# Patient Record
Sex: FEMALE | Race: OTHER | HISPANIC OR LATINO | Employment: FULL TIME | URBAN - METROPOLITAN AREA
[De-identification: names, ages, dates, MRNs, and addresses within clinical notes are randomized per-mention and may not be internally consistent; named-entity substitution may affect disease eponyms.]

---

## 2018-04-20 ENCOUNTER — CONVERSION ENCOUNTER (OUTPATIENT)
Dept: MAMMOGRAPHY | Facility: CLINIC | Age: 48
End: 2018-04-20

## 2019-01-25 ENCOUNTER — OFFICE VISIT (OUTPATIENT)
Dept: FAMILY MEDICINE CLINIC | Facility: CLINIC | Age: 49
End: 2019-01-25
Payer: COMMERCIAL

## 2019-01-25 VITALS
DIASTOLIC BLOOD PRESSURE: 82 MMHG | HEIGHT: 66 IN | BODY MASS INDEX: 32.47 KG/M2 | OXYGEN SATURATION: 97 % | TEMPERATURE: 98 F | SYSTOLIC BLOOD PRESSURE: 128 MMHG | WEIGHT: 202 LBS | HEART RATE: 76 BPM | RESPIRATION RATE: 16 BRPM

## 2019-01-25 DIAGNOSIS — E87.5 HYPERKALEMIA: ICD-10-CM

## 2019-01-25 DIAGNOSIS — R76.8 ANA POSITIVE: ICD-10-CM

## 2019-01-25 DIAGNOSIS — E83.39 ALKALINE PHOSPHATASE DEFICIENCY: Primary | ICD-10-CM

## 2019-01-25 DIAGNOSIS — E55.9 VITAMIN D DEFICIENCY: ICD-10-CM

## 2019-01-25 DIAGNOSIS — E78.2 MIXED HYPERLIPIDEMIA: ICD-10-CM

## 2019-01-25 PROCEDURE — 99214 OFFICE O/P EST MOD 30 MIN: CPT | Performed by: FAMILY MEDICINE

## 2019-01-25 RX ORDER — IBUPROFEN 200 MG
400 TABLET ORAL EVERY 6 HOURS PRN
COMMUNITY

## 2019-01-25 RX ORDER — FLUTICASONE PROPIONATE 50 MCG
1 SPRAY, SUSPENSION (ML) NASAL AS NEEDED
COMMUNITY
End: 2021-05-12 | Stop reason: ALTCHOICE

## 2019-01-25 RX ORDER — SIMETHICONE 20 MG/.3ML
40 EMULSION ORAL 4 TIMES DAILY PRN
COMMUNITY

## 2019-01-25 RX ORDER — LORATADINE 10 MG/1
10 TABLET ORAL DAILY
COMMUNITY

## 2019-01-25 RX ORDER — CHOLECALCIFEROL (VITAMIN D3) 125 MCG
2000 CAPSULE ORAL DAILY
COMMUNITY
End: 2021-05-12 | Stop reason: ALTCHOICE

## 2019-01-25 RX ORDER — TETRAHYDROZOLINE HCL 0.05 %
1 DROPS OPHTHALMIC (EYE) 4 TIMES DAILY PRN
COMMUNITY

## 2019-01-25 RX ORDER — LANOLIN ALCOHOL/MO/W.PET/CERES
3 CREAM (GRAM) TOPICAL
COMMUNITY
End: 2021-05-12 | Stop reason: ALTCHOICE

## 2019-01-25 RX ORDER — FOLIC ACID/MULTIVIT,IRON,MINER 0.4MG-18MG
1250 TABLET ORAL DAILY
COMMUNITY
End: 2021-07-09

## 2019-01-27 NOTE — ASSESSMENT & PLAN NOTE
Chronic ,fair control on current with diet    Advised to maintain a low-fat low-cholesterol diet consult regarding potential comorbidity including cardiovascular disease consult regarding important weight loss

## 2019-01-27 NOTE — PROGRESS NOTES
Subjective:   Chief Complaint   Patient presents with    Follow-up     chronic conditions        Patient ID: Taisha Alonzo is a 50 y o  female  Patient recently she had blood work order by other PCP ,patient brought copy had abnormal Alkaline phosphatase ,abnormal potassium,positive TANGELA   Patient denies any abdomen pain,no nausea no vomiting no diarrhea ,no weight change ,no jaundice ,no muscle pain ,no joint pain or swelling  Patient with history of hyperlipidemia ,controlled by diet ,denies any chest pain no short of breath no palpitation ,no headache        The following portions of the patient's history were reviewed and updated as appropriate: allergies, current medications, past family history, past medical history, past social history, past surgical history and problem list     Review of Systems   Constitutional: Negative for fatigue and fever  HENT: Negative for ear pain, sinus pain, sinus pressure and sore throat  Eyes: Negative for pain and redness  Respiratory: Negative for cough, chest tightness and shortness of breath  Cardiovascular: Negative for chest pain, palpitations and leg swelling  Gastrointestinal: Negative for abdominal pain, blood in stool, constipation, diarrhea and nausea  Genitourinary: Negative for flank pain, frequency and hematuria  Musculoskeletal: Negative for back pain and joint swelling  Skin: Negative for rash  Neurological: Negative for dizziness, numbness and headaches  Hematological: Does not bruise/bleed easily  Objective:  Vitals:    01/25/19 1539   BP: 128/82   BP Location: Left arm   Patient Position: Sitting   Cuff Size: Large   Pulse: 76   Resp: 16   Temp: 98 °F (36 7 °C)   TempSrc: Oral   SpO2: 97%   Weight: 91 6 kg (202 lb)   Height: 5' 6" (1 676 m)      Physical Exam   Constitutional: She is oriented to person, place, and time  She appears well-developed and well-nourished  HENT:   Head: Normocephalic     Right Ear: External ear normal    Left Ear: External ear normal    Eyes: Conjunctivae and EOM are normal  Right eye exhibits no discharge  Left eye exhibits no discharge  Neck: No JVD present  Cardiovascular: Normal rate, regular rhythm and normal heart sounds  Exam reveals no gallop  No murmur heard  Pulmonary/Chest: Effort normal  No respiratory distress  She has no wheezes  She has no rales  She exhibits no tenderness  Abdominal: She exhibits no mass  There is no tenderness  There is no rebound  Musculoskeletal: She exhibits no edema or tenderness  Neurological: She is alert and oriented to person, place, and time  Skin: No rash noted  No erythema  Assessment/Plan:    Vitamin D deficiency  Chronic Asymptomatic ,continue Vit D 2000 iu/day   Vit D rich diet discuss with patient ,recomened increase exercise    Mixed hyperlipidemia  Chronic ,fair control on current with diet  Advised to maintain a low-fat low-cholesterol diet consult regarding potential comorbidity including cardiovascular disease consult regarding important weight loss      Hyperkalemia  Incident finding on recent blood work     TANGELA positive  Incident finding on recent blood work Asymptomatic ,will recheck it if persistent to be abnormal will refer to Rheumatology    Alkaline phosphatase deficiency  Abnormal finding on blood work Asymptomatic   Will order US of liver        Diagnoses and all orders for this visit:    Alkaline phosphatase deficiency  -     Comprehensive metabolic panel; Future  -     US liver; Future    Hyperkalemia  -     Comprehensive metabolic panel; Future    Mixed hyperlipidemia    TANGELA positive  -     TANGELA Screen w/ Reflex to Titer/Pattern; Future    Vitamin D deficiency  -     Vitamin D 25 hydroxy; Future    Other orders  -     Multiple Vitamins-Calcium (ONE-A-DAY WOMENS PO);  Take 1 tablet by mouth daily  -     Cholecalciferol (VITAMIN D3) 2000 units TABS; Take 2,000 Units by mouth daily  -     Probiotic Product (PROBIOTIC PO); Take 1 capsule by mouth daily  -     Coenzyme Q10 (COQ-10) 200 MG CAPS; Take 1 capsule by mouth daily  -     Glucosamine-Chondroit-Vit C-Mn (GLUCOSAMINE 1500 COMPLEX PO); Take 1 capsule by mouth daily  -     loratadine (CLARITIN) 10 mg tablet; Take 10 mg by mouth daily  -     fluticasone (FLONASE) 50 mcg/act nasal spray; 1 spray into each nostril as needed for rhinitis  -     tetrahydrozoline (VISINE) 0 05 % ophthalmic solution; Administer 1 drop to both eyes 4 (four) times a day as needed for irritation  -     Propylene Glycol (SYSTANE BALANCE OP); Apply 1 drop to eye as needed  -     Phenylephrine-DM-GG-APAP (MUCINEX FAST-MAX) 10--650 MG PACK; Take 1 capsule by mouth as needed  -     simethicone (MYLICON) 40 SH/9 1 mL drops; Take 40 mg by mouth 4 (four) times a day as needed for flatulence  -     bismuth subsalicylate (PEPTO BISMOL) 524 mg/30 mL oral suspension; Take 15 mL by mouth every 6 (six) hours as needed for indigestion  -     melatonin 3 mg; Take 3 mg by mouth daily at bedtime  -     ibuprofen (MOTRIN) 200 mg tablet; Take 400 mg by mouth every 6 (six) hours as needed for mild pain  -     Omega-3 Fatty Acids (OMEGA-3 FISH OIL) 1200 MG CAPS;  Take 1,250 mg by mouth daily

## 2019-01-27 NOTE — ASSESSMENT & PLAN NOTE
Chronic Asymptomatic ,continue Vit D 2000 iu/day   Vit D rich diet discuss with patient ,recomened increase exercise

## 2019-01-27 NOTE — ASSESSMENT & PLAN NOTE
Incident finding on recent blood work Asymptomatic ,will recheck it if persistent to be abnormal will refer to Rheumatology

## 2019-02-08 ENCOUNTER — TELEPHONE (OUTPATIENT)
Dept: FAMILY MEDICINE CLINIC | Facility: CLINIC | Age: 49
End: 2019-02-08

## 2019-02-08 DIAGNOSIS — R74.8 ELEVATED ALKALINE PHOSPHATASE LEVEL: Primary | ICD-10-CM

## 2019-02-08 NOTE — TELEPHONE ENCOUNTER
----- Message from Sara Santos MD sent at 2/8/2019 10:39 AM EST -----  Note sent to [facility] ,to refer patient to GI for elevated alkaline phosphatase

## 2019-02-14 ENCOUNTER — ANNUAL EXAM (OUTPATIENT)
Dept: GYNECOLOGY | Facility: CLINIC | Age: 49
End: 2019-02-14
Payer: COMMERCIAL

## 2019-02-14 VITALS
WEIGHT: 204.2 LBS | SYSTOLIC BLOOD PRESSURE: 132 MMHG | RESPIRATION RATE: 16 BRPM | BODY MASS INDEX: 32.82 KG/M2 | HEIGHT: 66 IN | TEMPERATURE: 97.7 F | DIASTOLIC BLOOD PRESSURE: 74 MMHG

## 2019-02-14 DIAGNOSIS — Z12.31 ENCOUNTER FOR SCREENING MAMMOGRAM FOR MALIGNANT NEOPLASM OF BREAST: ICD-10-CM

## 2019-02-14 DIAGNOSIS — Z87.42 HISTORY OF ABNORMAL CERVICAL PAP SMEAR: ICD-10-CM

## 2019-02-14 DIAGNOSIS — Z01.419 WOMEN'S ANNUAL ROUTINE GYNECOLOGICAL EXAMINATION: Primary | ICD-10-CM

## 2019-02-14 DIAGNOSIS — Z11.51 SCREENING FOR HPV (HUMAN PAPILLOMAVIRUS): ICD-10-CM

## 2019-02-14 DIAGNOSIS — N93.9 ABNORMAL UTERINE BLEEDING: ICD-10-CM

## 2019-02-14 PROCEDURE — 99396 PREV VISIT EST AGE 40-64: CPT | Performed by: OBSTETRICS & GYNECOLOGY

## 2019-02-14 PROCEDURE — 87624 HPV HI-RISK TYP POOLED RSLT: CPT | Performed by: OBSTETRICS & GYNECOLOGY

## 2019-02-14 PROCEDURE — G0145 SCR C/V CYTO,THINLAYER,RESCR: HCPCS | Performed by: OBSTETRICS & GYNECOLOGY

## 2019-02-14 RX ORDER — METHYLPREDNISOLONE 4 MG
TABLET, DOSE PACK ORAL
COMMUNITY
Start: 2018-05-04 | End: 2021-05-12 | Stop reason: ALTCHOICE

## 2019-02-14 RX ORDER — LORATADINE 10 MG/1
1 TABLET ORAL EVERY 24 HOURS
COMMUNITY
Start: 2018-04-12 | End: 2021-05-12 | Stop reason: ALTCHOICE

## 2019-02-14 RX ORDER — FLUTICASONE PROPIONATE 50 MCG
SPRAY, SUSPENSION (ML) NASAL
COMMUNITY
Start: 2018-04-12 | End: 2021-05-12 | Stop reason: ALTCHOICE

## 2019-02-14 NOTE — PROGRESS NOTES
Assessment/Plan:       Diagnoses and all orders for this visit:    Women's annual routine gynecological examination  -     Liquid-based pap, screening    Abnormal uterine bleeding  -     US pelvis complete w transvaginal; Future    Encounter for screening mammogram for malignant neoplasm of breast  -     Mammo screening bilateral w 3d & cad; Future    Screening for HPV (human papillomavirus)  -     Liquid-based pap, screening    History of abnormal cervical Pap smear    Other orders  -     Ascorbic Acid Buffered (BUFFERED VITAMIN C) 1000 MG CAPS  -     Glucosamine Sulfate 500 MG TABS; 4 tablets daily- = 2000mg  -     loratadine (CLARITIN) 10 mg tablet; 1 tablet every 24 hours  -     fluticasone (FLONASE) 50 mcg/act nasal spray; inhale 2 spray by intranasal route  every day in each nostril          Subjective:      Patient ID: Taisha Alonzo is a 50 y o  female  The patient is a 54-year-old who presents for an annual gyn exam   She has a history of abnormal Pap smears and a positive HPV test in the past   She had her cervix frozen and had 1 Pap smear afterwards  She was told that it was "improved"  She has had no Pap smears in the last 7 years  The importance of regular Pap smears was stressed  The patient reports that 3 years ago her periods started to be irregular  She had them about every 3 months, then she had a single period the next year  After 11 months of amenorrhea, she had a 1 day period on 10/23/18  She also reports that she has had daily brown spotting for the last 6 months  BW ordered by her PCP showed menopausal levels of FSH and LH in September 2018  She has had infrequent hot flashes since September  She has 1 about once a week  We discussed that this daily spotting is not normal  The patient will need an ultrasound of the pelvis  If her endometrium is thickened, she will need an endometrial biopsy  We discussed that no amount of bleeding is normal after menopause    We also discussed the potential causes of this bleeding including benign causes and uterine cancer  The patient agrees to the ultrasound and endometrial biopsy if necessary  She will return to the office 1 week after her ultrasound  The patient reports that since the birth of her child 11 years ago, she has had a mild problem with stress urinary incontinence  It is not bad enough that she needs to wear a pad  The patient is not sexually active  The patient is under a great deal of stress, because 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  She gets very little sleep  The following portions of the patient's history were reviewed and updated as appropriate: allergies, current medications, past family history, past medical history, past social history, past surgical history and problem list     Review of Systems   Constitutional: Negative  Respiratory: Negative for shortness of breath  Cardiovascular: Negative for chest pain  Gastrointestinal: Negative  Genitourinary: Negative  Skin: Negative  Psychiatric/Behavioral: Negative for agitation, behavioral problems and confusion  Objective:      /74 (BP Location: Right arm, Patient Position: Sitting, Cuff Size: Standard)   Temp 97 7 °F (36 5 °C) (Tympanic)   Resp 16   Ht 5' 6" (1 676 m)   Wt 92 6 kg (204 lb 3 2 oz)   LMP 10/02/2017   BMI 32 96 kg/m²          Physical Exam   Constitutional: She appears well-developed and well-nourished  No distress  HENT:   Head: Normocephalic  Pulmonary/Chest: Effort normal  No respiratory distress  Right breast exhibits no inverted nipple, no mass, no nipple discharge, no skin change and no tenderness  Left breast exhibits no inverted nipple, no mass, no nipple discharge, no skin change and no tenderness  Breasts are symmetrical    Abdominal: She exhibits no distension and no mass  There is no tenderness  There is no rebound and no guarding     Genitourinary: Rectum normal and uterus normal  No labial fusion  There is no rash, tenderness, lesion or injury on the right labia  There is no rash, tenderness, lesion or injury on the left labia  Uterus is not tender  Cervix exhibits no motion tenderness, no discharge and no friability  Right adnexum displays no mass, no tenderness and no fullness  Left adnexum displays no mass, no tenderness and no fullness  No erythema, tenderness or bleeding in the vagina  No foreign body in the vagina  No signs of injury around the vagina  No vaginal discharge found  Lymphadenopathy:        Right axillary: No pectoral and no lateral adenopathy present  Left axillary: No pectoral and no lateral adenopathy present  Skin: Skin is warm, dry and intact  She is not diaphoretic  No cyanosis  Nails show no clubbing  Psychiatric: She has a normal mood and affect   Her speech is normal and behavior is normal

## 2019-02-15 LAB
HPV HR 12 DNA CVX QL NAA+PROBE: NEGATIVE
HPV16 DNA CVX QL NAA+PROBE: NEGATIVE
HPV18 DNA CVX QL NAA+PROBE: NEGATIVE

## 2019-02-18 LAB
LAB AP GYN PRIMARY INTERPRETATION: NORMAL
Lab: NORMAL

## 2019-02-26 ENCOUNTER — TELEPHONE (OUTPATIENT)
Dept: GYNECOLOGY | Facility: CLINIC | Age: 49
End: 2019-02-26

## 2019-02-26 NOTE — TELEPHONE ENCOUNTER
----- Message from Ventura Mcniar sent at 2/25/2019 11:59 AM EST -----      ----- Message -----  From: Zoya Acharya MD  Sent: 2/21/2019  10:20 AM  To: Ventura Mcnair    Notify pt that her pap and HPV are normal

## 2019-03-05 ENCOUNTER — CONSULT (OUTPATIENT)
Dept: GASTROENTEROLOGY | Facility: MEDICAL CENTER | Age: 49
End: 2019-03-05
Payer: COMMERCIAL

## 2019-03-05 VITALS
TEMPERATURE: 97.9 F | WEIGHT: 204 LBS | SYSTOLIC BLOOD PRESSURE: 112 MMHG | DIASTOLIC BLOOD PRESSURE: 74 MMHG | HEIGHT: 66 IN | HEART RATE: 76 BPM | BODY MASS INDEX: 32.78 KG/M2

## 2019-03-05 DIAGNOSIS — Z87.19 HISTORY OF FATTY INFILTRATION OF LIVER: ICD-10-CM

## 2019-03-05 DIAGNOSIS — R74.8 ELEVATED ALKALINE PHOSPHATASE LEVEL: Primary | ICD-10-CM

## 2019-03-05 PROCEDURE — 99244 OFF/OP CNSLTJ NEW/EST MOD 40: CPT | Performed by: INTERNAL MEDICINE

## 2019-03-05 NOTE — LETTER
March 5, 2019     Armando Wright MD  [address]  [address]    Patient: Martin Monte   YOB: 1970   Date of Visit: 3/5/2019       Dear Dr Maryann Coe: Thank you for referring Martin Monte to me for evaluation  Below are my notes for this consultation  If you have questions, please do not hesitate to call me  I look forward to following your patient along with you  Sincerely,      JIMMIE Flowers  [facility]  Mobile: [phone]  Available on [email or web address]  [email or web address]           CC: No Recipients  Jerri Mcgarry MD  3/5/2019  9:44 AM  Sign at close encounter  [facility] - Outpatient Consultation  Martin Monte 50 y o  female MRN: [id number]  Encounter: [id number]      PCP: Armando Wright MD  Referring: Armando Wright MD  [address]   [address]  [place] [address]      ASSESSMENT AND PLAN:      1  Elevated alkaline phosphatase level  2  History of fatty infiltration of liver  Incidentally discovered elevated alkaline phosphatase, with TANGELA positivity  Right upper quadrant ultrasound demonstrates mild fatty infiltration, which is nonspecific  She has no evidence of cirrhosis on labs, or imaging  Differential includes primary biliary cirrhosis versus autoimmune hepatitis, versus fatty liver disease vs other  Will obtain labs as below to evaluate for chronic liver disease, rule out differential as above  May require liver biopsy for definitive diagnosis  I did encourage weight loss as transient TANGELA positivity can be seen in fatty liver disease, as well  - Ambulatory referral to Gastroenterology  - Antimitochondrial antibody; Future  - Anti-smooth muscle antibody, IgG; Future  - IgG, IgA, IgM; Future  - Tissue transglutaminase, IgA; Future  - Chronic Hepatitis Panel; Future  - TANGELA Screen w/ Reflex to Titer/Pattern;  Future- TANGELA Screen w/ Reflex to Titer/Pattern; Future      ______________________________________________________________________    HPI:      Patient is a 51-year-old female referred to me for incidentally elevated alkaline phosphatase  She has a past medical history of hyperlipidemia, vitamin-D deficiency, BMI > 30  She relates incidentally discovered elevated alkaline phosphatase to 148 on routine labs  Labs have been elevated since April of 2018, and remain persistently elevated in the range of 130-148 () on repeat CMPs performed in September, January, February  Follow-up lab testing did also developed demonstrated elevated GGT at 86 (ULN 60)  The remainder of her liver enzymes remain within normal limits  Labs checked in May of 2018 also demonstrated a positive TANGELA, repeat TANGELA testing in January was negative  Her alpha-fetoprotein was 3 5, within normal limits  Her right upper quadrant ultrasound performed in February demonstrated mild fatty infiltration, without biliary ductal dilatation  She does relate a history of vague bloating/gas pains, periumbilical abdominal pain that occurred during four discrete episodes between December through January  Episodes were refractory to use of simethicone, but were relieved with lying down which resulted in increased reflux symptoms  This has spontaneously resolved, not reoccurred since January however  She has never had a colonoscopy previously  She denies family history of liver disease, does not drink alcohol or smoke  She has no family history of colon cancer  REVIEW OF SYSTEMS:    CONSTITUTIONAL: Denies any fever, chills, rigors, and weight loss  HEENT: No earache or tinnitus  Denies hearing loss or visual disturbances  CARDIOVASCULAR: No chest pain or palpitations  RESPIRATORY: Denies any cough, hemoptysis, shortness of breath or dyspnea on exertion  GASTROINTESTINAL: As noted in the History of Present Illness  GENITOURINARY: No problems with urination   Denies any hematuria or dysuria  NEUROLOGIC: No dizziness or vertigo, denies headaches  MUSCULOSKELETAL: Denies any muscle or joint pain  SKIN: Denies skin rashes or itching  ENDOCRINE: Denies excessive thirst  Denies intolerance to heat or cold  PSYCHOSOCIAL: Denies depression or anxiety  Denies any recent memory loss         Historical Information   Past Medical History:   Diagnosis Date    Allergic      Past Surgical History:   Procedure Laterality Date    APPENDECTOMY      CERVICAL POLYPECTOMY      WISDOM TOOTH EXTRACTION       Social History   Social History     Substance and Sexual Activity   Alcohol Use No     Social History     Substance and Sexual Activity   Drug Use No     Social History     Tobacco Use   Smoking Status Former Smoker    Types: Cigarettes   Smokeless Tobacco Never Used     Family History   Problem Relation Age of Onset    Hypertension Family     Heart disease Family     Diabetes type II Family     Hypertension Mother     Heart disease Mother     Heart attack Father     Kidney disease Sister     Hypertension Sister        Meds/Allergies       Current Outpatient Medications:     Ascorbic Acid Buffered (BUFFERED VITAMIN C) 1000 MG CAPS    bismuth subsalicylate (PEPTO BISMOL) 524 mg/30 mL oral suspension    Cholecalciferol (VITAMIN D3) 2000 units TABS    Coenzyme Q10 (COQ-10) 200 MG CAPS    fluticasone (FLONASE) 50 mcg/act nasal spray    fluticasone (FLONASE) 50 mcg/act nasal spray    Glucosamine Sulfate 500 MG TABS    Glucosamine-Chondroit-Vit C-Mn (GLUCOSAMINE 1500 COMPLEX PO)    ibuprofen (MOTRIN) 200 mg tablet    loratadine (CLARITIN) 10 mg tablet    loratadine (CLARITIN) 10 mg tablet    melatonin 3 mg    Multiple Vitamins-Calcium (ONE-A-DAY WOMENS PO)    Omega-3 Fatty Acids (OMEGA-3 FISH OIL) 1200 MG CAPS    Phenylephrine-DM-GG-APAP (MUCINEX FAST-MAX) 10--650 MG PACK    Probiotic Product (PROBIOTIC PO)    Propylene Glycol (SYSTANE BALANCE OP)    simethicone (MYLICON) 40 mg/0 6 mL drops    tetrahydrozoline (VISINE) 0 05 % ophthalmic solution    Allergies   Allergen Reactions    Nicotine Cough, Headache, Nasal Congestion and Sneezing    No Known Allergies            Objective     Blood pressure 112/74, pulse 76, temperature 97 9 °F (36 6 °C), height 5' 6" (1 676 m), weight 92 5 kg (204 lb), last menstrual period 10/02/2017  Body mass index is 32 93 kg/m²  PHYSICAL EXAM:      General Appearance:   Alert, cooperative, no distress   HEENT:   Normocephalic, atraumatic, anicteric      Neck:  Supple, symmetrical, trachea midline   Lungs:   Clear to auscultation bilaterally; no rales, rhonchi or wheezing; respirations unlabored    Heart[de-identified]   Regular rate and rhythm; no murmur, rub, or gallop  Abdomen:   Soft, non-tender, non-distended; normal bowel sounds; no masses, no organomegaly    Genitalia:   Deferred    Rectal:   Deferred    Extremities:  No cyanosis, clubbing or edema    Pulses:  2+ and symmetric    Skin:  No jaundice, rashes, or lesions    Lymph nodes:  No palpable cervical lymphadenopathy        Lab Results:     No results found for: WBC, HGB, HCT, MCV, PLT    No results found for: NA, K, CL, CO2, ANIONGAP, BUN, CREATININE, GLUCOSE, GLUF, CALCIUM, CORRECTEDCA, AST, ALT, ALKPHOS, PROT, BILITOT, EGFR    No results found for: INR, PROTIME      Radiology Results:   No results found

## 2019-03-05 NOTE — PROGRESS NOTES
[facility] - Outpatient Consultation  Emmanuel Johnson 50 y o  female MRN: [id number]  Encounter: [id number]      PCP: Dallin Weeks MD  Referring: Dallin Weeks MD  [address] [address]      ASSESSMENT AND PLAN:      1  Elevated alkaline phosphatase level  2  History of fatty infiltration of liver  Incidentally discovered elevated alkaline phosphatase, with TANGELA positivity  Right upper quadrant ultrasound demonstrates mild fatty infiltration, which is nonspecific  She has no evidence of cirrhosis on labs, or imaging  Differential includes primary biliary cirrhosis versus autoimmune hepatitis, versus fatty liver disease vs other  Will obtain labs as below to evaluate for chronic liver disease, rule out differential as above  May require liver biopsy for definitive diagnosis  I did encourage weight loss as transient TANGELA positivity can be seen in fatty liver disease, as well  - Ambulatory referral to Gastroenterology  - Antimitochondrial antibody; Future  - Anti-smooth muscle antibody, IgG; Future  - IgG, IgA, IgM; Future  - Tissue transglutaminase, IgA; Future  - Chronic Hepatitis Panel; Future  - TANGELA Screen w/ Reflex to Titer/Pattern; Future- TANGELA Screen w/ Reflex to Titer/Pattern; Future      ______________________________________________________________________    HPI:      Patient is a 55-year-old female referred to me for incidentally elevated alkaline phosphatase  She has a past medical history of hyperlipidemia, vitamin-D deficiency, BMI > 30  She relates incidentally discovered elevated alkaline phosphatase to 148 on routine labs  Labs have been elevated since April of 2018, and remain persistently elevated in the range of 130-148 () on repeat CMPs performed in September, January, February  Follow-up lab testing did also developed demonstrated elevated GGT at 86 (ULN 60)  The remainder of her liver enzymes remain within normal limits  Labs checked in May of 2018 also demonstrated a positive TANGELA, repeat TANGELA testing in January was negative  Her alpha-fetoprotein was 3 5, within normal limits  Her right upper quadrant ultrasound performed in February demonstrated mild fatty infiltration, without biliary ductal dilatation  She does relate a history of vague bloating/gas pains, periumbilical abdominal pain that occurred during four discrete episodes between December through January  Episodes were refractory to use of simethicone, but were relieved with lying down which resulted in increased reflux symptoms  This has spontaneously resolved, not reoccurred since January however  She has never had a colonoscopy previously  She denies family history of liver disease, does not drink alcohol or smoke  She denies use of new OTC (does take omega 3 OTC), does use as needed NSAIDs  She has no family history of colon cancer  REVIEW OF SYSTEMS:    CONSTITUTIONAL: Denies any fever, chills, rigors, and weight loss  HEENT: No earache or tinnitus  Denies hearing loss or visual disturbances  CARDIOVASCULAR: No chest pain or palpitations  RESPIRATORY: Denies any cough, hemoptysis, shortness of breath or dyspnea on exertion  GASTROINTESTINAL: As noted in the History of Present Illness  GENITOURINARY: No problems with urination  Denies any hematuria or dysuria  NEUROLOGIC: No dizziness or vertigo, denies headaches  MUSCULOSKELETAL: Denies any muscle or joint pain  SKIN: Denies skin rashes or itching  ENDOCRINE: Denies excessive thirst  Denies intolerance to heat or cold  PSYCHOSOCIAL: Denies depression or anxiety  Denies any recent memory loss         Historical Information   Past Medical History:   Diagnosis Date    Allergic      Past Surgical History:   Procedure Laterality Date    APPENDECTOMY      CERVICAL POLYPECTOMY      WISDOM TOOTH EXTRACTION       Social History   Social History     Substance and Sexual Activity   Alcohol Use No Social History     Substance and Sexual Activity   Drug Use No     Social History     Tobacco Use   Smoking Status Former Smoker    Types: Cigarettes   Smokeless Tobacco Never Used     Family History   Problem Relation Age of Onset    Hypertension Family     Heart disease Family     Diabetes type II Family     Hypertension Mother     Heart disease Mother     Heart attack Father     Kidney disease Sister     Hypertension Sister        Meds/Allergies       Current Outpatient Medications:     Ascorbic Acid Buffered (BUFFERED VITAMIN C) 1000 MG CAPS    bismuth subsalicylate (PEPTO BISMOL) 524 mg/30 mL oral suspension    Cholecalciferol (VITAMIN D3) 2000 units TABS    Coenzyme Q10 (COQ-10) 200 MG CAPS    fluticasone (FLONASE) 50 mcg/act nasal spray    fluticasone (FLONASE) 50 mcg/act nasal spray    Glucosamine Sulfate 500 MG TABS    Glucosamine-Chondroit-Vit C-Mn (GLUCOSAMINE 1500 COMPLEX PO)    ibuprofen (MOTRIN) 200 mg tablet    loratadine (CLARITIN) 10 mg tablet    loratadine (CLARITIN) 10 mg tablet    melatonin 3 mg    Multiple Vitamins-Calcium (ONE-A-DAY WOMENS PO)    Omega-3 Fatty Acids (OMEGA-3 FISH OIL) 1200 MG CAPS    Phenylephrine-DM-GG-APAP (MUCINEX FAST-MAX) 10--650 MG PACK    Probiotic Product (PROBIOTIC PO)    Propylene Glycol (SYSTANE BALANCE OP)    simethicone (MYLICON) 40 HU/4 5 mL drops    tetrahydrozoline (VISINE) 0 05 % ophthalmic solution    Allergies   Allergen Reactions    Nicotine Cough, Headache, Nasal Congestion and Sneezing    No Known Allergies            Objective     Blood pressure 112/74, pulse 76, temperature 97 9 °F (36 6 °C), height 5' 6" (1 676 m), weight 92 5 kg (204 lb), last menstrual period 10/02/2017  Body mass index is 32 93 kg/m²       PHYSICAL EXAM:      General Appearance:   Alert, cooperative, no distress   HEENT:   Normocephalic, atraumatic, anicteric      Neck:  Supple, symmetrical, trachea midline   Lungs:   Clear to auscultation bilaterally; no rales, rhonchi or wheezing; respirations unlabored    Heart[de-identified]   Regular rate and rhythm; no murmur, rub, or gallop  Abdomen:   Soft, non-tender, non-distended; normal bowel sounds; no masses, no organomegaly    Genitalia:   Deferred    Rectal:   Deferred    Extremities:  No cyanosis, clubbing or edema    Pulses:  2+ and symmetric    Skin:  No jaundice, rashes, or lesions    Lymph nodes:  No palpable cervical lymphadenopathy        Lab Results:     No results found for: WBC, HGB, HCT, MCV, PLT    No results found for: NA, K, CL, CO2, ANIONGAP, BUN, CREATININE, GLUCOSE, GLUF, CALCIUM, CORRECTEDCA, AST, ALT, ALKPHOS, PROT, BILITOT, EGFR    No results found for: INR, PROTIME      Radiology Results:   No results found

## 2019-03-12 DIAGNOSIS — N93.9 ABNORMAL UTERINE BLEEDING: ICD-10-CM

## 2019-03-13 ENCOUNTER — ANNUAL EXAM (OUTPATIENT)
Dept: GYNECOLOGY | Facility: CLINIC | Age: 49
End: 2019-03-13
Payer: COMMERCIAL

## 2019-03-13 VITALS
SYSTOLIC BLOOD PRESSURE: 120 MMHG | HEART RATE: 76 BPM | BODY MASS INDEX: 32.78 KG/M2 | DIASTOLIC BLOOD PRESSURE: 80 MMHG | HEIGHT: 66 IN | RESPIRATION RATE: 17 BRPM | WEIGHT: 204 LBS

## 2019-03-13 DIAGNOSIS — N93.9 ABNORMAL UTERINE BLEEDING: Primary | ICD-10-CM

## 2019-03-13 PROCEDURE — 88305 TISSUE EXAM BY PATHOLOGIST: CPT | Performed by: PATHOLOGY

## 2019-03-13 PROCEDURE — 58100 BIOPSY OF UTERUS LINING: CPT | Performed by: OBSTETRICS & GYNECOLOGY

## 2019-03-13 NOTE — PROGRESS NOTES
Assessment/Plan:       Diagnoses and all orders for this visit:    Abnormal uterine bleeding  Comments:  EMB results pending    Other orders  -     Endometrial biopsy          Subjective:      Patient ID: Masha Mckeon is a 50 y o  female  The patient is a 72-year-old who reports that she had been amenorrheic for about 11 months, then started having daily spotting for the past 5 months  She had an ultrasound of the pelvis, which showed a 7 mm endometrium  She is in the office today for a follow-up visit  The results were discussed with the patient  Because of the possibility of endometrial hyperplasia, she agrees to an endometrial biopsy today  See procedure note  The following portions of the patient's history were reviewed and updated as appropriate: allergies, current medications, past family history, past medical history, past social history, past surgical history and problem list     Review of Systems   Constitutional: Negative  Respiratory: Negative for shortness of breath  Cardiovascular: Negative for chest pain  Gastrointestinal: Negative  Genitourinary: Negative  Skin: Negative  Psychiatric/Behavioral: Negative for agitation, behavioral problems and confusion  Objective:      /80 (BP Location: Right arm, Patient Position: Sitting, Cuff Size: Standard)   Pulse 76   Resp 17   Ht 5' 6" (1 676 m)   Wt 92 5 kg (204 lb)   LMP 10/02/2017   BMI 32 93 kg/m²          Physical Exam   Constitutional: She appears well-developed and well-nourished  No distress  HENT:   Head: Normocephalic  Pulmonary/Chest: Effort normal  No respiratory distress  Genitourinary: No labial fusion  There is no rash, tenderness, lesion or injury on the right labia  There is no rash, tenderness, lesion or injury on the left labia  Uterus is not deviated, not enlarged, not fixed and not tender  Cervix exhibits no motion tenderness, no discharge and no friability   Right adnexum displays no mass, no tenderness and no fullness  Left adnexum displays no mass, no tenderness and no fullness  No erythema, tenderness or bleeding in the vagina  No foreign body in the vagina  No signs of injury around the vagina  No vaginal discharge found  Skin: Skin is warm, dry and intact  She is not diaphoretic  No cyanosis  Nails show no clubbing  Psychiatric: She has a normal mood and affect  Her speech is normal and behavior is normal      Endometrial biopsy  Date/Time: 3/13/2019 9:42 AM  Performed by: Diana Chopra MD  Authorized by: Diana Chopra MD     Consent:     Consent obtained:  Verbal    Consent given by:  Patient    Procedural risks discussed:  Bleeding    Patient questions answered: yes      Patient agrees, verbalizes understanding, and wants to proceed: yes      Educational handouts given: no      Instructions and paperwork completed: yes    Indication:     Indications:  Other disorder of menstruation and other abnormal bleeding from female genital tract    Pre-procedure:     Pre-procedure timeout performed: yes    Procedure:     Procedure: endometrial biopsy with Pipelle      A bivalve speculum was placed in the vagina: yes      Cervix cleaned and prepped: yes      A paracervical block was performed: no      An intracervical block was performed: no      The cervix was dilated: no      Uterus sounded: yes      Uterus sound depth (cm):  7    Specimen collected: specimen collected and sent to pathology      Patient tolerated procedure well with no complications: yes    Findings:     Uterus size:  Non-gravid    Cervix: normal      Adnexa: normal

## 2019-04-23 ENCOUNTER — HOSPITAL ENCOUNTER (OUTPATIENT)
Dept: MAMMOGRAPHY | Facility: MEDICAL CENTER | Age: 49
Discharge: HOME | End: 2019-04-23
Payer: COMMERCIAL

## 2019-04-23 VITALS — WEIGHT: 204 LBS | HEIGHT: 66 IN | BODY MASS INDEX: 32.78 KG/M2

## 2019-04-23 DIAGNOSIS — Z12.31 ENCOUNTER FOR SCREENING MAMMOGRAM FOR MALIGNANT NEOPLASM OF BREAST: ICD-10-CM

## 2019-04-23 PROCEDURE — 77063 BREAST TOMOSYNTHESIS BI: CPT

## 2019-04-23 PROCEDURE — 77067 SCR MAMMO BI INCL CAD: CPT

## 2020-01-30 ENCOUNTER — TELEPHONE (OUTPATIENT)
Dept: FAMILY MEDICINE CLINIC | Facility: CLINIC | Age: 50
End: 2020-01-30

## 2020-03-06 LAB
ACTIN IGG SERPL-ACNC: 4 UNITS (ref 0–19)
ANA HOMOGEN TITR SER: ABNORMAL {TITER}
ANA SPECKLED TITR SER: ABNORMAL {TITER}
ANA TITR SER IF: POSITIVE {TITER}
CENTROMERE B AB SER-ACNC: <0.2 AI (ref 0–0.9)
CHROMATIN AB SERPL-ACNC: <0.2 AI (ref 0–0.9)
DSDNA AB SER-ACNC: <1 IU/ML (ref 0–9)
ENA JO1 AB SER-ACNC: <0.2 AI (ref 0–0.9)
ENA RNP AB SER-ACNC: <0.2 AI (ref 0–0.9)
ENA SCL70 AB SER-ACNC: <0.2 AI (ref 0–0.9)
ENA SM AB SER-ACNC: <0.2 AI (ref 0–0.9)
ENA SS-A AB SER-ACNC: <0.2 AI (ref 0–0.9)
ENA SS-B AB SER-ACNC: <0.2 AI (ref 0–0.9)
HAV IGM SERPL QL IA: NEGATIVE
HBV CORE IGM SERPL QL IA: NEGATIVE
HBV SURFACE AG SERPL QL IA: NEGATIVE
HCV AB S/CO SERPL IA: <0.1 S/CO RATIO (ref 0–0.9)
IGA SERPL-MCNC: 361 MG/DL (ref 87–352)
IGG SERPL-MCNC: 1284 MG/DL (ref 700–1600)
IGM SERPL-MCNC: 55 MG/DL (ref 26–217)
LABORATORY COMMENT REPORT: ABNORMAL
MITOCHONDRIA M2 IGG SER-ACNC: <20 UNITS (ref 0–20)
SL AMB NOTE:: ABNORMAL
SL AMB SEE BELOW:: ABNORMAL
TTG IGA SER-ACNC: <2 U/ML (ref 0–3)

## 2020-07-17 ENCOUNTER — TELEPHONE (OUTPATIENT)
Dept: FAMILY MEDICINE CLINIC | Facility: CLINIC | Age: 50
End: 2020-07-17

## 2021-04-12 ENCOUNTER — APPOINTMENT (OUTPATIENT)
Dept: URGENT CARE | Facility: CLINIC | Age: 51
End: 2021-04-12

## 2021-04-12 ENCOUNTER — OCCMED (OUTPATIENT)
Dept: URGENT CARE | Facility: CLINIC | Age: 51
End: 2021-04-12

## 2021-04-12 DIAGNOSIS — Z02.1 ENCOUNTER FOR PRE-EMPLOYMENT HEALTH SCREENING EXAMINATION: Primary | ICD-10-CM

## 2021-04-12 DIAGNOSIS — Z02.1 ENCOUNTER FOR PRE-EMPLOYMENT HEALTH SCREENING EXAMINATION: ICD-10-CM

## 2021-04-12 PROCEDURE — 86787 VARICELLA-ZOSTER ANTIBODY: CPT

## 2021-04-12 PROCEDURE — 86762 RUBELLA ANTIBODY: CPT

## 2021-04-12 PROCEDURE — 86735 MUMPS ANTIBODY: CPT

## 2021-04-12 PROCEDURE — 86765 RUBEOLA ANTIBODY: CPT

## 2021-04-12 PROCEDURE — 86480 TB TEST CELL IMMUN MEASURE: CPT

## 2021-04-13 LAB
MEV IGG SER QL: NORMAL
MUV IGG SER QL: NORMAL
RUBV IGG SERPL IA-ACNC: 153.9 IU/ML
VZV IGG SER IA-ACNC: NORMAL

## 2021-04-15 LAB
GAMMA INTERFERON BACKGROUND BLD IA-ACNC: 0.04 IU/ML
M TB IFN-G BLD-IMP: NEGATIVE
M TB IFN-G CD4+ BCKGRND COR BLD-ACNC: -0.01 IU/ML
M TB IFN-G CD4+ BCKGRND COR BLD-ACNC: 0 IU/ML
MITOGEN IGNF BCKGRD COR BLD-ACNC: >10 IU/ML

## 2021-05-08 ENCOUNTER — TRANSCRIBE ORDERS (OUTPATIENT)
Dept: URGENT CARE | Age: 51
End: 2021-05-08

## 2021-05-08 ENCOUNTER — APPOINTMENT (OUTPATIENT)
Dept: LAB | Age: 51
End: 2021-05-08

## 2021-05-08 DIAGNOSIS — Z00.8 HEALTH EXAMINATION IN POPULATION SURVEY: ICD-10-CM

## 2021-05-08 DIAGNOSIS — Z00.8 HEALTH EXAMINATION IN POPULATION SURVEY: Primary | ICD-10-CM

## 2021-05-08 LAB
CHOLEST SERPL-MCNC: 217 MG/DL (ref 50–200)
EST. AVERAGE GLUCOSE BLD GHB EST-MCNC: 108 MG/DL
HBA1C MFR BLD: 5.4 %
HDLC SERPL-MCNC: 64 MG/DL
LDLC SERPL CALC-MCNC: 125 MG/DL (ref 0–100)
NONHDLC SERPL-MCNC: 153 MG/DL
TRIGL SERPL-MCNC: 138 MG/DL

## 2021-05-08 PROCEDURE — 80061 LIPID PANEL: CPT

## 2021-05-08 PROCEDURE — 83036 HEMOGLOBIN GLYCOSYLATED A1C: CPT

## 2021-05-08 PROCEDURE — 36415 COLL VENOUS BLD VENIPUNCTURE: CPT

## 2021-05-12 ENCOUNTER — OFFICE VISIT (OUTPATIENT)
Dept: FAMILY MEDICINE CLINIC | Facility: CLINIC | Age: 51
End: 2021-05-12
Payer: COMMERCIAL

## 2021-05-12 VITALS
HEIGHT: 66 IN | HEART RATE: 86 BPM | BODY MASS INDEX: 32.92 KG/M2 | OXYGEN SATURATION: 97 % | SYSTOLIC BLOOD PRESSURE: 130 MMHG | TEMPERATURE: 97.5 F | DIASTOLIC BLOOD PRESSURE: 78 MMHG | WEIGHT: 204.8 LBS

## 2021-05-12 DIAGNOSIS — Z12.31 SCREENING MAMMOGRAM, ENCOUNTER FOR: ICD-10-CM

## 2021-05-12 DIAGNOSIS — Z12.11 SCREENING FOR COLORECTAL CANCER: ICD-10-CM

## 2021-05-12 DIAGNOSIS — E55.9 VITAMIN D DEFICIENCY: ICD-10-CM

## 2021-05-12 DIAGNOSIS — E66.9 CLASS 1 OBESITY WITHOUT SERIOUS COMORBIDITY WITH BODY MASS INDEX (BMI) OF 30.0 TO 30.9 IN ADULT, UNSPECIFIED OBESITY TYPE: ICD-10-CM

## 2021-05-12 DIAGNOSIS — Z12.12 SCREENING FOR COLORECTAL CANCER: ICD-10-CM

## 2021-05-12 DIAGNOSIS — E78.00 PURE HYPERCHOLESTEROLEMIA: Primary | ICD-10-CM

## 2021-05-12 DIAGNOSIS — R79.89 LFTS ABNORMAL: ICD-10-CM

## 2021-05-12 DIAGNOSIS — Z11.4 SCREENING FOR HIV (HUMAN IMMUNODEFICIENCY VIRUS): ICD-10-CM

## 2021-05-12 DIAGNOSIS — E87.5 HYPERKALEMIA: ICD-10-CM

## 2021-05-12 PROCEDURE — 99214 OFFICE O/P EST MOD 30 MIN: CPT | Performed by: FAMILY MEDICINE

## 2021-05-12 RX ORDER — MELATONIN
2000 DAILY
COMMUNITY

## 2021-05-12 NOTE — PROGRESS NOTES
Assessment/Plan:       No problem-specific Assessment & Plan notes found for this encounter  Diagnoses and all orders for this visit:    Pure hypercholesterolemia  Comments: To follow with low-fat diet  Orders:  -     Comprehensive metabolic panel; Future  -     CBC and differential; Future  -     Vitamin D 25 hydroxy; Future  -     TSH, 3rd generation with Free T4 reflex; Future  -     UA w Reflex to Microscopic w Reflex to Culture  -     Urine Microscopic  -     Urine culture    Hyperkalemia  Comments:  corrected    Vitamin D deficiency  -     Vitamin D 25 hydroxy; Future    LFTs abnormal  Comments:  Patient was evaluated by GI  Orders:  -     Comprehensive metabolic panel; Future  -     CBC and differential; Future    Class 1 obesity without serious comorbidity with body mass index (BMI) of 30 0 to 30 9 in adult, unspecified obesity type  Comments:  Advised to lose weight    Screening for HIV (human immunodeficiency virus)  Comments:  Verbal consent obtained  Consel patient  Orders:  -     HIV 1/2 Antigen/Antibody (4th Generation) w Reflex [facility]; Future    Screening mammogram, encounter for  -     Mammo screening bilateral w 3d & cad; Future    Screening for colorectal cancer  -     Ambulatory referral to Gastroenterology; Future    Other orders  -     cholecalciferol (VITAMIN D3) 1,000 units tablet; Take 1,000 Units by mouth daily        Patient Instructions   To follow with test results       Orders Placed This Encounter   Procedures    Urine culture    Mammo screening bilateral w 3d & cad     Standing Status:   Future     Standing Expiration Date:   5/10/2025     Scheduling Instructions:      Do not wear any perfume, powder, lotion or deodorant on the breast or underarm area  If you have had any prior breast studies done at a different facility than [facility], please bring a copy of the study with you on the day of your test  Please allow at least 30 minutes for this appointment   Please bring your insurance cards, a form of photo ID and a list of your medications with you  To schedule this appointment, please contact Central Scheduling at [phone]  Order Specific Question:   Is the patient pregnant? Answer:   No     Order Specific Question:   Approval for Diagnostic Call Back/Ultrasound If Necessary     Answer:   Yes     Order Specific Question:   Approval for Coordination for Additional Testing     Answer: Yes    HIV 1/2 Antigen/Antibody (4th Generation) w Reflex [facility]     Standing Status:   Future     Number of Occurrences:   1     Standing Expiration Date:   5/10/2022     Order Specific Question:   Release to patient through [employer]     Answer:   Immediate    Comprehensive metabolic panel     This is a patient instruction: Patient fasting for 8 hours or longer recommended  Standing Status:   Future     Number of Occurrences:   1     Standing Expiration Date:   5/12/2022    CBC and differential     This is a patient instruction: This test is non-fasting  Please drink two glasses of water morning of bloodwork  Standing Status:   Future     Number of Occurrences:   1     Standing Expiration Date:   5/12/2022    Vitamin D 25 hydroxy     Standing Status:   Future     Number of Occurrences:   1     Standing Expiration Date:   5/12/2022    TSH, 3rd generation with Free T4 reflex     Standing Status:   Future     Number of Occurrences:   1     Standing Expiration Date:   5/12/2022    UA w Reflex to Microscopic w Reflex to Culture    Urine Microscopic    Ambulatory referral to Gastroenterology     Standing Status:   Future     Standing Expiration Date:   5/10/2022     Referral Priority:   Routine     Referral Type:   Consult - AMB     Referral Reason:   Specialty Services Required     Requested Specialty:   Gastroenterology     Number of Visits Requested:   1     Expiration Date:   5/10/2022         Subjective:     Patient ID: Victoriano Smith is a 48 y o  female      HPI  Patient is here to establish  She feels well  Hyperlipidemia  Admit to regular fat intake  Denied chest pain  Abnormal liver function test   Denied abdominal pain  Patient does not drink alcohol  She was evaluated by GI  Vitamin-D deficiency  Denied fatigue or myalgia  Obesity  Patient stated her weight had been stable for long time  Admit to regular diet    Test results   lab done on May 12, 2021  Reviewed results with patient    Review of Systems   Constitutional: Negative for activity change, appetite change, chills, fatigue, fever and unexpected weight change  HENT: Negative for congestion, ear discharge, ear pain, hearing loss, nosebleeds, rhinorrhea, sinus pressure, sore throat, tinnitus, trouble swallowing and voice change  Eyes: Negative for photophobia, pain and visual disturbance  Respiratory: Negative for cough, chest tightness, shortness of breath and wheezing  Cardiovascular: Negative for chest pain, palpitations and leg swelling  Gastrointestinal: Negative for abdominal pain, anal bleeding, blood in stool, constipation, diarrhea, nausea and vomiting  Endocrine: Negative for cold intolerance, heat intolerance, polydipsia and polyuria  Genitourinary: Negative for dysuria, frequency, hematuria and urgency  Musculoskeletal: Negative for arthralgias, back pain, gait problem, joint swelling, myalgias and neck pain  Skin: Negative for rash  Neurological: Negative for dizziness, tremors, seizures, syncope, weakness, light-headedness and headaches  Hematological: Negative for adenopathy  Does not bruise/bleed easily  Psychiatric/Behavioral: Negative for agitation, behavioral problems, confusion, dysphoric mood, hallucinations and sleep disturbance  The patient is not nervous/anxious  Objective:     Physical Exam  Constitutional:       General: She is not in acute distress  Appearance: Normal appearance  She is well-developed   She is not ill-appearing or diaphoretic  HENT:      Head: Normocephalic and atraumatic  Eyes:      General: No scleral icterus  Right eye: No discharge  Left eye: No discharge  Conjunctiva/sclera: Conjunctivae normal       Pupils: Pupils are equal, round, and reactive to light  Neck:      Thyroid: No thyromegaly  Vascular: No JVD  Cardiovascular:      Rate and Rhythm: Normal rate and regular rhythm  Pulses: Normal pulses  Heart sounds: Normal heart sounds  No murmur  Comments: Extremities  No edema  Pulmonary:      Effort: Pulmonary effort is normal       Breath sounds: Normal breath sounds  Abdominal:      Palpations: Abdomen is soft  There is no mass  Tenderness: There is no abdominal tenderness  There is no guarding or rebound  Hernia: No hernia is present  Musculoskeletal:      Right lower leg: No edema  Left lower leg: No edema  Lymphadenopathy:      Cervical: No cervical adenopathy  Skin:     Findings: No rash  Neurological:      Mental Status: She is alert and oriented to person, place, and time  Cranial Nerves: No cranial nerve deficit  Motor: No abnormal muscle tone  Psychiatric:         Mood and Affect: Mood normal          Behavior: Behavior normal          Thought Content: Thought content normal          Judgment: Judgment normal         BMI Counseling: Body mass index is 33 06 kg/m²  The BMI is above normal  Nutrition recommendations include decreasing portion sizes

## 2021-05-13 ENCOUNTER — APPOINTMENT (OUTPATIENT)
Dept: LAB | Age: 51
End: 2021-05-13
Payer: COMMERCIAL

## 2021-05-13 DIAGNOSIS — E55.9 VITAMIN D DEFICIENCY: ICD-10-CM

## 2021-05-13 DIAGNOSIS — Z11.4 SCREENING FOR HIV (HUMAN IMMUNODEFICIENCY VIRUS): ICD-10-CM

## 2021-05-13 DIAGNOSIS — R79.89 LFTS ABNORMAL: ICD-10-CM

## 2021-05-13 DIAGNOSIS — E78.00 PURE HYPERCHOLESTEROLEMIA: ICD-10-CM

## 2021-05-13 PROBLEM — E66.811 CLASS 1 OBESITY WITHOUT SERIOUS COMORBIDITY WITH BODY MASS INDEX (BMI) OF 30.0 TO 30.9 IN ADULT: Status: ACTIVE | Noted: 2021-05-13

## 2021-05-13 PROBLEM — E66.9 CLASS 1 OBESITY WITHOUT SERIOUS COMORBIDITY WITH BODY MASS INDEX (BMI) OF 30.0 TO 30.9 IN ADULT: Status: ACTIVE | Noted: 2021-05-13

## 2021-05-13 LAB
25(OH)D3 SERPL-MCNC: 24.8 NG/ML (ref 30–100)
ALBUMIN SERPL BCP-MCNC: 4 G/DL (ref 3.5–5)
ALP SERPL-CCNC: 137 U/L (ref 46–116)
ALT SERPL W P-5'-P-CCNC: 61 U/L (ref 12–78)
ANION GAP SERPL CALCULATED.3IONS-SCNC: 6 MMOL/L (ref 4–13)
AST SERPL W P-5'-P-CCNC: 40 U/L (ref 5–45)
BACTERIA UR QL AUTO: ABNORMAL /HPF
BASOPHILS # BLD AUTO: 0.04 THOUSANDS/ΜL (ref 0–0.1)
BASOPHILS NFR BLD AUTO: 1 % (ref 0–1)
BILIRUB SERPL-MCNC: 0.34 MG/DL (ref 0.2–1)
BILIRUB UR QL STRIP: NEGATIVE
BUN SERPL-MCNC: 14 MG/DL (ref 5–25)
CALCIUM SERPL-MCNC: 9.2 MG/DL (ref 8.3–10.1)
CHLORIDE SERPL-SCNC: 107 MMOL/L (ref 100–108)
CLARITY UR: CLEAR
CO2 SERPL-SCNC: 29 MMOL/L (ref 21–32)
COLOR UR: YELLOW
CREAT SERPL-MCNC: 0.88 MG/DL (ref 0.6–1.3)
EOSINOPHIL # BLD AUTO: 0.07 THOUSAND/ΜL (ref 0–0.61)
EOSINOPHIL NFR BLD AUTO: 1 % (ref 0–6)
ERYTHROCYTE [DISTWIDTH] IN BLOOD BY AUTOMATED COUNT: 12.5 % (ref 11.6–15.1)
GFR SERPL CREATININE-BSD FRML MDRD: 77 ML/MIN/1.73SQ M
GLUCOSE P FAST SERPL-MCNC: 91 MG/DL (ref 65–99)
GLUCOSE UR STRIP-MCNC: NEGATIVE MG/DL
HCT VFR BLD AUTO: 40.3 % (ref 34.8–46.1)
HGB BLD-MCNC: 12.6 G/DL (ref 11.5–15.4)
HGB UR QL STRIP.AUTO: NEGATIVE
HYALINE CASTS #/AREA URNS LPF: ABNORMAL /LPF
IMM GRANULOCYTES # BLD AUTO: 0.02 THOUSAND/UL (ref 0–0.2)
IMM GRANULOCYTES NFR BLD AUTO: 0 % (ref 0–2)
KETONES UR STRIP-MCNC: NEGATIVE MG/DL
LEUKOCYTE ESTERASE UR QL STRIP: ABNORMAL
LYMPHOCYTES # BLD AUTO: 0.96 THOUSANDS/ΜL (ref 0.6–4.47)
LYMPHOCYTES NFR BLD AUTO: 17 % (ref 14–44)
MCH RBC QN AUTO: 31 PG (ref 26.8–34.3)
MCHC RBC AUTO-ENTMCNC: 31.3 G/DL (ref 31.4–37.4)
MCV RBC AUTO: 99 FL (ref 82–98)
MONOCYTES # BLD AUTO: 0.37 THOUSAND/ΜL (ref 0.17–1.22)
MONOCYTES NFR BLD AUTO: 7 % (ref 4–12)
NEUTROPHILS # BLD AUTO: 4.1 THOUSANDS/ΜL (ref 1.85–7.62)
NEUTS SEG NFR BLD AUTO: 74 % (ref 43–75)
NITRITE UR QL STRIP: NEGATIVE
NON-SQ EPI CELLS URNS QL MICRO: ABNORMAL /HPF
NRBC BLD AUTO-RTO: 0 /100 WBCS
PH UR STRIP.AUTO: 5.5 [PH]
PLATELET # BLD AUTO: 235 THOUSANDS/UL (ref 149–390)
PMV BLD AUTO: 11.3 FL (ref 8.9–12.7)
POTASSIUM SERPL-SCNC: 4.2 MMOL/L (ref 3.5–5.3)
PROT SERPL-MCNC: 7.5 G/DL (ref 6.4–8.2)
PROT UR STRIP-MCNC: NEGATIVE MG/DL
RBC # BLD AUTO: 4.06 MILLION/UL (ref 3.81–5.12)
RBC #/AREA URNS AUTO: ABNORMAL /HPF
SODIUM SERPL-SCNC: 142 MMOL/L (ref 136–145)
SP GR UR STRIP.AUTO: 1.02 (ref 1–1.03)
TSH SERPL DL<=0.05 MIU/L-ACNC: 0.88 UIU/ML (ref 0.36–3.74)
UROBILINOGEN UR QL STRIP.AUTO: 0.2 E.U./DL
WBC # BLD AUTO: 5.56 THOUSAND/UL (ref 4.31–10.16)
WBC #/AREA URNS AUTO: ABNORMAL /HPF

## 2021-05-13 PROCEDURE — 85025 COMPLETE CBC W/AUTO DIFF WBC: CPT

## 2021-05-13 PROCEDURE — 87147 CULTURE TYPE IMMUNOLOGIC: CPT | Performed by: FAMILY MEDICINE

## 2021-05-13 PROCEDURE — 80053 COMPREHEN METABOLIC PANEL: CPT

## 2021-05-13 PROCEDURE — 82306 VITAMIN D 25 HYDROXY: CPT

## 2021-05-13 PROCEDURE — 87389 HIV-1 AG W/HIV-1&-2 AB AG IA: CPT

## 2021-05-13 PROCEDURE — 87086 URINE CULTURE/COLONY COUNT: CPT | Performed by: FAMILY MEDICINE

## 2021-05-13 PROCEDURE — 84443 ASSAY THYROID STIM HORMONE: CPT

## 2021-05-13 PROCEDURE — 81001 URINALYSIS AUTO W/SCOPE: CPT | Performed by: FAMILY MEDICINE

## 2021-05-13 PROCEDURE — 36415 COLL VENOUS BLD VENIPUNCTURE: CPT

## 2021-05-14 LAB
BACTERIA UR CULT: ABNORMAL
HIV 1+2 AB+HIV1 P24 AG SERPL QL IA: NORMAL

## 2021-05-17 ENCOUNTER — TELEPHONE (OUTPATIENT)
Dept: FAMILY MEDICINE CLINIC | Facility: CLINIC | Age: 51
End: 2021-05-17

## 2021-05-17 DIAGNOSIS — N39.0 URINARY TRACT INFECTION WITHOUT HEMATURIA, SITE UNSPECIFIED: Primary | ICD-10-CM

## 2021-05-17 RX ORDER — AMOXICILLIN 500 MG/1
500 CAPSULE ORAL EVERY 8 HOURS SCHEDULED
Qty: 21 CAPSULE | Refills: 0 | Status: SHIPPED | OUTPATIENT
Start: 2021-05-17 | End: 2021-05-24

## 2021-05-17 NOTE — TELEPHONE ENCOUNTER
Labs   TSH is normal  HIV is negative  CMP remarkable for elevated alkaline phosphatase  Check alkaline phosphatase isoenzyme  CBC remarkable for increased MCV  Check W15, folic acid and TSH  Low vitamin-D  Increase vitamin-D to 2000 unit daily, recheck vitamin-D level in 2 months  UA is positive for white blood cells  Urine culture is positive  Start amoxicillin 500 mg 3 times a day for 1 week    Recheck UA and urine culture in 2-3 weeks

## 2021-05-19 ENCOUNTER — TELEPHONE (OUTPATIENT)
Dept: FAMILY MEDICINE CLINIC | Facility: CLINIC | Age: 51
End: 2021-05-19

## 2021-05-19 DIAGNOSIS — N39.0 URINARY TRACT INFECTION WITHOUT HEMATURIA, SITE UNSPECIFIED: Primary | ICD-10-CM

## 2021-06-07 ENCOUNTER — APPOINTMENT (OUTPATIENT)
Dept: LAB | Age: 51
End: 2021-06-07
Payer: COMMERCIAL

## 2021-06-07 DIAGNOSIS — N39.0 URINARY TRACT INFECTION WITHOUT HEMATURIA, SITE UNSPECIFIED: ICD-10-CM

## 2021-06-07 LAB
BACTERIA UR QL AUTO: ABNORMAL /HPF
BILIRUB UR QL STRIP: NEGATIVE
CLARITY UR: CLEAR
COLOR UR: YELLOW
GLUCOSE UR STRIP-MCNC: NEGATIVE MG/DL
HGB UR QL STRIP.AUTO: NEGATIVE
HYALINE CASTS #/AREA URNS LPF: ABNORMAL /LPF
KETONES UR STRIP-MCNC: NEGATIVE MG/DL
LEUKOCYTE ESTERASE UR QL STRIP: ABNORMAL
NITRITE UR QL STRIP: NEGATIVE
NON-SQ EPI CELLS URNS QL MICRO: ABNORMAL /HPF
PH UR STRIP.AUTO: 6 [PH]
PROT UR STRIP-MCNC: NEGATIVE MG/DL
RBC #/AREA URNS AUTO: ABNORMAL /HPF
SP GR UR STRIP.AUTO: 1.02 (ref 1–1.03)
UROBILINOGEN UR QL STRIP.AUTO: 1 E.U./DL
WBC #/AREA URNS AUTO: ABNORMAL /HPF

## 2021-06-07 PROCEDURE — 87086 URINE CULTURE/COLONY COUNT: CPT

## 2021-06-07 PROCEDURE — 81001 URINALYSIS AUTO W/SCOPE: CPT | Performed by: FAMILY MEDICINE

## 2021-06-07 PROCEDURE — 87077 CULTURE AEROBIC IDENTIFY: CPT

## 2021-06-07 PROCEDURE — 87186 SC STD MICRODIL/AGAR DIL: CPT

## 2021-06-08 ENCOUNTER — TELEPHONE (OUTPATIENT)
Dept: FAMILY MEDICINE CLINIC | Facility: CLINIC | Age: 51
End: 2021-06-08

## 2021-06-08 NOTE — TELEPHONE ENCOUNTER
Urinalysis is positive for leukocyte no further white blood cell  Urine culture is positive but ask patient if she has any urinary symptoms    If no urinary symptoms will not treat  Advised to follow with gyn for gyn exam

## 2021-06-08 NOTE — TELEPHONE ENCOUNTER
Patient is aware of her results  She is having some symptoms and would like an antibiotic called in

## 2021-06-09 ENCOUNTER — TELEPHONE (OUTPATIENT)
Dept: FAMILY MEDICINE CLINIC | Facility: CLINIC | Age: 51
End: 2021-06-09

## 2021-06-09 DIAGNOSIS — N39.0 URINARY TRACT INFECTION WITHOUT HEMATURIA, SITE UNSPECIFIED: Primary | ICD-10-CM

## 2021-06-09 LAB — BACTERIA UR CULT: ABNORMAL

## 2021-06-09 RX ORDER — NITROFURANTOIN 25; 75 MG/1; MG/1
100 CAPSULE ORAL 2 TIMES DAILY
Qty: 10 CAPSULE | Refills: 0 | Status: SHIPPED | OUTPATIENT
Start: 2021-06-09 | End: 2021-06-14

## 2021-06-09 NOTE — TELEPHONE ENCOUNTER
Patient called states that she saw her urine culture and she is going to need antibiotic called in to the [place] pharmacy   Please review and advise

## 2021-06-09 NOTE — TELEPHONE ENCOUNTER
Called pt ,discuss with ,I placed prescription for UTI , macrobid  x 5 day , recheck ua and uc in 2 weeks

## 2021-06-30 ENCOUNTER — APPOINTMENT (OUTPATIENT)
Dept: LAB | Age: 51
End: 2021-06-30
Payer: COMMERCIAL

## 2021-06-30 DIAGNOSIS — N39.0 URINARY TRACT INFECTION WITHOUT HEMATURIA, SITE UNSPECIFIED: ICD-10-CM

## 2021-06-30 LAB
BILIRUB UR QL STRIP: NEGATIVE
CLARITY UR: CLEAR
COLOR UR: YELLOW
GLUCOSE UR STRIP-MCNC: NEGATIVE MG/DL
HGB UR QL STRIP.AUTO: NEGATIVE
KETONES UR STRIP-MCNC: NEGATIVE MG/DL
LEUKOCYTE ESTERASE UR QL STRIP: NEGATIVE
NITRITE UR QL STRIP: NEGATIVE
PH UR STRIP.AUTO: 6 [PH]
PROT UR STRIP-MCNC: NEGATIVE MG/DL
SP GR UR STRIP.AUTO: 1.01 (ref 1–1.03)
UROBILINOGEN UR QL STRIP.AUTO: 0.2 E.U./DL

## 2021-06-30 PROCEDURE — 87086 URINE CULTURE/COLONY COUNT: CPT

## 2021-06-30 PROCEDURE — 87147 CULTURE TYPE IMMUNOLOGIC: CPT

## 2021-06-30 PROCEDURE — 81003 URINALYSIS AUTO W/O SCOPE: CPT

## 2021-07-01 LAB — BACTERIA UR CULT: ABNORMAL

## 2021-07-06 ENCOUNTER — TELEPHONE (OUTPATIENT)
Dept: FAMILY MEDICINE CLINIC | Facility: CLINIC | Age: 51
End: 2021-07-06

## 2021-07-06 NOTE — TELEPHONE ENCOUNTER
----- Message from Sheila Bailey MD sent at 7/6/2021 12:49 PM EDT -----  UA is normal   Urine culture no significant growth

## 2021-07-08 NOTE — PROGRESS NOTES
Assessment        Diagnoses and all orders for this visit:    Encounter for gynecological examination (general) (routine) without abnormal findings    Cervical cancer screening  -     Liquid-based pap, screening    Encounter for screening mammogram for breast cancer    Menopause  -     US pelvis complete w transvaginal; Future    Vaginal discharge  -     US pelvis complete w transvaginal; Future                  Plan      All questions answered  Diagnosis explained in detail, including differential   Follow up in 1 year  Follow up as needed  Mammogram   Pap smear  Reviewed ASCCP recommendations for cervical cytology with patient  It is recommended to start screening at age of 24  Women aged 21-29 should be screened with cytology alone every 3 years  Women aged 33-67 should be screened with cytology with HPV co-testing every 5 years or cytology alone every 3 years  Women older than 72 do not need screening if they had adequate negative screening in the past (3 consecutive negative cytology or 2 negative co-tests) 10 years  Women who underwent total hysterectomy for benign indications and do no have a history of SHIRAZ 2 or higher do not need cervical cytology screening  Due h/o HPV, pt requests repeat PAP, if arpita, will follow- routine guidelines    Discussed behavioral modifications for hot flashes/night sweats  Discussed medical therapy with HRT or SSRI     Recommended Pelvic US due to possible postmenopausal bleeding as vaginal discharge not usually brown, if thickened endometrium, will need endometrial biopsy  If bleeding/abnormal discharge persist, should have endometrial biopsy regardless of lining  Patient agrees to EMB, return in 2 weeks for EMB  US ordered also to evaluate endometrium r/o polyp due to h o polyp    Subjective      Elio Munoz is a 46 y o  female who presents for annual exam       Chief Complaint   Patient presents with   [address] [facility] Patient Visit    Gynecologic Exam     Yearly   Last pap 19 D-, mammogram scheduled for 21, due for CRC sreening  Patient would like discuss post-menopausal symptoms - sweating     She reports whitish discharge that sometimes turn brown, she had US that showed EM thickness 7 mm  Endometrial biopsy    C/o sweating several months ago until now, mainly at night, she works at night    Postmenopausal Bleeding no  Vaginal Dryness no  Hot flashes sweating  Sexually active no  H/o D and C polypectomy  Previous GYN Dr Viola Lomas    TSH 21      Last Pap: 19 neg HPV  Last mammogram: 19, scheduled 21  Colorectal cancer screening: appt with GI 21      Current contraception: post menopausal status  History of abnormal Pap smear: yes - HPV  History of abnormal mammogram: no  Family history of uterine or ovarian cancer: no  Family history of breast cancer: no  Family history of colon cancer: no        Menstrual History:  OB History        3    Para   3    Term   3       0    AB        Living   3       SAB   0    TAB   0    Ectopic   0    Multiple   0    Live Births   3                Menarche age: 15  Patient's last menstrual period was 10/02/2017     Menopause: 2018          Past Medical History:   Diagnosis Date    Allergic      Past Surgical History:   Procedure Laterality Date    APPENDECTOMY      CERVICAL POLYPECTOMY      WISDOM TOOTH EXTRACTION       Family History   Problem Relation Age of Onset    Hypertension Family     Heart disease Family     Diabetes type II Family     Hypertension Mother     Heart disease Mother     Heart attack Father     Kidney disease Sister     Hypertension Sister        Social History     Tobacco Use    Smoking status: Former Smoker     Types: Cigarettes    Smokeless tobacco: Never Used   Vaping Use    Vaping Use: Never used   Substance Use Topics    Alcohol use: No    Drug use: No          Current Outpatient Medications:     cholecalciferol (VITAMIN D3) 1,000 units tablet, Take 1,000 Units by mouth daily, Disp: , Rfl:     Coenzyme Q10 (COQ-10) 200 MG CAPS, Take 1 capsule by mouth daily, Disp: , Rfl:     ibuprofen (MOTRIN) 200 mg tablet, Take 400 mg by mouth every 6 (six) hours as needed for mild pain, Disp: , Rfl:     loratadine (CLARITIN) 10 mg tablet, Take 10 mg by mouth daily, Disp: , Rfl:     Probiotic Product (PROBIOTIC PO), Take 1 capsule by mouth daily, Disp: , Rfl:     Propylene Glycol (SYSTANE BALANCE OP), Apply 1 drop to eye as needed, Disp: , Rfl:     simethicone (MYLICON) 40 ZK/4 4 mL drops, Take 40 mg by mouth 4 (four) times a day as needed for flatulence, Disp: , Rfl:     tetrahydrozoline (VISINE) 0 05 % ophthalmic solution, Administer 1 drop to both eyes 4 (four) times a day as needed for irritation, Disp: , Rfl:     Allergies   Allergen Reactions    Nicotine Cough, Headache, Sneezing and Nasal Congestion     smoke           Review of Systems   Constitutional: Negative  HENT: Negative  Eyes: Negative  Respiratory: Negative  Cardiovascular: Negative  Gastrointestinal: Negative  Endocrine: Negative  Genitourinary:        As noted in HPI   Musculoskeletal: Negative  Skin: Negative  Allergic/Immunologic: Negative  Neurological: Negative  Hematological: Negative  Psychiatric/Behavioral: Negative  /78 (BP Location: Right arm, Patient Position: Sitting, Cuff Size: Adult)   Pulse 100   Temp 97 7 °F (36 5 °C) (Temporal)   Ht 5' 6" (1 676 m)   Wt 95 3 kg (210 lb)   LMP 10/02/2017   BMI 33 89 kg/m²         Physical Exam  Constitutional:       Appearance: She is well-developed  Genitourinary:      Pelvic exam was performed with patient in the lithotomy position  Vulva, urethra, bladder, vagina, uterus and rectum normal       No vulval condylomata, lesion, tenderness, Bartholin's cyst or rash noted  No signs of labial injury  No posterior fourchette tenderness, injury, rash or lesion present        No inguinal adenopathy present in the right or left side  No lesions in the vagina  No vaginal discharge, tenderness, bleeding, atrophy or prolapse  No cervical motion tenderness, friability, lesion or polyp  Uterus is not enlarged or tender  No right or left adnexal mass present  Right adnexa not tender or full  Left adnexa not tender or full  Genitourinary Comments:      Rectum:      No external hemorrhoid  HENT:      Head: Normocephalic  Nose: Nose normal    Eyes:      Conjunctiva/sclera: Conjunctivae normal    Neck:      Thyroid: No thyromegaly  Cardiovascular:      Rate and Rhythm: Normal rate and regular rhythm  Heart sounds: Normal heart sounds  No murmur heard  Pulmonary:      Effort: Pulmonary effort is normal  No respiratory distress  Breath sounds: Normal breath sounds  No wheezing or rales  Chest:      Breasts:         Right: No mass, nipple discharge, skin change or tenderness  Left: No mass, nipple discharge, skin change or tenderness  Abdominal:      General: There is no distension  Palpations: Abdomen is soft  There is no mass  Tenderness: There is no abdominal tenderness  There is no guarding or rebound  Musculoskeletal:         General: No tenderness  Cervical back: Neck supple  No muscular tenderness  Lymphadenopathy:      Cervical: No cervical adenopathy  Lower Body: No right inguinal adenopathy  No left inguinal adenopathy  Neurological:      Mental Status: She is alert and oriented to person, place, and time  Skin:     General: Skin is warm and dry     Psychiatric:         Mood and Affect: Mood normal          Behavior: Behavior normal

## 2021-07-09 ENCOUNTER — OFFICE VISIT (OUTPATIENT)
Dept: OBGYN CLINIC | Facility: CLINIC | Age: 51
End: 2021-07-09
Payer: COMMERCIAL

## 2021-07-09 VITALS
SYSTOLIC BLOOD PRESSURE: 114 MMHG | HEIGHT: 66 IN | HEART RATE: 100 BPM | TEMPERATURE: 97.7 F | WEIGHT: 210 LBS | BODY MASS INDEX: 33.75 KG/M2 | DIASTOLIC BLOOD PRESSURE: 78 MMHG

## 2021-07-09 DIAGNOSIS — Z78.0 MENOPAUSE: ICD-10-CM

## 2021-07-09 DIAGNOSIS — Z01.419 ENCOUNTER FOR GYNECOLOGICAL EXAMINATION (GENERAL) (ROUTINE) WITHOUT ABNORMAL FINDINGS: Primary | ICD-10-CM

## 2021-07-09 DIAGNOSIS — Z12.31 ENCOUNTER FOR SCREENING MAMMOGRAM FOR BREAST CANCER: ICD-10-CM

## 2021-07-09 DIAGNOSIS — Z12.4 CERVICAL CANCER SCREENING: ICD-10-CM

## 2021-07-09 DIAGNOSIS — N89.8 VAGINAL DISCHARGE: ICD-10-CM

## 2021-07-09 PROCEDURE — 99396 PREV VISIT EST AGE 40-64: CPT | Performed by: OBSTETRICS & GYNECOLOGY

## 2021-07-09 PROCEDURE — G0145 SCR C/V CYTO,THINLAYER,RESCR: HCPCS | Performed by: OBSTETRICS & GYNECOLOGY

## 2021-07-09 PROCEDURE — G0476 HPV COMBO ASSAY CA SCREEN: HCPCS | Performed by: OBSTETRICS & GYNECOLOGY

## 2021-07-09 NOTE — PATIENT INSTRUCTIONS
Wellness Visit for Adults   AMBULATORY CARE:   A wellness visit  is when you see your healthcare provider to get screened for health problems  Your healthcare provider will also give you advice on how to stay healthy  Write down your questions so you remember to ask them  Ask your healthcare provider how often you should have a wellness visit  What happens at a wellness visit:  Your healthcare provider will ask about your health, and your family history of health problems  This includes high blood pressure, heart disease, and cancer  He or she will ask if you have symptoms that concern you, if you smoke, and about your mood  You may also be asked about your intake of medicines, supplements, food, and alcohol  Any of the following may be done:  · Your weight  will be checked  Your height may also be checked so your body mass index (BMI) can be calculated  Your BMI shows if you are at a healthy weight  · Your blood pressure  and heart rate will be checked  Your temperature may also be checked  · Blood and urine tests  may be done  Blood tests may be done to check your cholesterol levels  Abnormal cholesterol levels increase your risk for heart disease and stroke  You may also need a blood or urine test to check for diabetes if you are at increased risk  Urine tests may be done to look for signs of an infection or kidney disease  · A physical exam  includes checking your heartbeat and lungs with a stethoscope  Your healthcare provider may also check your skin to look for sun damage  · Screening tests  may be recommended  A screening test is done to check for diseases that may not cause symptoms  The screening tests you may need depend on your age, gender, family history, and lifestyle habits  For example, colorectal screening may be recommended if you are 48years old or older  Screening tests you need if you are a woman:   · A Pap smear  is used to screen for cervical cancer   Pap smears are usually done every 3 to 5 years depending on your age  You may need them more often if you have had abnormal Pap smear test results in the past  Ask your healthcare provider how often you should have a Pap smear  · A mammogram  is an x-ray of your breasts to screen for breast cancer  Experts recommend mammograms every 2 years starting at age 48 years  You may need a mammogram at age 52 years or younger if you have an increased risk for breast cancer  Talk to your healthcare provider about when you should start having mammograms and how often you need them  Vaccines you may need:   · Get an influenza vaccine  every year  The influenza vaccine protects you from the flu  Several types of viruses cause the flu  The viruses change over time, so new vaccines are made each year  · Get a tetanus-diphtheria (Td) booster vaccine  every 10 years  This vaccine protects you against tetanus and diphtheria  Tetanus is a severe infection that may cause painful muscle spasms and lockjaw  Diphtheria is a severe bacterial infection that causes a thick covering in the back of your mouth and throat  · Get a human papillomavirus (HPV) vaccine  if you are female and aged 23 to 32 or male 23 to 24 and never received it  This vaccine protects you from HPV infection  HPV is the most common infection spread by sexual contact  HPV may also cause vaginal, penile, and anal cancers  · Get a pneumococcal vaccine  if you are aged 72 years or older  The pneumococcal vaccine is an injection given to protect you from pneumococcal disease  Pneumococcal disease is an infection caused by pneumococcal bacteria  The infection may cause pneumonia, meningitis, or an ear infection  · Get a shingles vaccine  if you are 60 or older, even if you have had shingles before  The shingles vaccine is an injection to protect you from the varicella-zoster virus  This is the same virus that causes chickenpox   Shingles is a painful rash that develops in people who had chickenpox or have been exposed to the virus  How to eat healthy:  My Plate is a model for planning healthy meals  It shows the types and amounts of foods that should go on your plate  Fruits and vegetables make up about half of your plate, and grains and protein make up the other half  A serving of dairy is included on the side of your plate  The amount of calories and serving sizes you need depends on your age, gender, weight, and height  Examples of healthy foods are listed below:  · Eat a variety of vegetables  such as dark green, red, and orange vegetables  You can also include canned vegetables low in sodium (salt) and frozen vegetables without added butter or sauces  · Eat a variety of fresh fruits , canned fruit in 100% juice, frozen fruit, and dried fruit  · Include whole grains  At least half of the grains you eat should be whole grains  Examples include whole-wheat bread, wheat pasta, brown rice, and whole-grain cereals such as oatmeal     · Eat a variety of protein foods such as seafood (fish and shellfish), lean meat, and poultry without skin (turkey and chicken)  Examples of lean meats include pork leg, shoulder, or tenderloin, and beef round, sirloin, tenderloin, and extra lean ground beef  Other protein foods include eggs and egg substitutes, beans, peas, soy products, nuts, and seeds  · Choose low-fat dairy products such as skim or 1% milk or low-fat yogurt, cheese, and cottage cheese  · Limit unhealthy fats  such as butter, hard margarine, and shortening  Exercise:  Exercise at least 30 minutes per day on most days of the week  Some examples of exercise include walking, biking, dancing, and swimming  You can also fit in more physical activity by taking the stairs instead of the elevator or parking farther away from stores  Include muscle strengthening activities 2 days each week  Regular exercise provides many health benefits   It helps you manage your weight, and decreases your risk for type 2 diabetes, heart disease, stroke, and high blood pressure  Exercise can also help improve your mood  Ask your healthcare provider about the best exercise plan for you  General health and safety guidelines:   · Do not smoke  Nicotine and other chemicals in cigarettes and cigars can cause lung damage  Ask your healthcare provider for information if you currently smoke and need help to quit  E-cigarettes or smokeless tobacco still contain nicotine  Talk to your healthcare provider before you use these products  · Limit alcohol  A drink of alcohol is 12 ounces of beer, 5 ounces of wine, or 1½ ounces of liquor  · Lose weight, if needed  Being overweight increases your risk of certain health conditions  These include heart disease, high blood pressure, type 2 diabetes, and certain types of cancer  · Protect your skin  Do not sunbathe or use tanning beds  Use sunscreen with a SPF 15 or higher  Apply sunscreen at least 15 minutes before you go outside  Reapply sunscreen every 2 hours  Wear protective clothing, hats, and sunglasses when you are outside  · Drive safely  Always wear your seatbelt  Make sure everyone in your car wears a seatbelt  A seatbelt can save your life if you are in an accident  Do not use your cell phone when you are driving  This could distract you and cause an accident  Pull over if you need to make a call or send a text message  · Practice safe sex  Use latex condoms if are sexually active and have more than one partner  Your healthcare provider may recommend screening tests for sexually transmitted infections (STIs)  · Wear helmets, lifejackets, and protective gear  Always wear a helmet when you ride a bike or motorcycle, go skiing, or play sports that could cause a head injury  Wear protective equipment when you play sports  Wear a lifejacket when you are on a boat or doing water sports      © Copyright [employer] 2020 Information is for End User's use only and may not be sold, redistributed or otherwise used for commercial purposes  All illustrations and images included in CareNotes® are the copyrighted property of [employer]  or [employer]  The above information is an  only  It is not intended as medical advice for individual conditions or treatments  Talk to your doctor, nurse or pharmacist before following any medical regimen to see if it is safe and effective for you  Menopause   WHAT YOU NEED TO KNOW:   Menopause is a normal stage in a woman's life when her monthly periods stop  Menopause starts when the ovaries slowly stop making the female hormones estrogen and progesterone  A woman who has not had a period for a full year after the age of 39 is considered to be in menopause  Perimenopause is a stage before menopause that may cause signs and symptoms similar to menopause  Perimenopause can last an average of 4 to 5 years  DISCHARGE INSTRUCTIONS:   Follow up with your healthcare provider as directed:  Write down your questions so you remember to ask them during your visits  Self-care:   · Manage hot flashes  Hot flashes are brief periods of feeling very warm, flushed, and sweaty  Hot flashes can last from a few seconds to several minutes  They may happen many times during the day, and are common at night  Layer your clothing so that you can easily remove some clothing and cool yourself during a hot flash  Cold drinks may also be helpful  · Reduce vaginal dryness  by using over-the-counter vaginal creams  Vaginal dryness may cause you to have pain or discomfort during sex  Only use creams that are made for vaginal use  Do  not  use petroleum jelly  You may need an estrogen cream to put in and around your vagina  Estrogen cream may help decrease vaginal dryness and lower your risk of vaginal infections  · Continue to use birth control  during perimenopause if you do not want to get pregnant   You may need to use birth control until it has been 1 year since your periods stopped  Ask your healthcare provider when you can stop using birth control to prevent pregnancy  Lead a healthy lifestyle:  After menopause, your risk for heart disease and bone loss increases  Ask about these and other ways to stay healthy:  · Exercise regularly  Exercise helps you maintain a healthy weight  Exercise can also help to control your blood pressure and cholesterol levels  Include weight-bearing exercise for strong bones  Ask your healthcare provider about the best exercise plan for you  · Eat a variety of healthy foods  Include fruits, vegetables, whole grains (whole-wheat bread, pasta, and cereals), low-fat dairy, and lean protein foods (beans, poultry, and fish)  Limit foods high in sodium (salt)  Ask your healthcare provider for more information about a meal plan that is right for you  · Maintain a healthy weight  Check with your healthcare provider before you start any weight loss program      · Take supplements as directed  You may need extra calcium and vitamin D to help prevent osteoporosis  · Limit alcohol and caffeine  Alcohol and caffeine may worsen your symptoms  · Do not smoke  If you smoke, it is never too late to quit  You are more likely to have a heart attack, lung disease, blood clots, and cancer if you smoke  Ask your healthcare provider for information if you need help quitting  Contact your healthcare provider if:   · You have vaginal bleeding after menopause  · You have questions or concerns about your condition or care  © 2017 [employer] Information is for End User's use only and may not be sold, redistributed or otherwise used for commercial purposes  All illustrations and images included in CareNotes® are the copyrighted property of [employer]  or Vital[employer]  The above information is an  only   It is not intended as medical advice for individual conditions or treatments  Talk to your doctor, nurse or pharmacist before following any medical regimen to see if it is safe and effective for you

## 2021-07-12 ENCOUNTER — HOSPITAL ENCOUNTER (OUTPATIENT)
Dept: ULTRASOUND IMAGING | Facility: HOSPITAL | Age: 51
Discharge: HOME | End: 2021-07-12
Attending: OBSTETRICS & GYNECOLOGY
Payer: COMMERCIAL

## 2021-07-12 DIAGNOSIS — Z78.0 MENOPAUSE: ICD-10-CM

## 2021-07-12 DIAGNOSIS — N89.8 VAGINAL DISCHARGE: ICD-10-CM

## 2021-07-12 PROCEDURE — 76856 US EXAM PELVIC COMPLETE: CPT

## 2021-07-12 PROCEDURE — 76830 TRANSVAGINAL US NON-OB: CPT

## 2021-07-16 LAB
LAB AP GYN PRIMARY INTERPRETATION: NORMAL
Lab: NORMAL

## 2021-07-21 ENCOUNTER — PROCEDURE VISIT (OUTPATIENT)
Dept: OBGYN CLINIC | Facility: CLINIC | Age: 51
End: 2021-07-21
Payer: COMMERCIAL

## 2021-07-21 VITALS
BODY MASS INDEX: 34.23 KG/M2 | HEART RATE: 102 BPM | DIASTOLIC BLOOD PRESSURE: 84 MMHG | WEIGHT: 213 LBS | TEMPERATURE: 97.3 F | HEIGHT: 66 IN | SYSTOLIC BLOOD PRESSURE: 134 MMHG

## 2021-07-21 DIAGNOSIS — Z78.0 MENOPAUSE: ICD-10-CM

## 2021-07-21 DIAGNOSIS — Z01.812 PRE-PROCEDURE LAB EXAM: ICD-10-CM

## 2021-07-21 DIAGNOSIS — N39.3 STRESS INCONTINENCE: ICD-10-CM

## 2021-07-21 DIAGNOSIS — N95.0 PMB (POSTMENOPAUSAL BLEEDING): Primary | ICD-10-CM

## 2021-07-21 PROCEDURE — 58100 BIOPSY OF UTERUS LINING: CPT | Performed by: OBSTETRICS & GYNECOLOGY

## 2021-07-21 PROCEDURE — 88305 TISSUE EXAM BY PATHOLOGIST: CPT | Performed by: PATHOLOGY

## 2021-07-21 PROCEDURE — 99214 OFFICE O/P EST MOD 30 MIN: CPT | Performed by: OBSTETRICS & GYNECOLOGY

## 2021-07-21 NOTE — PROGRESS NOTES
Endometrial biopsy    Date/Time: 7/21/2021 7:53 AM  Performed by: Adeola Akien MD  Authorized by: Adeola Aiken MD   Universal Protocol:  Procedure performed by:  Consent: Verbal consent obtained  Written consent obtained    Risks and benefits: risks, benefits and alternatives were discussed  Patient understanding: patient states understanding of the procedure being performed  Patient consent: the patient's understanding of the procedure matches consent given  Procedure consent: procedure consent matches procedure scheduled  Relevant documents: relevant documents present and verified  Test results: test results available and properly labeled  Patient identity confirmed: verbally with patient      Indication:     Indications: Post-menopausal bleeding      Chronicity of post-menopausal bleeding:  Recurrent    Progression of post-menopausal bleeding:  Waxing and waning  Procedure:     Procedure: endometrial biopsy with Pipelle      A bivalve speculum was placed in the vagina: yes      Cervix cleaned and prepped: yes      The cervix was dilated: no      Uterus sounded: yes      Uterus sound depth (cm):  7    Curettes used:  1    Specimen collected: specimen collected and sent to pathology      Patient tolerated procedure well with no complications: yes

## 2021-07-21 NOTE — PATIENT INSTRUCTIONS
Endometrial Biopsy   WHAT YOU NEED TO KNOW:   Endometrial biopsy is a procedure to remove a tissue sample from the lining of your uterus  This procedure is done through your vagina  DISCHARGE INSTRUCTIONS:   Call your doctor or surgeon if:   · You have severe pain that does not go away after you take pain medicine  · You have a fever  · You have pain or cramping that lasts longer than a few days  · You have white or yellow vaginal discharge  · You have more vaginal bleeding than you were told to expect  · You have questions or concerns about your condition or care  Medicines:   · NSAIDs , such as ibuprofen, help decrease swelling, pain, and fever  NSAIDs can cause stomach bleeding or kidney problems in certain people  If you take blood thinner medicine, always ask your healthcare provider if NSAIDs are safe for you  Always read the medicine label and follow directions  · Take your medicine as directed  Contact your healthcare provider if you think your medicine is not helping or if you have side effects  Tell him or her if you are allergic to any medicine  Keep a list of the medicines, vitamins, and herbs you take  Include the amounts, and when and why you take them  Bring the list or the pill bottles to follow-up visits  Carry your medicine list with you in case of an emergency  Do not have sex, douche, or use a tampon  for at least 10 to 14 days  Follow up with your doctor or surgeon as directed:  Write down your questions so you remember to ask them during your visits  © Copyright [employer] 2021 Information is for End User's use only and may not be sold, redistributed or otherwise used for commercial purposes  All illustrations and images included in CareNotes® are the copyrighted property of A D A M , Inc  or Randa Ma  The above information is an  only  It is not intended as medical advice for individual conditions or treatments   Talk to your doctor, nurse or pharmacist before following any medical regimen to see if it is safe and effective for you  Kegel Exercises for Women   AMBULATORY CARE:   Kegel exercises  help strengthen your pelvic muscles  Pelvic muscles hold your pelvic organs, such as your bladder and uterus, in place  Kegel exercises help prevent or control problems with urine incontinence (leakage)  Incontinence may be caused by pregnancy, childbirth, or menopause  Contact your healthcare provider if:   · You cannot feel your muscles tighten or relax  · You continue to leak urine  · You have questions or concerns about your condition or care  Use the correct muscles:  Pelvic muscles are the muscles you use to control urine flow  To target these muscles, stop and start the flow of urine several times  This will help you become familiar with how it feels to tighten and relax these muscles  How to do Kegel exercises:   · Empty your bladder  You may lie down, stand up, or sit down to do these exercises  When you first try to do these exercises, it may be easier if you lie down  Tighten or squeeze your pelvic muscles slowly  It may feel like you are trying to hold back urine or gas  Hold this position for 3 seconds  Relax for 3 seconds  Repeat this cycle 10 times  · Do 10 sets of Kegel exercises, at least 3 times a day  Do not hold your breath when you do Kegel exercises  Keep your stomach, back, and leg muscles relaxed  · As your muscles get stronger, you will be able to hold the squeeze longer  Your healthcare provider may ask that you increase your pelvic muscle squeeze to 10 seconds  After you squeeze for 10 seconds, relax for 10 seconds  What else you should know:   · Once you know how to do Kegel exercises, use different positions  You can do these exercises while you lie on the floor, sit at your desk or watch TV, and while you stand  · You may notice improved bladder control within about 6 weeks  · Tighten your pelvic muscles before you sneeze, cough, or lift to prevent urine leakage  Follow up with your healthcare provider as directed:  Write down your questions so you remember to ask them during your visits  © Copyright [employer] 2021 Information is for End User's use only and may not be sold, redistributed or otherwise used for commercial purposes  All illustrations and images included in CareNotes® are the copyrighted property of A D A M [employer] Inc  or Randa Ma  The above information is an  only  It is not intended as medical advice for individual conditions or treatments  Talk to your doctor, nurse or pharmacist before following any medical regimen to see if it is safe and effective for you

## 2021-07-21 NOTE — PROGRESS NOTES
Assessment/Plan:     Diagnoses and all orders for this visit:    PMB (postmenopausal bleeding)  -     Tissue Exam    Pre-procedure lab exam  -     Cancel: POCT urine HCG    Menopause    Stress incontinence    Other orders  -     patient supplied medication; Visiclear Capsules  Taking 2 capsule daily on an empty stomach to help eyesight            Discussed with patient that vaginal bleeding occurs in approximately 4-11% of postmenopausal patients  Discussed with patient possible etiologies  Differential diagnosis were discussed such as atrophy, endometrial hyperplasia, endometrial cancer, endometrial polyps uterine fibroids, adenomyosis, hormonal side effects (in women taking hormone therapy), anticoagulant therapy or infection  Hypoestrogenism can cause atrophy of the endometrium and vagina  Either endometrial biopsy or transvaginal pelvic ultrasound can be used as initial tests for evaluating the endometrium  Endometrial biopsy is initial diagnostic test for women with postmenopausal bleeding due to its high sensitivity, lower complication rate and low cost       However, endometrial biopsy is not a sensitive technique for diagnosing structural abnormalities such as polyps or uterine fibroids  Endometrial biopsy is required of endometrial lining is thicker than 4 mm, there is diffuse or focal thickening, if the endometrium is not adequately visualized or if the patient has persistent postmenopausal bleeding  If the patient has insufficient or non-diagnostic tissue from endometrial biopsy procedure or if there is persistent bleeding then further diagnosis with hysteroscopy D&C would be recommended  Postmenopausal women, uterine bleeding is usually self-limited  Exclusion of cancer is the main objective and treatment is usually on necessary once cancer has been excluded  Endometrial biopsy performed today - will call pt with results  Advised to call if with any postmenopausal bleeding  Discussed my recommendation for hysteroscopy or D and C if brown spotting/stianing persists  Also offered repeat US for surveillance but pt declines  She is also wondering if it may have come from her "urine" since she leaks  Advised trial tampon test         Subjective   Patient ID: Vahid Ascencio is a 46 y o  female  Patient is here for a follow-up  Chief Complaint   Patient presents with    Procedure     Endometrial Biopsy  Patient suspected on postmenopausal bleeding due to reports of brown discharge  She has been menopausal since 2018/ Pelvic US does show thickened endometrium  She had EMB 2 years ago and an US that showed 7 mm endometrium  She is not sexually active currently  She does report leakage of urine, especially with laughing and activity, wears a liner daily    She does report this brown staining at least once a month with breast tenderness  Menstrual History:  OB History        3    Para   3    Term   3       0    AB        Living   3       SAB   0    TAB   0    Ectopic   0    Multiple   0    Live Births   1                Menarche age: 15  Patient's last menstrual period was 10/02/2017           Past Medical History:   Diagnosis Date    Allergic        Social History     Tobacco Use    Smoking status: Former Smoker     Types: Cigarettes    Smokeless tobacco: Never Used   Vaping Use    Vaping Use: Never used   Substance Use Topics    Alcohol use: No    Drug use: No       Allergies   Allergen Reactions    Nicotine Cough, Headache, Sneezing and Nasal Congestion     smoke         Current Outpatient Medications:     cholecalciferol (VITAMIN D3) 1,000 units tablet, Take 1,000 Units by mouth daily, Disp: , Rfl:     Coenzyme Q10 (COQ-10) 200 MG CAPS, Take 1 capsule by mouth daily, Disp: , Rfl:     ibuprofen (MOTRIN) 200 mg tablet, Take 400 mg by mouth every 6 (six) hours as needed for mild pain, Disp: , Rfl:     loratadine (CLARITIN) 10 mg tablet, Take 10 mg by mouth daily, Disp: , Rfl:     patient supplied medication, Visiclear Capsules Taking 2 capsule daily on an empty stomach to help eyesight, Disp: , Rfl:     Probiotic Product (PROBIOTIC PO), Take 1 capsule by mouth daily, Disp: , Rfl:     Propylene Glycol (SYSTANE BALANCE OP), Apply 1 drop to eye as needed, Disp: , Rfl:     simethicone (MYLICON) 40 IU/5 6 mL drops, Take 40 mg by mouth 4 (four) times a day as needed for flatulence, Disp: , Rfl:     tetrahydrozoline (VISINE) 0 05 % ophthalmic solution, Administer 1 drop to both eyes 4 (four) times a day as needed for irritation, Disp: , Rfl:       Review of Systems   Constitutional: Negative  HENT: Negative  Eyes: Negative  Respiratory: Negative  Cardiovascular: Negative  Gastrointestinal: Negative  Endocrine: Negative  Genitourinary:        As noted in HPI   Musculoskeletal: Negative  Skin: Negative  Allergic/Immunologic: Negative  Neurological: Negative  Hematological: Negative  Psychiatric/Behavioral: Negative  /84 (BP Location: Left arm, Patient Position: Sitting, Cuff Size: Adult)   Pulse 102   Temp (!) 97 3 °F (36 3 °C) (Temporal)   Ht 5' 6" (1 676 m)   Wt 96 6 kg (213 lb)   LMP 10/02/2017   BMI 34 38 kg/m²       Physical Exam  Constitutional:       General: She is not in acute distress  Appearance: She is well-developed  Genitourinary:      Pelvic exam was performed with patient in the lithotomy position  Inguinal canal, urethra, bladder, cervix, uterus, right adnexa and left adnexa normal       No vulval lesion, tenderness, ulcerations, Bartholin's cyst or rash noted  No signs of labial injury  No posterior fourchette tenderness, injury, rash or lesion present  No signs of injury or lesions in the vagina  No vaginal discharge, erythema, tenderness, bleeding, atrophy or prolapse  No cervical polyp  Uterus is not enlarged or tender        No uterine mass detected  Uterus is regular  No right or left adnexal mass present  Right adnexa not tender or full  Left adnexa not tender or full  Abdominal:      General: There is no distension  Palpations: Abdomen is soft  Tenderness: There is no abdominal tenderness  Neurological:      Mental Status: She is alert and oriented to person, place, and time  Skin:     General: Skin is warm and dry  Psychiatric:         Behavior: Behavior normal                    Study Result    Narrative & Impression   PELVIC ULTRASOUND, COMPLETE     INDICATION:  46years old  Z78 0: Asymptomatic menopausal state  N89 8: Other specified noninflammatory disorders of vagina  Candi Oats discharge x2 years      COMPARISON: None     TECHNIQUE:   Transabdominal pelvic ultrasound was performed in sagittal and transverse planes with a curvilinear transducer  Additional transvaginal imaging was performed to better evaluate the endometrium and ovaries  Imaging included volumetric   sweeps as well as traditional still imaging technique      FINDINGS:     UTERUS:  The uterus is anteverted in position, measuring 6 7 x 4 3 x 5 9 cm  Contour and echotexture appear normal    A hypodense area in the fundus measures 4 mm  The cervix shows no suspicious abnormality      ENDOMETRIUM:    Thickened AP caliber of 6 mm  Homogeneous and normal in appearance      OVARIES/ADNEXA:  Right ovary:  1 7 x 1 1 x 0 8 cm  No suspicious right ovarian abnormality  Doppler flow within normal limits      Left ovary:  1 8 x 0 9 x 1 0 cm  No suspicious left ovarian abnormality  Doppler flow within normal limits      No suspicious adnexal mass or loculated collections    There is no free fluid      IMPRESSION:     Thickened endometrium in a postmenopausal woman measuring 6 mm      4 mm hypodense nodule in the fundus of uncertain significance                     Workstation performed: [id number]

## 2021-08-25 ENCOUNTER — OFFICE VISIT (OUTPATIENT)
Dept: FAMILY MEDICINE CLINIC | Facility: CLINIC | Age: 51
End: 2021-08-25
Payer: COMMERCIAL

## 2021-08-25 VITALS
BODY MASS INDEX: 34.46 KG/M2 | TEMPERATURE: 97.3 F | WEIGHT: 214.4 LBS | DIASTOLIC BLOOD PRESSURE: 80 MMHG | SYSTOLIC BLOOD PRESSURE: 128 MMHG | OXYGEN SATURATION: 98 % | HEART RATE: 78 BPM | HEIGHT: 66 IN | RESPIRATION RATE: 16 BRPM

## 2021-08-25 DIAGNOSIS — R79.89 LOW SERUM VITAMIN D: ICD-10-CM

## 2021-08-25 DIAGNOSIS — R03.0 BLOOD PRESSURE ELEVATED WITHOUT HISTORY OF HTN: Primary | ICD-10-CM

## 2021-08-25 DIAGNOSIS — R79.89 LFTS ABNORMAL: ICD-10-CM

## 2021-08-25 DIAGNOSIS — N39.0 URINARY TRACT INFECTION WITHOUT HEMATURIA, SITE UNSPECIFIED: ICD-10-CM

## 2021-08-25 DIAGNOSIS — Z12.31 SCREENING MAMMOGRAM, ENCOUNTER FOR: ICD-10-CM

## 2021-08-25 DIAGNOSIS — N28.9 ABNORMAL RENAL FUNCTION: ICD-10-CM

## 2021-08-25 DIAGNOSIS — Z12.12 SCREENING FOR COLORECTAL CANCER: ICD-10-CM

## 2021-08-25 DIAGNOSIS — E78.00 PURE HYPERCHOLESTEROLEMIA: ICD-10-CM

## 2021-08-25 DIAGNOSIS — E66.9 CLASS 1 OBESITY WITHOUT SERIOUS COMORBIDITY WITH BODY MASS INDEX (BMI) OF 30.0 TO 30.9 IN ADULT, UNSPECIFIED OBESITY TYPE: ICD-10-CM

## 2021-08-25 DIAGNOSIS — R74.8 ELEVATED ALKALINE PHOSPHATASE LEVEL: ICD-10-CM

## 2021-08-25 DIAGNOSIS — Z12.11 SCREENING FOR COLORECTAL CANCER: ICD-10-CM

## 2021-08-25 PROCEDURE — 99214 OFFICE O/P EST MOD 30 MIN: CPT | Performed by: FAMILY MEDICINE

## 2021-08-25 NOTE — PROGRESS NOTES
Assessment/Plan:       No problem-specific Assessment & Plan notes found for this encounter  Diagnoses and all orders for this visit:    Blood pressure elevated without history of HTN  Comments:  Advised patient to decrease salt intale and caffein intake  Check blood pressure at work and call if blood pressure 130/80 or higher    Urinary tract infection without hematuria, site unspecified  Comments:  improved  Increase fluid intake    Pure hypercholesterolemia  Comments:  to followwith low fat diet  LFTs abnormal  Comments:  Liver function test improved  except alkaline phosphatase    Low serum vitamin D  Comments:  continue vit D supplement    Abnormal renal function  Comments:  Increase fluid intake  Orders:  -     Cancel: US retroperitoneal complete; Future  -     Basic metabolic panel; Future    Elevated alkaline phosphatase level  -     Alkaline phosphatase, isoenzymes; Future    Class 1 obesity without serious comorbidity with body mass index (BMI) of 30 0 to 30 9 in adult, unspecified obesity type  Comments:  Advised to lose weight    Screening mammogram, encounter for  Comments:  Check mammogram   Order exit    Screening for colorectal cancer  Comments:  colonoscopy scheduled        Patient Instructions   To follow up with test results      Orders Placed This Encounter   Procedures    Alkaline phosphatase, isoenzymes     Standing Status:   Future     Number of Occurrences:   1     Standing Expiration Date:   8/25/2022    Basic metabolic panel     This is a patient instruction: Patient fasting for 8 hours or longer recommended  Standing Status:   Future     Number of Occurrences:   1     Standing Expiration Date:   8/25/2022         Subjective:     Patient ID: Lorena Zamarripa is a 46 y o  female      HPI high  Hyperlipidemia  Admit to regular fat intake  Denied chest pain  Vitamin-D deficiency  Denied change in skin or  Abnormal liver function  Patient does not drink alcohol    Denied abdominal  Obesity  Patient start going to the gym  Being evaluated by [facility]  Does not watch her diet  Urinary tract infection  Symptoms resolved  Patient was seen by gyn for abnormal vaginal discharge  GI workup was noted  Patient would like to defer her physical and the next office visit  Test results  Mammogram  Scheduled    labs 5-13-21 , 6-30 and 7-9-21  Discussed result with patient    Review of Systems   Constitutional: Negative for activity change, appetite change, chills, fatigue, fever and unexpected weight change  HENT: Negative for congestion, ear discharge, ear pain, hearing loss, nosebleeds, rhinorrhea, sinus pressure, sore throat, tinnitus, trouble swallowing and voice change  Eyes: Negative for photophobia, pain and visual disturbance  Respiratory: Negative for cough, chest tightness, shortness of breath and wheezing  Cardiovascular: Negative for chest pain, palpitations and leg swelling  Gastrointestinal: Negative for abdominal pain, anal bleeding, blood in stool, constipation, diarrhea, nausea and vomiting  Endocrine: Negative for cold intolerance, heat intolerance, polydipsia and polyuria  Genitourinary: Negative for dysuria, frequency, hematuria and urgency  Musculoskeletal: Negative for arthralgias, back pain, gait problem, joint swelling, myalgias and neck pain  Skin: Negative for rash  Neurological: Negative for dizziness, tremors, seizures, syncope, weakness, light-headedness and headaches  Hematological: Negative for adenopathy  Does not bruise/bleed easily  Psychiatric/Behavioral: Negative for agitation, behavioral problems, confusion, dysphoric mood, hallucinations and sleep disturbance  The patient is not nervous/anxious  Objective:     Physical Exam  Nursing note reviewed  Constitutional:       General: She is not in acute distress  Appearance: She is well-developed  HENT:      Head: Normocephalic     Eyes:      General: No scleral icterus  Extraocular Movements: Extraocular movements intact  Conjunctiva/sclera: Conjunctivae normal    Neck:      Thyroid: No thyromegaly  Vascular: No carotid bruit  Cardiovascular:      Rate and Rhythm: Normal rate and regular rhythm  Heart sounds: Normal heart sounds  Pulmonary:      Effort: Pulmonary effort is normal       Breath sounds: Normal breath sounds  Abdominal:      General: Bowel sounds are normal  There is no distension  Palpations: Abdomen is soft  There is no mass  Tenderness: There is no abdominal tenderness  There is no guarding or rebound  Hernia: No hernia is present  Musculoskeletal:         General: No swelling  Cervical back: Neck supple  Right lower leg: No edema  Left lower leg: No edema  Lymphadenopathy:      Cervical: No cervical adenopathy  Skin:     Findings: No rash  Neurological:      Mental Status: She is alert and oriented to person, place, and time  Cranial Nerves: No cranial nerve deficit  Motor: No abnormal muscle tone  Coordination: Coordination normal       Gait: Gait normal    Psychiatric:         Mood and Affect: Mood normal          Behavior: Behavior normal          Thought Content:  Thought content normal

## 2021-08-26 ENCOUNTER — LAB (OUTPATIENT)
Dept: LAB | Age: 51
End: 2021-08-26
Payer: COMMERCIAL

## 2021-08-26 ENCOUNTER — HOSPITAL ENCOUNTER (OUTPATIENT)
Dept: ULTRASOUND IMAGING | Facility: HOSPITAL | Age: 51
Discharge: HOME | End: 2021-08-26
Attending: FAMILY MEDICINE
Payer: COMMERCIAL

## 2021-08-26 DIAGNOSIS — R74.8 ELEVATED ALKALINE PHOSPHATASE LEVEL: ICD-10-CM

## 2021-08-26 DIAGNOSIS — N28.9 ABNORMAL RENAL FUNCTION: ICD-10-CM

## 2021-08-26 LAB
ANION GAP SERPL CALCULATED.3IONS-SCNC: 3 MMOL/L (ref 4–13)
BUN SERPL-MCNC: 11 MG/DL (ref 5–25)
CALCIUM SERPL-MCNC: 9.6 MG/DL (ref 8.3–10.1)
CHLORIDE SERPL-SCNC: 106 MMOL/L (ref 100–108)
CO2 SERPL-SCNC: 30 MMOL/L (ref 21–32)
CREAT SERPL-MCNC: 0.71 MG/DL (ref 0.6–1.3)
GFR SERPL CREATININE-BSD FRML MDRD: 99 ML/MIN/1.73SQ M
GLUCOSE P FAST SERPL-MCNC: 87 MG/DL (ref 65–99)
POTASSIUM SERPL-SCNC: 4.5 MMOL/L (ref 3.5–5.3)
SODIUM SERPL-SCNC: 139 MMOL/L (ref 136–145)

## 2021-08-26 PROCEDURE — 84075 ASSAY ALKALINE PHOSPHATASE: CPT

## 2021-08-26 PROCEDURE — 76770 US EXAM ABDO BACK WALL COMP: CPT

## 2021-08-26 PROCEDURE — 80048 BASIC METABOLIC PNL TOTAL CA: CPT

## 2021-08-26 PROCEDURE — 36415 COLL VENOUS BLD VENIPUNCTURE: CPT

## 2021-08-26 PROCEDURE — 84080 ASSAY ALKALINE PHOSPHATASES: CPT

## 2021-08-30 LAB
ALP BONE CFR SERPL: 34 % (ref 14–68)
ALP INTEST CFR SERPL: 0 % (ref 0–18)
ALP LIVER CFR SERPL: 66 % (ref 18–85)
ALP SERPL-CCNC: 137 IU/L (ref 48–121)

## 2021-09-06 ENCOUNTER — TELEPHONE (OUTPATIENT)
Dept: FAMILY MEDICINE CLINIC | Facility: CLINIC | Age: 51
End: 2021-09-06

## 2021-09-06 DIAGNOSIS — R74.8 ELEVATED ALKALINE PHOSPHATASE LEVEL: Primary | ICD-10-CM

## 2021-09-08 ENCOUNTER — TELEPHONE (OUTPATIENT)
Dept: FAMILY MEDICINE CLINIC | Facility: CLINIC | Age: 51
End: 2021-09-08

## 2021-09-08 NOTE — TELEPHONE ENCOUNTER
----- Message from Mel Hathaway MD sent at 9/6/2021  2:48 PM EDT -----  Alkaline phosphatase is still elevated  Isoenzyme are normal  Check GGT    5 nucleotidase

## 2021-09-09 ENCOUNTER — TELEPHONE (OUTPATIENT)
Dept: FAMILY MEDICINE CLINIC | Facility: CLINIC | Age: 51
End: 2021-09-09

## 2021-09-09 DIAGNOSIS — R74.8 ELEVATED ALKALINE PHOSPHATASE LEVEL: Primary | ICD-10-CM

## 2021-09-14 ENCOUNTER — APPOINTMENT (OUTPATIENT)
Dept: LAB | Age: 51
End: 2021-09-14
Payer: COMMERCIAL

## 2021-09-14 DIAGNOSIS — R74.8 ELEVATED ALKALINE PHOSPHATASE LEVEL: ICD-10-CM

## 2021-09-14 LAB — GGT SERPL-CCNC: 147 U/L (ref 5–85)

## 2021-09-14 PROCEDURE — 36415 COLL VENOUS BLD VENIPUNCTURE: CPT

## 2021-09-14 PROCEDURE — 83915 ASSAY OF NUCLEOTIDASE: CPT

## 2021-09-14 PROCEDURE — 82977 ASSAY OF GGT: CPT

## 2021-09-16 ENCOUNTER — OFFICE VISIT (OUTPATIENT)
Dept: GASTROENTEROLOGY | Facility: MEDICAL CENTER | Age: 51
End: 2021-09-16
Payer: COMMERCIAL

## 2021-09-16 VITALS — BODY MASS INDEX: 34.73 KG/M2 | WEIGHT: 215.2 LBS | TEMPERATURE: 97.4 F

## 2021-09-16 DIAGNOSIS — R74.8 ELEVATED ALKALINE PHOSPHATASE LEVEL: ICD-10-CM

## 2021-09-16 DIAGNOSIS — Z12.11 COLON CANCER SCREENING: Primary | ICD-10-CM

## 2021-09-16 PROCEDURE — 99213 OFFICE O/P EST LOW 20 MIN: CPT | Performed by: INTERNAL MEDICINE

## 2021-09-16 NOTE — PROGRESS NOTES
[facility] - Outpatient Follow-up Note  Narendra Whittaker 46 y o  female MRN: [id number]  Encounter: [id number]          ASSESSMENT AND PLAN:  55-year-old female with history of obesity who presents for evaluation  1  Colon cancer screening  She has No prior colonoscopy and is due for screening at this time  She is currently without GI complaints  She has no family history of colon cancer  I discussed the indication, risk and benefit of colonoscopy for colon cancer screening with her today  Informed consent was obtained for the procedure  Risks of infection, perforation and hemorrhage were discussed  The patient was agreeable to proceed with the procedure  - Colonoscopy; Future    2  Elevated alkaline phosphatase level  She was previously found to have mildly elevated alkaline phosphatase level  Workup for viral, autoimmune causes were unremarkable  She reports undergoing an ultrasound showing fatty liver  Recent alkaline phosphatase remains mildly elevated at 137 with elevated GGT as well  We discussed that this may be related to nonalcoholic fatty liver disease  We will continue to monitor her liver function tests periodically and if there is elevation in the absence of additional risk factors she may require liver biopsy in the future  ______________________________________________________________________    SUBJECTIVE:  55-year-old female with history of obesity who presents for evaluation  She was last seen in the GI office 2019 for elevated alkaline phosphatase  Right upper quadrant ultrasound showed hepatic steatosis and acute liver injury workup showed positive TANGELA but was otherwise unremarkable  This included negative celiac panel, AMA, anti smooth muscle and IgG is which were within normal limits  August 2021 liver tests show alkaline phosphatase 137 with fractionation of 66% liver and 34% bone  GGTP was also elevated        She is currently without GI complaints and denies abdominal pain, nausea or vomiting  Her appetite is good her weight is stable  She has regular, formed bowel movements without melena or hematochezia  Past surgical history includes appendectomy  She has no family history of colon cancer  She takes no anti-platelet or anticoagulant medications      REVIEW OF SYSTEMS IS OTHERWISE NEGATIVE  Ten point review of systems negative other than stated as per HPI    Historical Information   Past Medical History:   Diagnosis Date    Allergic      Past Surgical History:   Procedure Laterality Date    APPENDECTOMY      CERVICAL POLYPECTOMY      WISDOM TOOTH EXTRACTION       Social History   Social History     Substance and Sexual Activity   Alcohol Use No     Social History     Substance and Sexual Activity   Drug Use No     Social History     Tobacco Use   Smoking Status Former Smoker    Types: Cigarettes   Smokeless Tobacco Never Used     Family History   Problem Relation Age of Onset    Hypertension Family     Heart disease Family     Diabetes type II Family     Hypertension Mother     Heart disease Mother     Heart attack Father     Kidney disease Sister     Hypertension Sister        Meds/Allergies       Current Outpatient Medications:     cholecalciferol (VITAMIN D3) 1,000 units tablet    Coenzyme Q10 (COQ-10) 200 MG CAPS    patient supplied medication    Probiotic Product (PROBIOTIC PO)    Propylene Glycol (SYSTANE BALANCE OP)    simethicone (MYLICON) 40 QX/2 7 mL drops    tetrahydrozoline (VISINE) 0 05 % ophthalmic solution    ibuprofen (MOTRIN) 200 mg tablet    loratadine (CLARITIN) 10 mg tablet    Allergies   Allergen Reactions    Nicotine Cough, Headache, Sneezing and Nasal Congestion     smoke           Objective     Temperature (!) 97 4 °F (36 3 °C), weight 97 6 kg (215 lb 3 2 oz), last menstrual period 10/02/2017  Body mass index is 34 73 kg/m²        PHYSICAL EXAM:      General Appearance:   Alert, cooperative, no distress   HEENT:   Normocephalic, atraumatic, anicteric  Neck:  Supple, symmetrical, trachea midline   Lungs:   Clear to auscultation bilaterally; no rales, rhonchi or wheezing; respirations unlabored    Heart[de-identified]   Regular rate and rhythm; no murmur, rub, or gallop  Abdomen:   Soft, non-tender, non-distended; normal bowel sounds; no masses, no organomegaly    Genitalia:   Deferred    Rectal:   Deferred    Extremities:  No cyanosis, clubbing or edema    Pulses:  2+ and symmetric    Skin:  No jaundice, rashes, or lesions    Lymph nodes:  No palpable cervical lymphadenopathy        Lab Results:   No visits with results within 1 Day(s) from this visit  Latest known visit with results is:   Appointment on 09/14/2021   Component Date Value    GGT 09/14/2021 147*         Radiology Results:   US kidney and bladder    Result Date: 8/30/2021  Narrative: RENAL ULTRASOUND INDICATION:   N28 9: Disorder of kidney and ureter, unspecified  COMPARISON: None TECHNIQUE:   Ultrasound of the retroperitoneum was performed with a curvilinear transducer utilizing volumetric sweeps and still imaging techniques  FINDINGS: KIDNEYS: Evaluation mildly limited by bowel gas  Symmetric and normal size  Right kidney:  9 8 x 5 6 cm  Left kidney:  9 9 x 4 7 cm  Right kidney Normal echogenicity and contour  No suspicious masses detected  No hydronephrosis  No shadowing calculi  No perinephric fluid collections  Left kidney Normal echogenicity and contour  No suspicious masses detected  No hydronephrosis  No shadowing calculi  No perinephric fluid collections  URETERS: Nonvisualized  BLADDER: Normally distended  No focal thickening or mass lesions  Bilateral ureteral jets detected       Impression: No significant finding Workstation performed: [id number]

## 2021-09-20 LAB — MISCELLANEOUS LAB TEST RESULT: NORMAL

## 2021-09-22 ENCOUNTER — TELEPHONE (OUTPATIENT)
Dept: OTHER | Facility: OTHER | Age: 51
End: 2021-09-22

## 2021-09-22 ENCOUNTER — TELEPHONE (OUTPATIENT)
Dept: FAMILY MEDICINE CLINIC | Facility: CLINIC | Age: 51
End: 2021-09-22

## 2021-09-22 DIAGNOSIS — E83.39 ALKALINE PHOSPHATASE DEFICIENCY: Primary | ICD-10-CM

## 2021-09-22 NOTE — TELEPHONE ENCOUNTER
Per note pt had US in the past but I don't see a report  If it was many years ago I agree with the [address], but likely all related to fatty liver    Marlene Kellogg

## 2021-09-22 NOTE — TELEPHONE ENCOUNTER
----- Message from Tashi Moreira MD sent at 9/20/2021  2:39 PM EDT -----  GGT and 5 nucleotidase are elevated  Her increased alkaline phosphatases most likely secondary to liver disease    Check right upper quadrant abdominal ultrasound and refer patient to GI

## 2021-09-22 NOTE — TELEPHONE ENCOUNTER
Patient is calling regarding her labwork  She stated that her ggt was high which her GI provider, Dr Rafael Carmona, saw but her pcp is recommending a u/s on upper right quadrant however the patient stated that she informed her pcp that GI is aware of her fatty liver and elevated levels, please advise patient on what to do next, she stated she is not sure

## 2021-09-22 NOTE — TELEPHONE ENCOUNTER
Patient aware of test results  Patient states that she did see GI, they are unable to do her colonoscopy due to not having a ride home from the procedure  Placed orders for ultrasound

## 2021-11-22 ENCOUNTER — HOSPITAL ENCOUNTER (OUTPATIENT)
Dept: MAMMOGRAPHY | Facility: CLINIC | Age: 51
Discharge: HOME | End: 2021-11-22
Payer: COMMERCIAL

## 2021-11-22 DIAGNOSIS — Z12.31 SCREENING MAMMOGRAM, ENCOUNTER FOR: ICD-10-CM

## 2021-11-22 PROCEDURE — 77067 SCR MAMMO BI INCL CAD: CPT

## 2021-11-22 PROCEDURE — 77063 BREAST TOMOSYNTHESIS BI: CPT

## 2021-12-14 ENCOUNTER — OFFICE VISIT (OUTPATIENT)
Dept: FAMILY MEDICINE CLINIC | Facility: CLINIC | Age: 51
End: 2021-12-14
Payer: COMMERCIAL

## 2021-12-14 VITALS
OXYGEN SATURATION: 99 % | WEIGHT: 216.6 LBS | SYSTOLIC BLOOD PRESSURE: 122 MMHG | DIASTOLIC BLOOD PRESSURE: 82 MMHG | HEART RATE: 80 BPM | HEIGHT: 66 IN | BODY MASS INDEX: 34.81 KG/M2 | TEMPERATURE: 97 F

## 2021-12-14 DIAGNOSIS — R74.8 ELEVATED ALKALINE PHOSPHATASE LEVEL: ICD-10-CM

## 2021-12-14 DIAGNOSIS — E55.9 VITAMIN D DEFICIENCY: ICD-10-CM

## 2021-12-14 DIAGNOSIS — E66.9 OBESITY (BMI 30-39.9): ICD-10-CM

## 2021-12-14 DIAGNOSIS — E78.00 PURE HYPERCHOLESTEROLEMIA: ICD-10-CM

## 2021-12-14 DIAGNOSIS — N28.9 ABNORMAL RENAL FUNCTION: ICD-10-CM

## 2021-12-14 DIAGNOSIS — R74.8 HIGH GAMMA GLUTAMYL TRANSFERASE (GGT): ICD-10-CM

## 2021-12-14 DIAGNOSIS — Z00.00 WELL ADULT EXAM: Primary | ICD-10-CM

## 2021-12-14 DIAGNOSIS — D75.89 INCREASED MCV: ICD-10-CM

## 2021-12-14 DIAGNOSIS — Z71.85 IMMUNIZATION COUNSELING: ICD-10-CM

## 2021-12-14 PROCEDURE — 99396 PREV VISIT EST AGE 40-64: CPT | Performed by: FAMILY MEDICINE

## 2021-12-17 ENCOUNTER — HOSPITAL ENCOUNTER (OUTPATIENT)
Dept: ULTRASOUND IMAGING | Facility: HOSPITAL | Age: 51
Discharge: HOME | End: 2021-12-17
Attending: FAMILY MEDICINE
Payer: COMMERCIAL

## 2021-12-17 DIAGNOSIS — R74.8 HIGH GAMMA GLUTAMYL TRANSFERASE (GGT): ICD-10-CM

## 2021-12-17 DIAGNOSIS — R74.8 ELEVATED ALKALINE PHOSPHATASE LEVEL: ICD-10-CM

## 2021-12-17 PROCEDURE — 76705 ECHO EXAM OF ABDOMEN: CPT

## 2021-12-27 ENCOUNTER — CLINICAL SUPPORT (OUTPATIENT)
Dept: FAMILY MEDICINE CLINIC | Facility: CLINIC | Age: 51
End: 2021-12-27

## 2021-12-27 DIAGNOSIS — Z20.822 EXPOSURE TO COVID-19 VIRUS: Primary | ICD-10-CM

## 2021-12-27 PROCEDURE — U0005 INFEC AGEN DETEC AMPLI PROBE: HCPCS | Performed by: FAMILY MEDICINE

## 2021-12-27 PROCEDURE — U0003 INFECTIOUS AGENT DETECTION BY NUCLEIC ACID (DNA OR RNA); SEVERE ACUTE RESPIRATORY SYNDROME CORONAVIRUS 2 (SARS-COV-2) (CORONAVIRUS DISEASE [COVID-19]), AMPLIFIED PROBE TECHNIQUE, MAKING USE OF HIGH THROUGHPUT TECHNOLOGIES AS DESCRIBED BY CMS-2020-01-R: HCPCS | Performed by: FAMILY MEDICINE

## 2021-12-28 LAB — SARS-COV-2 RNA RESP QL NAA+PROBE: NEGATIVE

## 2021-12-29 ENCOUNTER — CLINICAL SUPPORT (OUTPATIENT)
Dept: FAMILY MEDICINE CLINIC | Facility: CLINIC | Age: 51
End: 2021-12-29

## 2021-12-29 DIAGNOSIS — B34.9 VIRAL INFECTION, UNSPECIFIED: Primary | ICD-10-CM

## 2021-12-29 PROCEDURE — U0003 INFECTIOUS AGENT DETECTION BY NUCLEIC ACID (DNA OR RNA); SEVERE ACUTE RESPIRATORY SYNDROME CORONAVIRUS 2 (SARS-COV-2) (CORONAVIRUS DISEASE [COVID-19]), AMPLIFIED PROBE TECHNIQUE, MAKING USE OF HIGH THROUGHPUT TECHNOLOGIES AS DESCRIBED BY CMS-2020-01-R: HCPCS | Performed by: FAMILY MEDICINE

## 2021-12-29 PROCEDURE — U0005 INFEC AGEN DETEC AMPLI PROBE: HCPCS | Performed by: FAMILY MEDICINE

## 2021-12-31 LAB — SARS-COV-2 RNA RESP QL NAA+PROBE: NEGATIVE

## 2022-03-10 ENCOUNTER — APPOINTMENT (OUTPATIENT)
Dept: LAB | Age: 52
End: 2022-03-10
Payer: COMMERCIAL

## 2022-03-10 DIAGNOSIS — E55.9 VITAMIN D DEFICIENCY: ICD-10-CM

## 2022-03-10 DIAGNOSIS — E78.00 PURE HYPERCHOLESTEROLEMIA: ICD-10-CM

## 2022-03-10 DIAGNOSIS — D75.89 INCREASED MCV: ICD-10-CM

## 2022-03-10 DIAGNOSIS — N28.9 ABNORMAL RENAL FUNCTION: ICD-10-CM

## 2022-03-10 LAB
25(OH)D3 SERPL-MCNC: 27.9 NG/ML (ref 30–100)
ALBUMIN SERPL BCP-MCNC: 3.9 G/DL (ref 3.5–5)
ALP SERPL-CCNC: 128 U/L (ref 46–116)
ALT SERPL W P-5'-P-CCNC: 35 U/L (ref 12–78)
AST SERPL W P-5'-P-CCNC: 16 U/L (ref 5–45)
BASOPHILS # BLD AUTO: 0.05 THOUSANDS/ΜL (ref 0–0.1)
BASOPHILS NFR BLD AUTO: 1 % (ref 0–1)
BILIRUB DIRECT SERPL-MCNC: 0.16 MG/DL (ref 0–0.2)
BILIRUB SERPL-MCNC: 0.49 MG/DL (ref 0.2–1)
CHOLEST SERPL-MCNC: 229 MG/DL
EOSINOPHIL # BLD AUTO: 0.09 THOUSAND/ΜL (ref 0–0.61)
EOSINOPHIL NFR BLD AUTO: 2 % (ref 0–6)
ERYTHROCYTE [DISTWIDTH] IN BLOOD BY AUTOMATED COUNT: 12.9 % (ref 11.6–15.1)
HCT VFR BLD AUTO: 41.1 % (ref 34.8–46.1)
HDLC SERPL-MCNC: 72 MG/DL
HGB BLD-MCNC: 13.1 G/DL (ref 11.5–15.4)
IMM GRANULOCYTES # BLD AUTO: 0.01 THOUSAND/UL (ref 0–0.2)
IMM GRANULOCYTES NFR BLD AUTO: 0 % (ref 0–2)
LDLC SERPL CALC-MCNC: 132 MG/DL (ref 0–100)
LYMPHOCYTES # BLD AUTO: 1.1 THOUSANDS/ΜL (ref 0.6–4.47)
LYMPHOCYTES NFR BLD AUTO: 23 % (ref 14–44)
MCH RBC QN AUTO: 31.4 PG (ref 26.8–34.3)
MCHC RBC AUTO-ENTMCNC: 31.9 G/DL (ref 31.4–37.4)
MCV RBC AUTO: 99 FL (ref 82–98)
MONOCYTES # BLD AUTO: 0.35 THOUSAND/ΜL (ref 0.17–1.22)
MONOCYTES NFR BLD AUTO: 7 % (ref 4–12)
NEUTROPHILS # BLD AUTO: 3.22 THOUSANDS/ΜL (ref 1.85–7.62)
NEUTS SEG NFR BLD AUTO: 67 % (ref 43–75)
NRBC BLD AUTO-RTO: 0 /100 WBCS
PLATELET # BLD AUTO: 263 THOUSANDS/UL (ref 149–390)
PMV BLD AUTO: 10.8 FL (ref 8.9–12.7)
PROT SERPL-MCNC: 7.9 G/DL (ref 6.4–8.2)
RBC # BLD AUTO: 4.17 MILLION/UL (ref 3.81–5.12)
TRIGL SERPL-MCNC: 123 MG/DL
WBC # BLD AUTO: 4.82 THOUSAND/UL (ref 4.31–10.16)

## 2022-03-10 PROCEDURE — 80076 HEPATIC FUNCTION PANEL: CPT

## 2022-03-10 PROCEDURE — 82306 VITAMIN D 25 HYDROXY: CPT

## 2022-03-10 PROCEDURE — 85025 COMPLETE CBC W/AUTO DIFF WBC: CPT

## 2022-03-10 PROCEDURE — 36415 COLL VENOUS BLD VENIPUNCTURE: CPT

## 2022-03-10 PROCEDURE — 80061 LIPID PANEL: CPT

## 2022-03-22 ENCOUNTER — OFFICE VISIT (OUTPATIENT)
Dept: FAMILY MEDICINE CLINIC | Facility: CLINIC | Age: 52
End: 2022-03-22
Payer: COMMERCIAL

## 2022-03-22 VITALS
HEART RATE: 80 BPM | HEIGHT: 66 IN | WEIGHT: 218 LBS | SYSTOLIC BLOOD PRESSURE: 128 MMHG | TEMPERATURE: 97.4 F | DIASTOLIC BLOOD PRESSURE: 88 MMHG | OXYGEN SATURATION: 99 % | BODY MASS INDEX: 35.03 KG/M2

## 2022-03-22 DIAGNOSIS — Z23 ENCOUNTER FOR IMMUNIZATION: ICD-10-CM

## 2022-03-22 DIAGNOSIS — I10 HYPERTENSION, UNSPECIFIED TYPE: ICD-10-CM

## 2022-03-22 DIAGNOSIS — E66.9 OBESITY (BMI 30-39.9): ICD-10-CM

## 2022-03-22 DIAGNOSIS — R74.8 ELEVATED ALKALINE PHOSPHATASE LEVEL: ICD-10-CM

## 2022-03-22 DIAGNOSIS — E78.00 PURE HYPERCHOLESTEROLEMIA: Primary | ICD-10-CM

## 2022-03-22 DIAGNOSIS — R74.8 HIGH GAMMA GLUTAMYL TRANSFERASE (GGT): ICD-10-CM

## 2022-03-22 DIAGNOSIS — D75.89 INCREASED MCV: ICD-10-CM

## 2022-03-22 DIAGNOSIS — E55.9 VITAMIN D DEFICIENCY: ICD-10-CM

## 2022-03-22 PROCEDURE — 99214 OFFICE O/P EST MOD 30 MIN: CPT | Performed by: FAMILY MEDICINE

## 2022-03-22 PROCEDURE — 90670 PCV13 VACCINE IM: CPT

## 2022-03-22 PROCEDURE — G0009 ADMIN PNEUMOCOCCAL VACCINE: HCPCS

## 2022-03-22 NOTE — PROGRESS NOTES
Assessment/Plan:       No problem-specific Assessment & Plan notes found for this encounter  Diagnoses and all orders for this visit:    Pure hypercholesterolemia  Comments:  Not well controlled  Recommend medication  Patient is not interested  To follow with low-fat diet    Elevated alkaline phosphatase level  Comments:  GI consult on 09/16/2021 noted  Patient to continue monitoring her labs  Recent alkaline phosphatase is less than her baseline    High gamma glutamyl transferase (GGT)    Increased MCV  -     Vitamin B12; Future  -     Folate; Future    Hypertension, unspecified type  Comments:  most likely secondary high salt and caffeine intake, and weight  Advised to monitor her blood pressure  To decrease salt and caffeine intake and to lose weight  Orders:  -     ECG 12 lead; Future    Obesity (BMI 30-39  9)  Comments:  Advised to lose weight    Vitamin D deficiency  Comments:  Continue vitamin-D supplement on the daily basis and will recheck vitamin-D level in 2-3 months  Orders:  -     Vitamin D 25 hydroxy; Future    Encounter for immunization  -     PNEUMOCOCCAL CONJUGATE VACCINE 13-VALENT GREATER THAN 6 MONTHS        Patient Instructions   To follow up with test resultsPneumococcal 13-Valent Vaccine, Diphtheria Conjugate (By injection)   Pneumococcal 13-Valent Vaccine, Diphtheria Conjugate ([id number]-vtf-RXC-al 13-VAY-lent VAX-een, dif-THEER-ee-a LJT-mpo-kgeh)  Prevents infections, such as pneumonia and meningitis  Brand Name(s): Prevnar 13   There may be other brand names for this medicine  When This Medicine Should Not Be Used: This vaccine is not right for everyone  You should not receive it if you had an allergic reaction to pneumococcal or diphtheria vaccine  How to Use This Medicine:   Injectable  · A nurse or other health provider will give you this medicine  This vaccine is usually given as a shot into a muscle in the thigh or upper arm    · The vaccine schedule is different for different people  ? Tell your doctor if your child was born prematurely  Children who were premature may need to follow a different schedule  ? Children younger than 10years of age: This vaccine is usually given as 3 or 4 separate shots over several months  Your child's doctor will tell you how many shots are needed and when to come back for the next one   ? Children older than 10years of age: This vaccine is given as a single shot  If your child recently received another pneumonia vaccine, this one should be given at least 8 weeks later  ? Adults older than 25years of age: This vaccine is given as a single dose  · It is very important for your child to receive all of the shots for the vaccine  · Missed dose: This vaccine must be given on a fixed schedule  If your child misses a dose, call your child's doctor for another appointment  Drugs and Foods to Avoid:   Ask your doctor or pharmacist before using any other medicine, including over-the-counter medicines, vitamins, and herbal products  · Some foods and medicines can affect how this vaccine works  Tell your doctor if you are receiving a treatment or medicine that causes a weak immune system  This includes radiation treatment, steroid medicine (including hydrocortisone, methylprednisolone, prednisolone, prednisone), or cancer medicine  Warnings While Using This Medicine:   · Tell your doctor if you are pregnant or breastfeeding  · Tell your doctor if you have a weak immune system  You may not be fully protected by this vaccine    Possible Side Effects While Using This Medicine:   Call your doctor right away if you notice any of these side effects:  · Allergic reaction: Itching or hives, swelling in your face or hands, swelling or tingling in your mouth or throat, chest tightness, trouble breathing  · High fever  If you notice these less serious side effects, talk with your doctor:   · Crying, irritability, or fussiness  · Joint or muscle pain  · Mild skin rash  · Pain, burning, redness, or swelling where the shot was given  · Poor appetite  · Sleep changes  If you notice other side effects that you think are caused by this medicine, tell your doctor  Call your doctor for medical advice about side effects  You may report side effects to FDA at 6-868-IBZ-4825    © Copyright [employer] 2022 Information is for End User's use only and may not be sold, redistributed or otherwise used for commercial purposes  The above information is an  only  It is not intended as medical advice for individual conditions or treatments  Talk to your doctor, nurse or pharmacist before following any medical regimen to see if it is safe and effective for you  Orders Placed This Encounter   Procedures    PNEUMOCOCCAL CONJUGATE VACCINE 13-VALENT GREATER THAN 6 MONTHS    Vitamin D 25 hydroxy     Standing Status:   Future     Standing Expiration Date:   3/22/2023    Vitamin B12     Standing Status:   Future     Standing Expiration Date:   4/24/2022    Folate     Standing Status:   Future     Standing Expiration Date:   3/24/2023    ECG 12 lead     Standing Status:   Future     Standing Expiration Date:   3/22/2023         Subjective:     Patient ID: Geoff Robles is a 46 y o  female      HPI  Elevated alkaline phosphatase  Did see GI  Dr Lakesha Hidalgo on 9-16-21  Patient denied abdominal pain or bone pain  elevated blood pressure  She admit to high salt intake and caffein intake  Denied headache or dizziness  Vitamin-D deficiency, taking vitamin-D supplement 2000 unit every once a while  Hyperlipidemia  Not following any diet  Denied chest pain    Test results  abd us  Lab done on March 10, 2022  Discussed result with patient  Review of Systems   Constitutional: Negative for activity change, appetite change, chills, fatigue, fever and unexpected weight change     HENT: Negative for congestion, ear pain, sore throat, tinnitus, trouble swallowing and voice change  Eyes: Negative for photophobia, pain and visual disturbance  Respiratory: Negative for cough, chest tightness and wheezing  Cardiovascular: Negative for chest pain, palpitations and leg swelling  Gastrointestinal: Negative for abdominal distention, abdominal pain, anal bleeding, blood in stool, constipation, diarrhea, nausea and rectal pain  Endocrine: Negative for cold intolerance, heat intolerance, polydipsia and polyuria  Genitourinary: Negative for decreased urine volume, difficulty urinating, dysuria, flank pain, frequency, hematuria and urgency  Musculoskeletal: Negative for arthralgias, back pain, gait problem, myalgias and neck pain  Skin: Negative for pallor and rash  Allergic/Immunologic: Negative for immunocompromised state  Neurological: Negative for dizziness, tremors, seizures, syncope, facial asymmetry, speech difficulty, light-headedness, numbness and headaches  Hematological: Negative for adenopathy  Does not bruise/bleed easily  Psychiatric/Behavioral: Negative for agitation, confusion, dysphoric mood and hallucinations  The patient is not nervous/anxious  Objective:     Physical Exam  Constitutional:       General: She is not in acute distress  Appearance: Normal appearance  She is well-developed  She is not ill-appearing or diaphoretic  HENT:      Head: Normocephalic and atraumatic  Eyes:      General: No scleral icterus  Right eye: No discharge  Left eye: No discharge  Extraocular Movements: Extraocular movements intact  Conjunctiva/sclera: Conjunctivae normal       Pupils: Pupils are equal, round, and reactive to light  Neck:      Thyroid: No thyromegaly  Vascular: No carotid bruit or JVD  Cardiovascular:      Rate and Rhythm: Normal rate and regular rhythm  Heart sounds: Normal heart sounds  No murmur heard  Comments: Extremities    No edema  Pulmonary:      Effort: Pulmonary effort is normal  Breath sounds: Normal breath sounds  Abdominal:      General: Bowel sounds are normal  There is no distension  Palpations: Abdomen is soft  There is no mass  Tenderness: There is no abdominal tenderness  There is no guarding or rebound  Hernia: No hernia is present  Musculoskeletal:      Right lower leg: No edema  Left lower leg: No edema  Lymphadenopathy:      Cervical: No cervical adenopathy  Skin:     Coloration: Skin is not pale  Findings: No rash  Neurological:      General: No focal deficit present  Mental Status: She is alert and oriented to person, place, and time  Cranial Nerves: No cranial nerve deficit  Sensory: No sensory deficit  Motor: No weakness or abnormal muscle tone  Coordination: Coordination normal       Gait: Gait normal    Psychiatric:         Mood and Affect: Mood normal          Behavior: Behavior normal          Thought Content:  Thought content normal          Judgment: Judgment normal

## 2022-03-24 PROBLEM — R74.8 HIGH GAMMA GLUTAMYL TRANSFERASE (GGT): Status: ACTIVE | Noted: 2022-03-24

## 2022-03-24 PROBLEM — I10 HYPERTENSION: Status: ACTIVE | Noted: 2022-03-24

## 2022-03-24 PROBLEM — D75.89 INCREASED MCV: Status: ACTIVE | Noted: 2022-03-24

## 2022-03-24 PROBLEM — E78.00 PURE HYPERCHOLESTEROLEMIA: Status: ACTIVE | Noted: 2022-03-24

## 2022-03-24 PROBLEM — E66.9 OBESITY (BMI 30-39.9): Status: ACTIVE | Noted: 2022-03-24

## 2022-03-24 PROBLEM — Z23 ENCOUNTER FOR IMMUNIZATION: Status: ACTIVE | Noted: 2022-03-24

## 2022-05-06 ENCOUNTER — APPOINTMENT (OUTPATIENT)
Dept: LAB | Age: 52
End: 2022-05-06
Payer: COMMERCIAL

## 2022-05-06 ENCOUNTER — CLINICAL SUPPORT (OUTPATIENT)
Dept: URGENT CARE | Age: 52
End: 2022-05-06
Payer: COMMERCIAL

## 2022-05-06 DIAGNOSIS — D75.89 INCREASED MCV: ICD-10-CM

## 2022-05-06 DIAGNOSIS — I10 HYPERTENSION, UNSPECIFIED TYPE: ICD-10-CM

## 2022-05-06 DIAGNOSIS — E55.9 VITAMIN D DEFICIENCY: ICD-10-CM

## 2022-05-06 DIAGNOSIS — Z00.8 HEALTH EXAMINATION IN POPULATION SURVEYS: ICD-10-CM

## 2022-05-06 LAB
25(OH)D3 SERPL-MCNC: 32.2 NG/ML (ref 30–100)
ATRIAL RATE: 63 BPM
CHOLEST SERPL-MCNC: 225 MG/DL
EST. AVERAGE GLUCOSE BLD GHB EST-MCNC: 111 MG/DL
FOLATE SERPL-MCNC: >20 NG/ML (ref 3.1–17.5)
HBA1C MFR BLD: 5.5 %
HDLC SERPL-MCNC: 73 MG/DL
LDLC SERPL CALC-MCNC: 132 MG/DL (ref 0–100)
NONHDLC SERPL-MCNC: 152 MG/DL
P AXIS: 40 DEGREES
PR INTERVAL: 136 MS
QRS AXIS: 12 DEGREES
QRSD INTERVAL: 82 MS
QT INTERVAL: 414 MS
QTC INTERVAL: 423 MS
T WAVE AXIS: 37 DEGREES
TRIGL SERPL-MCNC: 98 MG/DL
VENTRICULAR RATE: 63 BPM

## 2022-05-06 PROCEDURE — 82306 VITAMIN D 25 HYDROXY: CPT

## 2022-05-06 PROCEDURE — 83036 HEMOGLOBIN GLYCOSYLATED A1C: CPT

## 2022-05-06 PROCEDURE — 82746 ASSAY OF FOLIC ACID SERUM: CPT

## 2022-05-06 PROCEDURE — 36415 COLL VENOUS BLD VENIPUNCTURE: CPT

## 2022-05-06 PROCEDURE — 80061 LIPID PANEL: CPT

## 2022-05-06 PROCEDURE — 93010 ELECTROCARDIOGRAM REPORT: CPT | Performed by: INTERNAL MEDICINE

## 2022-05-06 PROCEDURE — 93005 ELECTROCARDIOGRAM TRACING: CPT

## 2022-05-13 ENCOUNTER — OFFICE VISIT (OUTPATIENT)
Dept: URGENT CARE | Age: 52
End: 2022-05-13
Payer: COMMERCIAL

## 2022-05-13 VITALS
DIASTOLIC BLOOD PRESSURE: 80 MMHG | SYSTOLIC BLOOD PRESSURE: 130 MMHG | HEIGHT: 67 IN | OXYGEN SATURATION: 97 % | TEMPERATURE: 98.4 F | BODY MASS INDEX: 34.06 KG/M2 | WEIGHT: 217 LBS | RESPIRATION RATE: 24 BRPM | HEART RATE: 105 BPM

## 2022-05-13 DIAGNOSIS — R68.89 FLU-LIKE SYMPTOMS: Primary | ICD-10-CM

## 2022-05-13 PROCEDURE — 99213 OFFICE O/P EST LOW 20 MIN: CPT

## 2022-05-13 PROCEDURE — 87636 SARSCOV2 & INF A&B AMP PRB: CPT

## 2022-05-13 NOTE — PROGRESS NOTES
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  At this time assessment notes post nasal drip, clear breath sounds   Will order COVID/FLu  Advised to continue taking OTC medications symptoms  Quarantine until results  F/U with PCP as needed    Patient Instructions     Quarantine  OTC medications  Follow up with PCP as needed  Proceed to  ER if symptoms worsen  Chief Complaint     Chief Complaint   Patient presents with    Cough     Patient states she started with cold symptoms, cough, sore throat,  sneezing  fatigue, bodyaches, and fever         History of Present Illness       The patient presents with URI complaints of sore throat, cough, congestion,  Fatigue, body aches, intermittent fevers  Has been taking OTC medications  Denies SOB  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  Review of Systems   Review of Systems   Constitutional: Positive for fatigue and fever  Negative for chills  HENT: Positive for congestion, postnasal drip, sneezing and sore throat  Negative for ear discharge, ear pain, sinus pressure and sinus pain  Eyes: Negative for pain and discharge  Respiratory: Positive for cough  Negative for chest tightness, shortness of breath and wheezing  Cardiovascular: Negative for chest pain and palpitations  Gastrointestinal: Negative for abdominal pain, diarrhea, nausea and vomiting  Genitourinary: Negative for difficulty urinating and dysuria  Musculoskeletal: Positive for myalgias  Negative for arthralgias  Skin: Negative for rash     Neurological: Negative for dizziness, syncope, light-headedness, numbness and headaches  Psychiatric/Behavioral: Negative for agitation  All other systems reviewed and are negative          Current Medications       Current Outpatient Medications:     cholecalciferol (VITAMIN D3) 1,000 units tablet, Take 1,000 Units by mouth daily, Disp: , Rfl:     Coenzyme Q10 (COQ-10) 200 MG CAPS, Take 1 capsule by mouth daily, Disp: , Rfl:     ibuprofen (MOTRIN) 200 mg tablet, Take 400 mg by mouth every 6 (six) hours as needed for mild pain, Disp: , Rfl:     loratadine (CLARITIN) 10 mg tablet, Take 10 mg by mouth in the morning , Disp: , Rfl:     patient supplied medication, Visiclear Capsules Taking 2 capsule daily on an empty stomach to help eyesight, Disp: , Rfl:     Probiotic Product (PROBIOTIC PO), Take 1 capsule by mouth daily, Disp: , Rfl:     Propylene Glycol (SYSTANE BALANCE OP), Apply 1 drop to eye as needed, Disp: , Rfl:     tetrahydrozoline (VISINE) 0 05 % ophthalmic solution, Administer 1 drop to both eyes 4 (four) times a day as needed for irritation, Disp: , Rfl:     simethicone (MYLICON) 40 GX/8 9 mL drops, Take 40 mg by mouth 4 (four) times a day as needed for flatulence (Patient not taking: Reported on 3/22/2022 ), Disp: , Rfl:     Current Allergies     Allergies as of 05/13/2022 - Reviewed 05/13/2022   Allergen Reaction Noted    Nicotine Cough, Headache, Sneezing, and Nasal Congestion 12/15/2015            The following portions of the patient's history were reviewed and updated as appropriate: allergies, current medications, past family history, past medical history, past social history, past surgical history and problem list      Past Medical History:   Diagnosis Date    Allergic        Past Surgical History:   Procedure Laterality Date    APPENDECTOMY      CERVICAL POLYPECTOMY      WISDOM TOOTH EXTRACTION         Family History   Problem Relation Age of Onset    Hypertension Family     Heart disease Family     Diabetes type II Family  Hypertension Mother     Heart disease Mother     Heart attack Father     Kidney disease Sister     Hypertension Sister          Medications have been verified  Objective   /80   Pulse 105   Temp 98 4 °F (36 9 °C)   Resp (!) 24   Ht 5' 6 5" (1 689 m)   Wt 98 4 kg (217 lb)   LMP 10/02/2017   SpO2 97%   BMI 34 50 kg/m²   Patient's last menstrual period was 10/02/2017  Physical Exam     Physical Exam  Vitals reviewed  Constitutional:       General: She is not in acute distress  Appearance: Normal appearance  She is not ill-appearing  HENT:      Head: Normocephalic and atraumatic  Right Ear: Tympanic membrane and ear canal normal  No middle ear effusion  Left Ear: Tympanic membrane and ear canal normal   No middle ear effusion  Nose: Congestion and rhinorrhea present  Mouth/Throat:      Mouth: Mucous membranes are moist       Pharynx: Posterior oropharyngeal erythema present  No oropharyngeal exudate  Tonsils: No tonsillar exudate  Eyes:      Extraocular Movements: Extraocular movements intact  Conjunctiva/sclera: Conjunctivae normal       Pupils: Pupils are equal, round, and reactive to light  Cardiovascular:      Rate and Rhythm: Normal rate and regular rhythm  Pulses: Normal pulses  Heart sounds: Normal heart sounds  No murmur heard  Pulmonary:      Effort: Pulmonary effort is normal  No respiratory distress  Breath sounds: Normal breath sounds  No wheezing  Abdominal:      General: Bowel sounds are normal  There is no distension  Palpations: Abdomen is soft  Tenderness: There is no abdominal tenderness  There is no guarding  Musculoskeletal:         General: Normal range of motion  Cervical back: Normal range of motion and neck supple  No tenderness  Lymphadenopathy:      Cervical: Cervical adenopathy present  Skin:     General: Skin is warm  Neurological:      General: No focal deficit present  Mental Status: She is alert     Psychiatric:         Mood and Affect: Mood normal          Behavior: Behavior normal          Judgment: Judgment normal

## 2022-05-13 NOTE — LETTER
[facility] [address]  [address] [address]  Dept: [phone]    May 13, 2022    Patient: Luisa Drain  YOB: 1970    Luisa Drain was seen and evaluated at our [facility]  Please note if Covid and Flu tests are negative, they may return to work when fever free for 24 hours without the use of a fever reducing agent  If Covid or Flu test is positive, they may return to work on 5/17/2022, as this is 5 days from the onset of symptoms  Upon return, they must then adhere to strict masking for an additional 5 days      Sincerely,    CARLOS Marr

## 2022-05-15 LAB
FLUAV RNA RESP QL NAA+PROBE: NEGATIVE
FLUBV RNA RESP QL NAA+PROBE: NEGATIVE
SARS-COV-2 RNA RESP QL NAA+PROBE: NEGATIVE

## 2022-05-26 ENCOUNTER — OFFICE VISIT (OUTPATIENT)
Dept: FAMILY MEDICINE CLINIC | Facility: CLINIC | Age: 52
End: 2022-05-26
Payer: COMMERCIAL

## 2022-05-26 VITALS
WEIGHT: 217 LBS | SYSTOLIC BLOOD PRESSURE: 130 MMHG | HEART RATE: 85 BPM | RESPIRATION RATE: 16 BRPM | HEIGHT: 67 IN | OXYGEN SATURATION: 98 % | DIASTOLIC BLOOD PRESSURE: 88 MMHG | BODY MASS INDEX: 34.06 KG/M2 | TEMPERATURE: 97.6 F

## 2022-05-26 DIAGNOSIS — E66.9 OBESITY (BMI 30-39.9): ICD-10-CM

## 2022-05-26 DIAGNOSIS — E78.00 PURE HYPERCHOLESTEROLEMIA: Primary | ICD-10-CM

## 2022-05-26 DIAGNOSIS — E55.9 VITAMIN D DEFICIENCY: ICD-10-CM

## 2022-05-26 DIAGNOSIS — R74.8 ELEVATED ALKALINE PHOSPHATASE LEVEL: ICD-10-CM

## 2022-05-26 DIAGNOSIS — R74.8 HIGH GAMMA GLUTAMYL TRANSFERASE (GGT): ICD-10-CM

## 2022-05-26 DIAGNOSIS — R94.31 ABNORMAL EKG: ICD-10-CM

## 2022-05-26 DIAGNOSIS — D75.89 INCREASED MCV: ICD-10-CM

## 2022-05-26 DIAGNOSIS — I10 HYPERTENSION, UNSPECIFIED TYPE: ICD-10-CM

## 2022-05-26 PROCEDURE — 99214 OFFICE O/P EST MOD 30 MIN: CPT | Performed by: FAMILY MEDICINE

## 2022-05-26 NOTE — PROGRESS NOTES
Assessment/Plan:       No problem-specific Assessment & Plan notes found for this encounter  Diagnoses and all orders for this visit:    Pure hypercholesterolemia  Comments:  LDL is not well  controlled   advised patient to start medication  Patient decline advised to follow low-cholesterol  Orders:  -     UA (URINE) with reflex to Scope    Elevated alkaline phosphatase level  Comments: Follow with GI    Increased MCV  -     CBC and differential; Future    High gamma glutamyl transferase (GGT)  Comments: Follow with GI    Hypertension, unspecified type  Comments:  most likely pt with  benign hypertension advised the weight follow with low salt and caffeine diet  To consider medication a blood pressure is not well control  Orders:  -     UA (URINE) with reflex to Scope  -     Comprehensive metabolic panel; Future    Obesity (BMI 30-39  9)  Comments:  Advised to lose weight    Vitamin D deficiency  Comments:  corrected , continue vit supplement    Abnormal EKG  Comments:  Left atrial enlargement  Orders:  -     Echo complete w/ contrast if indicated; Future        Patient Instructions   Follow-up test results      Orders Placed This Encounter   Procedures    CBC and differential     This is a patient instruction: This test is non-fasting  Please drink two glasses of water morning of bloodwork  Standing Status:   Future     Standing Expiration Date:   5/26/2023    UA (URINE) with reflex to Scope    Comprehensive metabolic panel     This is a patient instruction: Patient fasting for 8 hours or longer recommended  Standing Status:   Future     Standing Expiration Date:   5/26/2023    Echo complete w/ contrast if indicated     Standing Status:   Future     Standing Expiration Date:   5/26/2026     Scheduling Instructions: There is no prep required  Please bring your insurance cards, a form of photo ID and a list of your medications with you   Arrive 15 minutes prior to your appointment time in order to register  To schedule this appointment, please contact Central Scheduling at [phone]  Subjective:     Patient ID: Ilana Johnson is a 46 y o  female      HPI   Elevated blood pressure  Check her blood pressure at home before she it was 117 /82  Patient admit to high salt intake  And she drinks 3 caffeinated beverages a day  Denied headache or dizziness  Abnormal liver function test   She is following the GI  Denied abdominal pain  Vitamin-D deficiency  Denied myalgia  Increased MCV  Patient does not drink alcohol  Test results  abd us   EKG   lab on 5-13-22  Discussed result with patient    She had COVID vaccine booster 4 at [address] [place] [address]   Constitutional: Negative for activity change, appetite change, chills, fatigue, fever and unexpected weight change  HENT: Negative for congestion, ear discharge, ear pain, hearing loss, nosebleeds, rhinorrhea, sinus pressure, sore throat, tinnitus, trouble swallowing and voice change  Eyes: Negative for photophobia, pain and visual disturbance  Respiratory: Negative for cough, chest tightness, shortness of breath and wheezing  Cardiovascular: Negative for chest pain, palpitations and leg swelling  Gastrointestinal: Negative for abdominal pain, anal bleeding, blood in stool, constipation, diarrhea, nausea and vomiting  Endocrine: Negative for cold intolerance, heat intolerance, polydipsia and polyuria  Genitourinary: Negative for dysuria, frequency, hematuria and urgency  Musculoskeletal: Negative for arthralgias, back pain, gait problem, joint swelling, myalgias and neck pain  Skin: Negative for rash  Neurological: Negative for dizziness, tremors, seizures, syncope, weakness, light-headedness and headaches  Hematological: Negative for adenopathy  Does not bruise/bleed easily     Psychiatric/Behavioral: Negative for agitation, behavioral problems, confusion, dysphoric mood, hallucinations and sleep disturbance  The patient is not nervous/anxious  Objective:     Physical Exam  Constitutional:       Appearance: Normal appearance  She is well-developed  She is not ill-appearing  HENT:      Head: Normocephalic  Eyes:      General: No scleral icterus  Pupils: Pupils are equal, round, and reactive to light  Cardiovascular:      Rate and Rhythm: Regular rhythm  Pulmonary:      Effort: Pulmonary effort is normal       Breath sounds: Normal breath sounds  Abdominal:      General: Bowel sounds are normal  There is no distension  Palpations: There is no mass  Tenderness: There is no abdominal tenderness  There is no guarding  Musculoskeletal:      Left lower leg: No edema  Lymphadenopathy:      Cervical: No cervical adenopathy  Skin:     Findings: No rash  Neurological:      General: No focal deficit present  Motor: No weakness        Coordination: Coordination normal       Gait: Gait normal

## 2022-07-12 NOTE — PATIENT INSTRUCTIONS
Wellness Visit for Adults   AMBULATORY CARE:   A wellness visit  is when you see your healthcare provider to get screened for health problems  Your healthcare provider will also give you advice on how to stay healthy  Write down your questions so you remember to ask them  Ask your healthcare provider how often you should have a wellness visit  What happens at a wellness visit:  Your healthcare provider will ask about your health, and your family history of health problems  This includes high blood pressure, heart disease, and cancer  He or she will ask if you have symptoms that concern you, if you smoke, and about your mood  You may also be asked about your intake of medicines, supplements, food, and alcohol  Any of the following may be done: Your weight  will be checked  Your height may also be checked so your body mass index (BMI) can be calculated  Your BMI shows if you are at a healthy weight  Your blood pressure  and heart rate will be checked  Your temperature may also be checked  Blood and urine tests  may be done  Blood tests may be done to check your cholesterol levels  Abnormal cholesterol levels increase your risk for heart disease and stroke  You may also need a blood or urine test to check for diabetes if you are at increased risk  Urine tests may be done to look for signs of an infection or kidney disease  A physical exam  includes checking your heartbeat and lungs with a stethoscope  Your healthcare provider may also check your skin to look for sun damage  Screening tests  may be recommended  A screening test is done to check for diseases that may not cause symptoms  The screening tests you may need depend on your age, gender, family history, and lifestyle habits  For example, colorectal screening may be recommended if you are 48years old or older  Screening tests you need if you are a woman:   A Pap smear  is used to screen for cervical cancer   Pap smears are usually done every 3 to 5 years depending on your age  You may need them more often if you have had abnormal Pap smear test results in the past  Ask your healthcare provider how often you should have a Pap smear  A mammogram  is an x-ray of your breasts to screen for breast cancer  Experts recommend mammograms every 2 years starting at age 48 years  You may need a mammogram at age 52 years or younger if you have an increased risk for breast cancer  Talk to your healthcare provider about when you should start having mammograms and how often you need them  Vaccines you may need:   Get an influenza vaccine  every year  The influenza vaccine protects you from the flu  Several types of viruses cause the flu  The viruses change over time, so new vaccines are made each year  Get a tetanus-diphtheria (Td) booster vaccine  every 10 years  This vaccine protects you against tetanus and diphtheria  Tetanus is a severe infection that may cause painful muscle spasms and lockjaw  Diphtheria is a severe bacterial infection that causes a thick covering in the back of your mouth and throat  Get a human papillomavirus (HPV) vaccine  if you are female and aged 23 to 32 or male 23 to 24 and never received it  This vaccine protects you from HPV infection  HPV is the most common infection spread by sexual contact  HPV may also cause vaginal, penile, and anal cancers  Get a pneumococcal vaccine  if you are aged 72 years or older  The pneumococcal vaccine is an injection given to protect you from pneumococcal disease  Pneumococcal disease is an infection caused by pneumococcal bacteria  The infection may cause pneumonia, meningitis, or an ear infection  Get a shingles vaccine  if you are 60 or older, even if you have had shingles before  The shingles vaccine is an injection to protect you from the varicella-zoster virus  This is the same virus that causes chickenpox   Shingles is a painful rash that develops in people who had chickenpox or have been exposed to the virus  How to eat healthy:  My Plate is a model for planning healthy meals  It shows the types and amounts of foods that should go on your plate  Fruits and vegetables make up about half of your plate, and grains and protein make up the other half  A serving of dairy is included on the side of your plate  The amount of calories and serving sizes you need depends on your age, gender, weight, and height  Examples of healthy foods are listed below:  Eat a variety of vegetables  such as dark green, red, and orange vegetables  You can also include canned vegetables low in sodium (salt) and frozen vegetables without added butter or sauces  Eat a variety of fresh fruits , canned fruit in 100% juice, frozen fruit, and dried fruit  Include whole grains  At least half of the grains you eat should be whole grains  Examples include whole-wheat bread, wheat pasta, brown rice, and whole-grain cereals such as oatmeal     Eat a variety of protein foods such as seafood (fish and shellfish), lean meat, and poultry without skin (turkey and chicken)  Examples of lean meats include pork leg, shoulder, or tenderloin, and beef round, sirloin, tenderloin, and extra lean ground beef  Other protein foods include eggs and egg substitutes, beans, peas, soy products, nuts, and seeds  Choose low-fat dairy products such as skim or 1% milk or low-fat yogurt, cheese, and cottage cheese  Limit unhealthy fats  such as butter, hard margarine, and shortening  Exercise:  Exercise at least 30 minutes per day on most days of the week  Some examples of exercise include walking, biking, dancing, and swimming  You can also fit in more physical activity by taking the stairs instead of the elevator or parking farther away from stores  Include muscle strengthening activities 2 days each week  Regular exercise provides many health benefits   It helps you manage your weight, and decreases your risk for type 2 diabetes, heart disease, stroke, and high blood pressure  Exercise can also help improve your mood  Ask your healthcare provider about the best exercise plan for you  General health and safety guidelines:   Do not smoke  Nicotine and other chemicals in cigarettes and cigars can cause lung damage  Ask your healthcare provider for information if you currently smoke and need help to quit  E-cigarettes or smokeless tobacco still contain nicotine  Talk to your healthcare provider before you use these products  Limit alcohol  A drink of alcohol is 12 ounces of beer, 5 ounces of wine, or 1½ ounces of liquor  Lose weight, if needed  Being overweight increases your risk of certain health conditions  These include heart disease, high blood pressure, type 2 diabetes, and certain types of cancer  Protect your skin  Do not sunbathe or use tanning beds  Use sunscreen with a SPF 15 or higher  Apply sunscreen at least 15 minutes before you go outside  Reapply sunscreen every 2 hours  Wear protective clothing, hats, and sunglasses when you are outside  Drive safely  Always wear your seatbelt  Make sure everyone in your car wears a seatbelt  A seatbelt can save your life if you are in an accident  Do not use your cell phone when you are driving  This could distract you and cause an accident  Pull over if you need to make a call or send a text message  Practice safe sex  Use latex condoms if are sexually active and have more than one partner  Your healthcare provider may recommend screening tests for sexually transmitted infections (STIs)  Wear helmets, lifejackets, and protective gear  Always wear a helmet when you ride a bike or motorcycle, go skiing, or play sports that could cause a head injury  Wear protective equipment when you play sports  Wear a lifejacket when you are on a boat or doing water sports      © Copyright [address] 2022 Information is for End User's use only and may not be sold, redistributed or otherwise used for commercial purposes  All illustrations and images included in CareNotes® are the copyrighted property of [employer]  or [employer]  The above information is an  only  It is not intended as medical advice for individual conditions or treatments  Talk to your doctor, nurse or pharmacist before following any medical regimen to see if it is safe and effective for you

## 2022-07-12 NOTE — PROGRESS NOTES
Assessment        Diagnoses and all orders for this visit:    Encntr for gyn exam (general) (routine) w/o abn findings    Encounter for screening mammogram for breast cancer  -     Mammo screening bilateral w 3d & cad; Future    Asymptomatic menopausal state    Screening for colorectal cancer  -     Cologuard    Cutaneous skin tags  -     Ambulatory Referral to Dermatology; Future             Plan      All questions answered  Contraception: post menopausal status  Discussed healthy lifestyle modifications  Educational material distributed  Follow up in 1 year  Follow up as needed  Mammogram      PAP deferred  COLOGARD - unable to do colonoscopy     Discussed menopausal education  Advised healthy diet and weight bearing exercise  Advised intake of calcium and vitamin D  Advised use of lubricants if with vaginal dryness  Advised to call if with postmenopausal bleeding  Dermatology referral for skin tags axilla      Aleksandr Miller is a 46 y o  female who presents for annual exam       Chief Complaint   Patient presents with    Gynecologic Exam     Patient reports no concerns  Patient unable to do colonoscopy due to difficulty finding a ride home  She has no periods since     She is not sexually active  She has no bleeding      Last Pap: 21 NILM neg HPV  Last mammogram: 21  Colorectal cancer screening: none      Current contraception: post menopausal status  History of abnormal Pap smear: yes - HPV  History of abnormal mammogram: no  Family history of uterine or ovarian cancer: no  Family history of breast cancer: no  Family history of colon cancer: no      Menstrual History:  OB History        3    Para   3    Term   3       0    AB        Living   3       SAB   0    IAB   0    Ectopic   0    Multiple   0    Live Births   1                Menarche age: 15  Patient's last menstrual period was 10/02/2017               Past Medical History:   Diagnosis Date    Allergic      Past Surgical History:   Procedure Laterality Date    APPENDECTOMY      CERVICAL POLYPECTOMY      WISDOM TOOTH EXTRACTION       Family History   Problem Relation Age of Onset    Hypertension Family     Heart disease Family     Diabetes type II Family     Hypertension Mother     Heart disease Mother     Heart attack Father     Kidney disease Sister     Hypertension Sister        Social History     Tobacco Use    Smoking status: Former Smoker     Types: Cigarettes    Smokeless tobacco: Never Used   Vaping Use    Vaping Use: Never used   Substance Use Topics    Alcohol use: No    Drug use: No          Current Outpatient Medications:     cholecalciferol (VITAMIN D3) 1,000 units tablet, Take 2,000 Units by mouth daily, Disp: , Rfl:     Coenzyme Q10 (COQ-10) 200 MG CAPS, Take 1 capsule by mouth daily, Disp: , Rfl:     ibuprofen (MOTRIN) 200 mg tablet, Take 400 mg by mouth every 6 (six) hours as needed for mild pain, Disp: , Rfl:     loratadine (CLARITIN) 10 mg tablet, Take 10 mg by mouth in the morning , Disp: , Rfl:     patient supplied medication, Visiclear Capsules Taking 2 capsule daily on an empty stomach to help eyesight, Disp: , Rfl:     Probiotic Product (PROBIOTIC PO), Take 1 capsule by mouth daily, Disp: , Rfl:     Propylene Glycol (SYSTANE BALANCE OP), Apply 1 drop to eye as needed, Disp: , Rfl:     simethicone (MYLICON) 40 AS/0 9 mL drops, Take 40 mg by mouth 4 (four) times a day as needed for flatulence, Disp: , Rfl:     tetrahydrozoline (VISINE) 0 05 % ophthalmic solution, Administer 1 drop to both eyes 4 (four) times a day as needed for irritation, Disp: , Rfl:     Allergies   Allergen Reactions    Nicotine Cough, Headache, Sneezing and Nasal Congestion     smoke           Review of Systems   Constitutional: Negative  HENT: Negative  Eyes: Negative  Respiratory: Negative  Cardiovascular: Negative  Gastrointestinal: Negative  Endocrine: Negative  Genitourinary:        As noted in HPI   Musculoskeletal: Negative  Skin: Negative  Allergic/Immunologic: Negative  Neurological: Negative  Hematological: Negative  Psychiatric/Behavioral: Negative  /80 (BP Location: Right arm, Patient Position: Sitting, Cuff Size: Adult)   Pulse 92   Temp 98 °F (36 7 °C) (Tympanic)   Ht 5' 6 5" (1 689 m)   Wt 101 kg (223 lb)   LMP 10/02/2017   BMI 35 45 kg/m²         Physical Exam  Constitutional:       Appearance: She is well-developed  Genitourinary:      Vulva, bladder and rectum normal       No lesions in the vagina  Genitourinary Comments:         Right Labia: No rash, tenderness, lesions, skin changes or Bartholin's cyst      Left Labia: No tenderness, lesions, skin changes, Bartholin's cyst or rash  No inguinal adenopathy present in the right or left side  No vaginal discharge, tenderness or bleeding  No vaginal prolapse present  No vaginal atrophy present  Right Adnexa: not tender, not full and no mass present  Left Adnexa: not tender, not full and no mass present  No cervical motion tenderness, friability, lesion or polyp  Uterus is not enlarged or tender  Pelvic exam was performed with patient in the lithotomy position  Rectum:      No external hemorrhoid  Breasts:      Right: No mass, nipple discharge, skin change or tenderness  Left: No mass, nipple discharge, skin change or tenderness  HENT:      Head: Normocephalic  Nose: Nose normal    Eyes:      Conjunctiva/sclera: Conjunctivae normal    Neck:      Thyroid: No thyromegaly  Cardiovascular:      Rate and Rhythm: Normal rate and regular rhythm  Heart sounds: Normal heart sounds  No murmur heard  Pulmonary:      Effort: Pulmonary effort is normal  No respiratory distress  Breath sounds: Normal breath sounds  No wheezing or rales  Abdominal:      General: There is no distension        Palpations: Abdomen is soft  There is no mass  Tenderness: There is no abdominal tenderness  There is no guarding or rebound  Musculoskeletal:         General: No tenderness  Cervical back: Neck supple  No muscular tenderness  Lymphadenopathy:      Cervical: No cervical adenopathy  Lower Body: No right inguinal adenopathy  No left inguinal adenopathy  Neurological:      Mental Status: She is alert and oriented to person, place, and time  Skin:     General: Skin is warm and dry     Psychiatric:         Mood and Affect: Mood normal          Behavior: Behavior normal          Skin tag - axilla

## 2022-07-15 ENCOUNTER — ANNUAL EXAM (OUTPATIENT)
Dept: OBGYN CLINIC | Facility: CLINIC | Age: 52
End: 2022-07-15
Payer: COMMERCIAL

## 2022-07-15 VITALS
WEIGHT: 223 LBS | HEART RATE: 92 BPM | TEMPERATURE: 98 F | BODY MASS INDEX: 35 KG/M2 | HEIGHT: 67 IN | SYSTOLIC BLOOD PRESSURE: 124 MMHG | DIASTOLIC BLOOD PRESSURE: 80 MMHG

## 2022-07-15 DIAGNOSIS — Z12.31 ENCOUNTER FOR SCREENING MAMMOGRAM FOR BREAST CANCER: ICD-10-CM

## 2022-07-15 DIAGNOSIS — Z01.419 ENCNTR FOR GYN EXAM (GENERAL) (ROUTINE) W/O ABN FINDINGS: Primary | ICD-10-CM

## 2022-07-15 DIAGNOSIS — Z12.11 SCREENING FOR COLORECTAL CANCER: ICD-10-CM

## 2022-07-15 DIAGNOSIS — L91.8 CUTANEOUS SKIN TAGS: ICD-10-CM

## 2022-07-15 DIAGNOSIS — Z12.12 SCREENING FOR COLORECTAL CANCER: ICD-10-CM

## 2022-07-15 DIAGNOSIS — Z78.0 ASYMPTOMATIC MENOPAUSAL STATE: ICD-10-CM

## 2022-07-15 PROCEDURE — S0612 ANNUAL GYNECOLOGICAL EXAMINA: HCPCS | Performed by: OBSTETRICS & GYNECOLOGY

## 2022-07-23 LAB — COLOGUARD RESULT REPORTABLE: NEGATIVE

## 2022-08-11 ENCOUNTER — HOSPITAL ENCOUNTER (OUTPATIENT)
Dept: NON INVASIVE DIAGNOSTICS | Facility: HOSPITAL | Age: 52
Discharge: HOME | End: 2022-08-11
Payer: COMMERCIAL

## 2022-08-11 VITALS
BODY MASS INDEX: 35 KG/M2 | DIASTOLIC BLOOD PRESSURE: 80 MMHG | HEIGHT: 67 IN | WEIGHT: 223 LBS | HEART RATE: 70 BPM | SYSTOLIC BLOOD PRESSURE: 124 MMHG

## 2022-08-11 DIAGNOSIS — R94.31 ABNORMAL EKG: ICD-10-CM

## 2022-08-11 LAB
AORTIC ROOT: 3.1 CM
AORTIC VALVE MEAN VELOCITY: 8.5 M/S
APICAL FOUR CHAMBER EJECTION FRACTION: 60 %
ASCENDING AORTA: 3.1 CM
AV LVOT MEAN GRADIENT: 3 MMHG
AV LVOT PEAK GRADIENT: 6 MMHG
AV MEAN GRADIENT: 3 MMHG
AV PEAK GRADIENT: 6 MMHG
AV VELOCITY RATIO: 0.95
DOP CALC AO PEAK VEL: 1.26 M/S
DOP CALC AO VTI: 26.65 CM
DOP CALC LVOT PEAK VEL VTI: 24.43 CM
DOP CALC LVOT PEAK VEL: 1.2 M/S
E WAVE DECELERATION TIME: 197 MS
FRACTIONAL SHORTENING: 38 % (ref 28–44)
INTERVENTRICULAR SEPTUM IN DIASTOLE (PARASTERNAL SHORT AXIS VIEW): 0.8 CM
INTERVENTRICULAR SEPTUM: 0.8 CM (ref 0.6–1.1)
LAAS-AP2: 18.5 CM2
LAAS-AP4: 19.5 CM2
LEFT ATRIUM SIZE: 4.3 CM
LEFT INTERNAL DIMENSION IN SYSTOLE: 3.2 CM (ref 2.1–4)
LEFT VENTRICULAR INTERNAL DIMENSION IN DIASTOLE: 5.2 CM (ref 3.5–6)
LEFT VENTRICULAR POSTERIOR WALL IN END DIASTOLE: 0.8 CM
LEFT VENTRICULAR STROKE VOLUME: 88 ML
LVSV (TEICH): 88 ML
MV E'TISSUE VEL-SEP: 11 CM/S
MV PEAK A VEL: 0.72 M/S
MV PEAK E VEL: 75 CM/S
MV STENOSIS PRESSURE HALF TIME: 57 MS
MV VALVE AREA P 1/2 METHOD: 3.86 CM2
RIGHT VENTRICLE ID DIMENSION: 3.7 CM
SL CV LEFT ATRIUM LENGTH A2C: 5 CM
SL CV PED ECHO LEFT VENTRICLE DIASTOLIC VOLUME (MOD BIPLANE) 2D: 128 ML
SL CV PED ECHO LEFT VENTRICLE SYSTOLIC VOLUME (MOD BIPLANE) 2D: 40 ML
TR MAX PG: 16 MMHG
TR PEAK VELOCITY: 2 M/S
TRICUSPID VALVE PEAK REGURGITATION VELOCITY: 2.01 M/S

## 2022-08-11 PROCEDURE — 93306 TTE W/DOPPLER COMPLETE: CPT | Performed by: INTERNAL MEDICINE

## 2022-08-11 PROCEDURE — 93306 TTE W/DOPPLER COMPLETE: CPT

## 2022-08-19 ENCOUNTER — APPOINTMENT (OUTPATIENT)
Dept: LAB | Age: 52
End: 2022-08-19
Payer: COMMERCIAL

## 2022-08-19 DIAGNOSIS — D75.89 INCREASED MCV: ICD-10-CM

## 2022-08-19 DIAGNOSIS — I10 HYPERTENSION, UNSPECIFIED TYPE: ICD-10-CM

## 2022-08-19 LAB
ALBUMIN SERPL BCP-MCNC: 3.7 G/DL (ref 3.5–5)
ALP SERPL-CCNC: 133 U/L (ref 46–116)
ALT SERPL W P-5'-P-CCNC: 42 U/L (ref 12–78)
ANION GAP SERPL CALCULATED.3IONS-SCNC: 6 MMOL/L (ref 4–13)
AST SERPL W P-5'-P-CCNC: 26 U/L (ref 5–45)
BACTERIA UR QL AUTO: NORMAL /HPF
BASOPHILS # BLD AUTO: 0.06 THOUSANDS/ΜL (ref 0–0.1)
BASOPHILS NFR BLD AUTO: 1 % (ref 0–1)
BILIRUB SERPL-MCNC: 0.51 MG/DL (ref 0.2–1)
BILIRUB UR QL STRIP: NEGATIVE
BUN SERPL-MCNC: 14 MG/DL (ref 5–25)
CALCIUM SERPL-MCNC: 9.3 MG/DL (ref 8.3–10.1)
CHLORIDE SERPL-SCNC: 105 MMOL/L (ref 96–108)
CLARITY UR: CLEAR
CO2 SERPL-SCNC: 27 MMOL/L (ref 21–32)
COLOR UR: COLORLESS
CREAT SERPL-MCNC: 0.86 MG/DL (ref 0.6–1.3)
EOSINOPHIL # BLD AUTO: 0.1 THOUSAND/ΜL (ref 0–0.61)
EOSINOPHIL NFR BLD AUTO: 2 % (ref 0–6)
ERYTHROCYTE [DISTWIDTH] IN BLOOD BY AUTOMATED COUNT: 13 % (ref 11.6–15.1)
GFR SERPL CREATININE-BSD FRML MDRD: 77 ML/MIN/1.73SQ M
GLUCOSE P FAST SERPL-MCNC: 99 MG/DL (ref 65–99)
GLUCOSE UR STRIP-MCNC: NEGATIVE MG/DL
HCT VFR BLD AUTO: 41.8 % (ref 34.8–46.1)
HGB BLD-MCNC: 13.2 G/DL (ref 11.5–15.4)
HGB UR QL STRIP.AUTO: NEGATIVE
IMM GRANULOCYTES # BLD AUTO: 0.01 THOUSAND/UL (ref 0–0.2)
IMM GRANULOCYTES NFR BLD AUTO: 0 % (ref 0–2)
KETONES UR STRIP-MCNC: NEGATIVE MG/DL
LEUKOCYTE ESTERASE UR QL STRIP: ABNORMAL
LYMPHOCYTES # BLD AUTO: 1.23 THOUSANDS/ΜL (ref 0.6–4.47)
LYMPHOCYTES NFR BLD AUTO: 29 % (ref 14–44)
MCH RBC QN AUTO: 31 PG (ref 26.8–34.3)
MCHC RBC AUTO-ENTMCNC: 31.6 G/DL (ref 31.4–37.4)
MCV RBC AUTO: 98 FL (ref 82–98)
MONOCYTES # BLD AUTO: 0.25 THOUSAND/ΜL (ref 0.17–1.22)
MONOCYTES NFR BLD AUTO: 6 % (ref 4–12)
NEUTROPHILS # BLD AUTO: 2.64 THOUSANDS/ΜL (ref 1.85–7.62)
NEUTS SEG NFR BLD AUTO: 62 % (ref 43–75)
NITRITE UR QL STRIP: NEGATIVE
NON-SQ EPI CELLS URNS QL MICRO: NORMAL /HPF
NRBC BLD AUTO-RTO: 0 /100 WBCS
PH UR STRIP.AUTO: 7 [PH]
PLATELET # BLD AUTO: 265 THOUSANDS/UL (ref 149–390)
PMV BLD AUTO: 10.8 FL (ref 8.9–12.7)
POTASSIUM SERPL-SCNC: 4.6 MMOL/L (ref 3.5–5.3)
PROT SERPL-MCNC: 7.9 G/DL (ref 6.4–8.4)
PROT UR STRIP-MCNC: NEGATIVE MG/DL
RBC # BLD AUTO: 4.26 MILLION/UL (ref 3.81–5.12)
RBC #/AREA URNS AUTO: NORMAL /HPF
SODIUM SERPL-SCNC: 138 MMOL/L (ref 135–147)
SP GR UR STRIP.AUTO: 1.01 (ref 1–1.03)
UROBILINOGEN UR STRIP-ACNC: <2 MG/DL
WBC # BLD AUTO: 4.29 THOUSAND/UL (ref 4.31–10.16)
WBC #/AREA URNS AUTO: NORMAL /HPF

## 2022-08-19 PROCEDURE — 36415 COLL VENOUS BLD VENIPUNCTURE: CPT

## 2022-08-19 PROCEDURE — 85025 COMPLETE CBC W/AUTO DIFF WBC: CPT

## 2022-08-19 PROCEDURE — 80053 COMPREHEN METABOLIC PANEL: CPT

## 2022-09-01 ENCOUNTER — OFFICE VISIT (OUTPATIENT)
Dept: FAMILY MEDICINE CLINIC | Facility: CLINIC | Age: 52
End: 2022-09-01
Payer: COMMERCIAL

## 2022-09-01 VITALS
SYSTOLIC BLOOD PRESSURE: 102 MMHG | HEIGHT: 67 IN | DIASTOLIC BLOOD PRESSURE: 76 MMHG | OXYGEN SATURATION: 96 % | WEIGHT: 221.2 LBS | BODY MASS INDEX: 34.72 KG/M2 | RESPIRATION RATE: 16 BRPM | TEMPERATURE: 97.5 F | HEART RATE: 68 BPM

## 2022-09-01 DIAGNOSIS — E78.00 PURE HYPERCHOLESTEROLEMIA: Primary | ICD-10-CM

## 2022-09-01 DIAGNOSIS — I36.1 NONRHEUMATIC TRICUSPID VALVE REGURGITATION: ICD-10-CM

## 2022-09-01 DIAGNOSIS — R94.31 ABNORMAL EKG: ICD-10-CM

## 2022-09-01 DIAGNOSIS — N28.9 ABNORMAL RENAL FUNCTION: ICD-10-CM

## 2022-09-01 DIAGNOSIS — D75.89 INCREASED MCV: ICD-10-CM

## 2022-09-01 DIAGNOSIS — R74.8 HIGH GAMMA GLUTAMYL TRANSFERASE (GGT): ICD-10-CM

## 2022-09-01 DIAGNOSIS — E66.9 OBESITY (BMI 35.0-39.9 WITHOUT COMORBIDITY): ICD-10-CM

## 2022-09-01 DIAGNOSIS — D72.819 LEUKOPENIA, UNSPECIFIED TYPE: ICD-10-CM

## 2022-09-01 DIAGNOSIS — R74.8 ELEVATED ALKALINE PHOSPHATASE LEVEL: ICD-10-CM

## 2022-09-01 DIAGNOSIS — I34.0 NONRHEUMATIC MITRAL VALVE REGURGITATION: ICD-10-CM

## 2022-09-01 PROCEDURE — 99214 OFFICE O/P EST MOD 30 MIN: CPT | Performed by: FAMILY MEDICINE

## 2022-09-01 NOTE — PROGRESS NOTES
Assessment/Plan:       No problem-specific Assessment & Plan notes found for this encounter  Diagnoses and all orders for this visit:    Pure hypercholesterolemia  Comments: To follow to follow with low-fat    Abnormal renal function  Comments: To increase fluid intake  Orders:  -     Basic metabolic panel; Future    Increased MCV  Comments:  corrected    Abnormal EKG  Comments:  echo is ok    High gamma glutamyl transferase (GGT)  Comments:  to follow with GI    Elevated alkaline phosphatase level  Comments:  to follow with  GI    Nonrheumatic mitral valve regurgitation  Comments:  Trace mitral regurgitation    Nonrheumatic tricuspid valve regurgitation  Comments:  recheck echo-doppler in 2 years    Leukopenia, unspecified type  -     CBC and differential; Future    Obesity (BMI 35 0-39 9 without comorbidity)  Comments:  advise to lose weight ,to follow with  heathy diet   recommend referral to weight mangement   pt declined        Patient Instructions   Follow up with test results      Orders Placed This Encounter   Procedures    Basic metabolic panel     This is a patient instruction: Patient fasting for 8 hours or longer recommended  Standing Status:   Future     Standing Expiration Date:   9/1/2023    CBC and differential     This is a patient instruction: This test is non-fasting  Please drink two glasses of water morning of bloodwork  Standing Status:   Future     Standing Expiration Date:   9/1/2023         Subjective:     Patient ID: Gladys Lewis is a 46 y o  female      HPI  Follow-up chronic medical problems  Hyperlipidemia  Admit not to healthy diet she is depending mostly on junk food and not eating enough vegetable and fruit  Elevated ALKP  Did not follow with GI  Elevated MCV    She does not drink alcohol  Abnormal EKG, denied chest pain  Recent renal function is abnormal   Patient stated she does sweat a lot at work and does not hydrate herself well no prblems with urination    Test results  Echo Doppler  Lab done on August 1922  Discussed with  Pt  Review of Systems   Constitutional: Negative for activity change, appetite change, chills, fatigue, fever and unexpected weight change  HENT: Negative for congestion, ear discharge, ear pain, hearing loss, nosebleeds, rhinorrhea, sinus pressure, sore throat, tinnitus, trouble swallowing and voice change  Eyes: Negative for photophobia, pain and visual disturbance  Respiratory: Negative for cough, chest tightness, shortness of breath and wheezing  Cardiovascular: Negative for chest pain, palpitations and leg swelling  Gastrointestinal: Negative for abdominal pain, anal bleeding, blood in stool, constipation, diarrhea, nausea and vomiting  Endocrine: Negative for cold intolerance, heat intolerance, polydipsia and polyuria  Genitourinary: Negative for difficulty urinating, dysuria, flank pain, frequency, hematuria, pelvic pain, urgency, vaginal bleeding and vaginal discharge  Musculoskeletal: Negative for arthralgias, back pain, gait problem, joint swelling, myalgias and neck pain  Skin: Negative for rash  Neurological: Negative for dizziness, tremors, seizures, syncope, weakness, light-headedness and headaches  Hematological: Negative for adenopathy  Does not bruise/bleed easily  Psychiatric/Behavioral: Negative for agitation, behavioral problems, confusion, dysphoric mood, hallucinations and sleep disturbance  The patient is not nervous/anxious  Objective:     Physical Exam  Constitutional:       General: She is not in acute distress  Appearance: She is well-developed  HENT:      Head: Normocephalic and atraumatic  Right Ear: External ear normal       Left Ear: External ear normal       Nose: Nose normal       Mouth/Throat:      Pharynx: No oropharyngeal exudate  Eyes:      General: No scleral icterus  Right eye: No discharge  Left eye: No discharge  Conjunctiva/sclera: Conjunctivae normal       Pupils: Pupils are equal, round, and reactive to light  Neck:      Thyroid: No thyromegaly  Vascular: No JVD  Cardiovascular:      Rate and Rhythm: Normal rate and regular rhythm  Heart sounds: Normal heart sounds  No murmur heard  Pulmonary:      Effort: Pulmonary effort is normal       Breath sounds: Normal breath sounds  Abdominal:      General: Bowel sounds are normal       Palpations: Abdomen is soft  There is no mass  Tenderness: There is no abdominal tenderness  There is no guarding or rebound  Hernia: No hernia is present  Musculoskeletal:      Cervical back: Neck supple  Lymphadenopathy:      Cervical: No cervical adenopathy  Skin:     Findings: No rash  Neurological:      Mental Status: She is alert and oriented to person, place, and time  Cranial Nerves: No cranial nerve deficit  Motor: No abnormal muscle tone  Coordination: Coordination normal    Psychiatric:         Behavior: Behavior normal          Judgment: Judgment normal      BMI Counseling: Body mass index is 35 17 kg/m²  The BMI is above normal  Nutrition recommendations include decreasing portion sizes, encouraging healthy choices of fruits and vegetables, decreasing fast food intake, consuming healthier snacks and limiting drinks that contain sugar  Exercise recommendations include moderate physical activity 150 minutes/week  No pharmacotherapy was ordered  Patient referred to PCP  Rationale for BMI follow-up plan is due to patient being overweight or obese

## 2022-09-13 ENCOUNTER — TELEMEDICINE (OUTPATIENT)
Dept: FAMILY MEDICINE CLINIC | Facility: CLINIC | Age: 52
End: 2022-09-13
Payer: COMMERCIAL

## 2022-09-13 VITALS — TEMPERATURE: 99 F

## 2022-09-13 DIAGNOSIS — R05.9 COUGH: ICD-10-CM

## 2022-09-13 DIAGNOSIS — R19.7 DIARRHEA, UNSPECIFIED TYPE: ICD-10-CM

## 2022-09-13 DIAGNOSIS — J02.9 SORE THROAT: Primary | ICD-10-CM

## 2022-09-13 LAB — S PYO AG THROAT QL: NEGATIVE

## 2022-09-13 PROCEDURE — 99213 OFFICE O/P EST LOW 20 MIN: CPT | Performed by: FAMILY MEDICINE

## 2022-09-13 PROCEDURE — 87880 STREP A ASSAY W/OPTIC: CPT | Performed by: FAMILY MEDICINE

## 2022-09-13 NOTE — PROGRESS NOTES
COVID-19 Outpatient Progress Note    Assessment/Plan:    Problem List Items Addressed This Visit    None     Visit Diagnoses     Sore throat    -  Primary    Relevant Orders    POCT rapid strepA (Completed)    Cough        Diarrhea, unspecified type             Disposition:      A advised patient to rest    Await  COVID  PCR results done at the pharmacy  Patient to call if positive   advised patient to call if her symptoms not better or worse    I have spent 12 minutes directly with the patient  Encounter provider: Humberto Erwin MD     Provider located at: [address] [place] [address]  Via [address] [address] [phone]     Recent Visits  Date Type Provider Dept   09/14/22 Telephone [facility] [address] [facility]   09/13/22 Telemedicine Humberto Erwin MD [facility] Primary Care   Showing recent visits within past 7 days and meeting all other requirements  Future Appointments  No visits were found meeting these conditions  Showing future appointments within next 150 days and meeting all other requirements     This virtual check-in was done via [employer] [address] and patient was informed that this is a secure, HIPAA-compliant platform  She agrees to proceed  Patient agrees to participate in a virtual check in via telephone or video visit instead of presenting to the office to address urgent/immediate medical needs  Patient is aware this is a billable service  She acknowledged consent and understanding of privacy and security of the video platform  The patient has agreed to participate and understands they can discontinue the visit at any time  After connecting through [facility], the patient was identified by name and date of birth  Tashi Silva was informed that this was a telemedicine visit and that the exam was being conducted confidentially over secure lines  My office door was closed  No one else was in the room   Tashi Silva acknowledged consent and understanding of privacy and security of the telemedicine visit  I informed the patient that I have reviewed her record in Epic and presented the opportunity for her to ask any questions regarding the visit today  The patient agreed to participate  Verification of patient location:  Patient is located in the following state in which I hold an active license: [place]    Subjective:   Hamzah Lozano is a 46 y o  female who is concerned about COVID-19  Patient's symptoms include chills, fatigue, nasal congestion, sore throat, cough, nausea, diarrhea, myalgias and headache   Patient denies fever, malaise, rhinorrhea, anosmia, loss of taste, shortness of breath, chest tightness, abdominal pain and vomiting      - Date of symptom onset: 9/10/2022      COVID-19 vaccination status: Fully vaccinated with booster    Exposure:   Contact with a person who is under investigation (PUI) for or who is positive for COVID-19 within the last 14 days?: No    Hospitalized recently for fever and/or lower respiratory symptoms?: No      Currently a healthcare worker that is involved in direct patient care?: No      Works in a special setting where the risk of COVID-19 transmission may be high? (this may include long-term care, correctional and intermediate facilities; homeless shelters; assisted-living facilities and group homes ): No      Resident in a special setting where the risk of COVID-19 transmission may be high? (this may include long-term care, correctional and intermediate facilities; homeless shelters; assisted-living facilities and group homes ): No      Had mild diarrhea x 2 days  On Friday and sat , resolved  Sore throat , very mild   had chills on sat and Sundy, resolved   her cough is dry and mild    taste is different   overall her symptoms are mild to moderate and are improving  Home covid test on Sunday was neg     had Covid PCR at the pharmacy yesterday , result still pending    Lab Results   Component Value Date    SARSCOV2 Negative 05/13/2022       Review of Systems   Constitutional: Positive for chills and fatigue  Negative for activity change, appetite change, diaphoresis and fever  HENT: Positive for congestion and sore throat  Negative for ear pain, rhinorrhea, trouble swallowing and voice change  Eyes: Negative for pain, discharge, itching and visual disturbance  Respiratory: Positive for cough  Negative for chest tightness and shortness of breath  Cardiovascular: Negative for chest pain, palpitations and leg swelling  Gastrointestinal: Positive for diarrhea and nausea  Negative for abdominal pain, blood in stool, constipation and vomiting  Endocrine: Negative for polydipsia and polyuria  Genitourinary: Negative for dysuria, flank pain, frequency, hematuria, pelvic pain, urgency, vaginal bleeding and vaginal discharge  Musculoskeletal: Positive for myalgias  Negative for arthralgias, back pain, gait problem and neck pain  Skin: Negative for rash  Allergic/Immunologic: Negative for food allergies  Neurological: Positive for headaches  Negative for dizziness, tremors, seizures, syncope, speech difficulty, weakness, light-headedness and numbness  Hematological: Negative for adenopathy  Does not bruise/bleed easily  Psychiatric/Behavioral: Negative for confusion and dysphoric mood  The patient is not nervous/anxious        Current Outpatient Medications on File Prior to Visit   Medication Sig    cholecalciferol (VITAMIN D3) 1,000 units tablet Take 2,000 Units by mouth daily    Coenzyme Q10 (COQ-10) 200 MG CAPS Take 1 capsule by mouth daily    ibuprofen (MOTRIN) 200 mg tablet Take 400 mg by mouth every 6 (six) hours as needed for mild pain    patient supplied medication Visiclear Capsules  Taking 2 capsule daily on an empty stomach to help eyesight    Probiotic Product (PROBIOTIC PO) Take 1 capsule by mouth daily    Propylene Glycol (SYSTANE BALANCE OP) Apply 1 drop to eye as needed    tetrahydrozoline (VISINE) 0 05 % ophthalmic solution Administer 1 drop to both eyes 4 (four) times a day as needed for irritation    loratadine (CLARITIN) 10 mg tablet Take 10 mg by mouth in the morning  (Patient not taking: No sig reported)    simethicone (MYLICON) 40 EW/7 3 mL drops Take 40 mg by mouth 4 (four) times a day as needed for flatulence (Patient not taking: No sig reported)       Objective:    Temp 99 °F (37 2 °C)   [facility] 10/02/2017      Physical Exam  Constitutional:       General: She is not in acute distress  Appearance: She is well-developed  HENT:      Head: Normocephalic  Nose:      Comments: Pt is coated with white coat  Eyes:      General: No scleral icterus  Conjunctiva/sclera: Conjunctivae normal    Neck:      Vascular: No JVD  Cardiovascular:      Comments: Extremities  No edema  Pulmonary:      Effort: Pulmonary effort is normal  No respiratory distress  Skin:     Coloration: Skin is not pale  Neurological:      Mental Status: She is alert and oriented to person, place, and time  Cranial Nerves: No cranial nerve deficit  Motor: No abnormal muscle tone        Coordination: Coordination normal    Psychiatric:         Mood and Affect: Mood normal          Behavior: Behavior normal          Judgment: Judgment normal        Ivon sEpana MD

## 2022-09-14 ENCOUNTER — TELEPHONE (OUTPATIENT)
Dept: FAMILY MEDICINE CLINIC | Facility: CLINIC | Age: 52
End: 2022-09-14

## 2022-09-14 NOTE — TELEPHONE ENCOUNTER
Spoke to patient  Patient stated she has a feeling much better   Fever or chills  He felt her tongue was numb but also got better  Advised patient since her symptoms are improving to rest drink enough warm liquids and to call if any further symptoms

## 2022-09-14 NOTE — TELEPHONE ENCOUNTER
Patient called to inform you that her covid pcr test that she had done at the [facility] clinic was negative  She still has a headache and a little bit of cramping but better than yesterday, and she still has her tongue numb  She said if you are going to send any medication for her to send it to the [facility] please  I did advise patient that she needs to upload the results to my chart in case she needs a note to go back to work

## 2022-10-11 PROBLEM — Z12.12 SCREENING FOR COLORECTAL CANCER: Status: RESOLVED | Noted: 2022-07-15 | Resolved: 2022-10-11

## 2022-10-11 PROBLEM — Z12.11 SCREENING FOR COLORECTAL CANCER: Status: RESOLVED | Noted: 2022-07-15 | Resolved: 2022-10-11

## 2022-10-27 ENCOUNTER — OFFICE VISIT (OUTPATIENT)
Dept: FAMILY MEDICINE CLINIC | Facility: CLINIC | Age: 52
End: 2022-10-27
Payer: COMMERCIAL

## 2022-10-27 VITALS
OXYGEN SATURATION: 98 % | BODY MASS INDEX: 35.17 KG/M2 | TEMPERATURE: 96.5 F | SYSTOLIC BLOOD PRESSURE: 129 MMHG | DIASTOLIC BLOOD PRESSURE: 85 MMHG | HEART RATE: 88 BPM | HEIGHT: 67 IN

## 2022-10-27 DIAGNOSIS — J06.9 URI WITH COUGH AND CONGESTION: ICD-10-CM

## 2022-10-27 DIAGNOSIS — R05.9 COUGH, UNSPECIFIED TYPE: ICD-10-CM

## 2022-10-27 DIAGNOSIS — K52.9 GASTROENTERITIS: ICD-10-CM

## 2022-10-27 DIAGNOSIS — U07.1 COVID-19 VIRUS INFECTION: Primary | ICD-10-CM

## 2022-10-27 PROBLEM — E55.9 VITAMIN D DEFICIENCY: Status: RESOLVED | Noted: 2019-01-25 | Resolved: 2022-10-27

## 2022-10-27 PROBLEM — E87.5 HYPERKALEMIA: Status: RESOLVED | Noted: 2019-01-25 | Resolved: 2022-10-27

## 2022-10-27 PROBLEM — R76.8 ANA POSITIVE: Status: RESOLVED | Noted: 2019-01-25 | Resolved: 2022-10-27

## 2022-10-27 LAB
SARS-COV-2 AG UPPER RESP QL IA: POSITIVE
VALID CONTROL: ABNORMAL

## 2022-10-27 PROCEDURE — 99214 OFFICE O/P EST MOD 30 MIN: CPT | Performed by: FAMILY MEDICINE

## 2022-10-27 PROCEDURE — 87811 SARS-COV-2 COVID19 W/OPTIC: CPT | Performed by: FAMILY MEDICINE

## 2022-10-27 RX ORDER — BENZONATATE 200 MG/1
200 CAPSULE ORAL 3 TIMES DAILY PRN
Qty: 20 CAPSULE | Refills: 0 | Status: SHIPPED | OUTPATIENT
Start: 2022-10-27

## 2022-10-27 NOTE — PATIENT INSTRUCTIONS
Recommend honey, gargle salt water, hot shower, vicks- also giving you tessalon tablets  You can use Delsym and throat lozenges  Call for any concerns 
Activating Events/Stressors

## 2022-10-27 NOTE — PROGRESS NOTES
Subjective:    Cough  This is a new problem  The current episode started in the past 7 days  The problem has been gradually improving  The problem occurs hourly  The cough is productive of sputum  Associated symptoms include nasal congestion and wheezing  Pertinent negatives include no chest pain, chills, ear congestion, ear pain, fever, headaches, heartburn, hemoptysis, myalgias, postnasal drip, rash, rhinorrhea, sore throat, shortness of breath, sweats or weight loss  Nothing aggravates the symptoms  Tank Garcia is a 46 y o  female here today for:  Chief Complaint   Patient presents with   • COVID-19     Positive 7 days ago   Has cough, congestion     ---Above per clinical staff & reviewed  ---  HPI:  49yof here with upper respiratory complaints    10/16 returned from cruise, had GI symptoms with vomiting (after eating [employer]); 2 days later started with runny nose cough; then progress to low grade fever 100 2 Tmax  Pt still has cough and wheezing, now 10 days later  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  Discussed that test could still be positive- needs it for home health  Will give tessalon- also recommended honey, throat gargles, vicks, delsym  Due for well exam in December- will have her schedule     Results for orders placed or performed in visit on 10/27/22   POCT Rapid Covid Ag   Result Value Ref Range    POCT SARS-CoV-2 Ag Positive (A) Negative    VALID CONTROL Valid          The following portions of the patient's history were reviewed and updated as appropriate: allergies, current medications, past family history, past medical history, past social history, past surgical history and problem list     Past Medical History:   Diagnosis Date   • Allergic        Past Surgical History:   Procedure Laterality Date   • APPENDECTOMY     • CERVICAL POLYPECTOMY     • WISDOM TOOTH EXTRACTION         Social History     Socioeconomic History   • Marital status:      Spouse name: None   • Number of children: None   • Years of education: None   • Highest education level: None   Occupational History   • None   Tobacco Use   • Smoking status: Former Smoker     Types: Cigarettes   • Smokeless tobacco: Never Used   Vaping Use   • Vaping Use: Never used   Substance and Sexual Activity   • Alcohol use: No   • Drug use: No   • Sexual activity: Not Currently     Partners: Male   Other Topics Concern   • None   Social History Narrative   • None     Social Determinants of Health     Financial Resource Strain: Not on file   Food Insecurity: Not on file   Transportation Needs: Not on file   Physical Activity: Not on file   Stress: Not on file   Social Connections: Not on file   Intimate Partner Violence: Not on file   Housing Stability: Not on file       Current Outpatient Medications   Medication Sig Dispense Refill   • benzonatate (TESSALON) 200 MG capsule Take 1 capsule (200 mg total) by mouth 3 (three) times a day as needed for cough 20 capsule 0   • cholecalciferol (VITAMIN D3) 1,000 units tablet Take 2,000 Units by mouth daily     • Coenzyme Q10 (COQ-10) 200 MG CAPS Take 1 capsule by mouth daily     • ibuprofen (MOTRIN) 200 mg tablet Take 400 mg by mouth every 6 (six) hours as needed for mild pain     • loratadine (CLARITIN) 10 mg tablet Take 10 mg by mouth daily     • patient supplied medication Visiclear Capsules  Taking 2 capsule daily on an empty stomach to help eyesight     • Probiotic Product (PROBIOTIC PO) Take 1 capsule by mouth daily     • Propylene Glycol (SYSTANE BALANCE OP) Apply 1 drop to eye as needed     • simethicone (MYLICON) 40 ER/3 7 mL drops Take 40 mg by mouth 4 (four) times a day as needed for flatulence     • tetrahydrozoline (VISINE) 0 05 % ophthalmic solution Administer 1 drop to both eyes 4 (four) times a day as needed for irritation       No current facility-administered medications for this visit  Review of Systems   Constitutional: Negative    Negative for chills, fever and weight loss    HENT: Negative  Negative for ear pain, postnasal drip, rhinorrhea and sore throat  Eyes: Negative for pain and visual disturbance  Respiratory: Positive for cough and wheezing  Negative for hemoptysis and shortness of breath  Cardiovascular: Negative  Negative for chest pain and palpitations  Gastrointestinal: Negative  Negative for abdominal pain, heartburn and vomiting  Genitourinary: Negative  Negative for dysuria and hematuria  Musculoskeletal: Negative for arthralgias, back pain and myalgias  Skin: Negative for color change and rash  Neurological: Negative  Negative for seizures, syncope and headaches  Psychiatric/Behavioral: Negative  Objective:    /85 (BP Location: Left arm, Patient Position: Sitting, Cuff Size: Standard)   Pulse 88   Temp (!) 96 5 °F (35 8 °C) (Temporal)   Ht 5' 6 5" (1 689 m)   LMP 10/02/2017   SpO2 98%   BMI 35 17 kg/m²   Wt Readings from Last 3 Encounters:   09/01/22 100 kg (221 lb 3 2 oz)   08/11/22 101 kg (223 lb)   07/15/22 101 kg (223 lb)     BP Readings from Last 3 Encounters:   10/27/22 129/85   09/01/22 102/76   08/11/22 124/80       Lab Results   Component Value Date    WBC 4 29 (L) 08/19/2022    HGB 13 2 08/19/2022    HCT 41 8 08/19/2022     08/19/2022    TRIG 98 05/06/2022    HDL 73 05/06/2022    ALT 42 08/19/2022    AST 26 08/19/2022    K 4 6 08/19/2022     08/19/2022    CREATININE 0 86 08/19/2022    BUN 14 08/19/2022    CO2 27 08/19/2022    GLUF 99 08/19/2022    HGBA1C 5 5 05/06/2022       Physical Exam  Vitals and nursing note reviewed  Constitutional:       Appearance: Normal appearance  She is well-developed  HENT:      Head: Normocephalic and atraumatic  Right Ear: Tympanic membrane normal       Left Ear: Tympanic membrane normal       Nose: Congestion present  Mouth/Throat:      Mouth: Mucous membranes are moist    Eyes:      Pupils: Pupils are equal, round, and reactive to light  Cardiovascular:      Rate and Rhythm: Normal rate and regular rhythm  Heart sounds: No murmur heard  Pulmonary:      Effort: Pulmonary effort is normal       Breath sounds: Normal breath sounds  Musculoskeletal:      Cervical back: Normal range of motion and neck supple  Skin:     General: Skin is warm  Capillary Refill: Capillary refill takes less than 2 seconds  Neurological:      Mental Status: She is alert and oriented to person, place, and time  Cranial Nerves: No cranial nerve deficit  Psychiatric:         Mood and Affect: Mood normal          Thought Content: Thought content normal                          Assessment/Plan:   Juan Francisco Montes De Oca was seen today for covid-19  Diagnoses and all orders for this visit:    COVID-19 virus infection  -     benzonatate (TESSALON) 200 MG capsule; Take 1 capsule (200 mg total) by mouth 3 (three) times a day as needed for cough  -     POCT Rapid Covid Ag    Cough, unspecified type  -     benzonatate (TESSALON) 200 MG capsule; Take 1 capsule (200 mg total) by mouth 3 (three) times a day as needed for cough  -     POCT Rapid Covid Ag    URI with cough and congestion    Gastroenteritis      Return in about 7 weeks (around 12/14/2022) for Annual physical   Patient Instructions   Recommend honey, gargle salt water, hot shower, vicks- also giving you tessalon tablets  You can use Delsym and throat lozenges  Call for any concerns

## 2022-12-13 ENCOUNTER — LAB (OUTPATIENT)
Dept: LAB | Age: 52
End: 2022-12-13

## 2022-12-13 DIAGNOSIS — D72.819 LEUKOPENIA, UNSPECIFIED TYPE: ICD-10-CM

## 2022-12-13 DIAGNOSIS — N28.9 ABNORMAL RENAL FUNCTION: ICD-10-CM

## 2022-12-13 LAB
ANION GAP SERPL CALCULATED.3IONS-SCNC: 7 MMOL/L (ref 4–13)
BASOPHILS # BLD AUTO: 0.04 THOUSANDS/ÂΜL (ref 0–0.1)
BASOPHILS NFR BLD AUTO: 1 % (ref 0–1)
BUN SERPL-MCNC: 12 MG/DL (ref 5–25)
CALCIUM SERPL-MCNC: 9.4 MG/DL (ref 8.3–10.1)
CHLORIDE SERPL-SCNC: 107 MMOL/L (ref 96–108)
CO2 SERPL-SCNC: 26 MMOL/L (ref 21–32)
CREAT SERPL-MCNC: 0.8 MG/DL (ref 0.6–1.3)
EOSINOPHIL # BLD AUTO: 0.08 THOUSAND/ÂΜL (ref 0–0.61)
EOSINOPHIL NFR BLD AUTO: 2 % (ref 0–6)
ERYTHROCYTE [DISTWIDTH] IN BLOOD BY AUTOMATED COUNT: 13.6 % (ref 11.6–15.1)
GFR SERPL CREATININE-BSD FRML MDRD: 85 ML/MIN/1.73SQ M
GLUCOSE P FAST SERPL-MCNC: 100 MG/DL (ref 65–99)
HCT VFR BLD AUTO: 40.8 % (ref 34.8–46.1)
HGB BLD-MCNC: 12.9 G/DL (ref 11.5–15.4)
IMM GRANULOCYTES # BLD AUTO: 0.01 THOUSAND/UL (ref 0–0.2)
IMM GRANULOCYTES NFR BLD AUTO: 0 % (ref 0–2)
LYMPHOCYTES # BLD AUTO: 0.94 THOUSANDS/ÂΜL (ref 0.6–4.47)
LYMPHOCYTES NFR BLD AUTO: 27 % (ref 14–44)
MCH RBC QN AUTO: 31.6 PG (ref 26.8–34.3)
MCHC RBC AUTO-ENTMCNC: 31.6 G/DL (ref 31.4–37.4)
MCV RBC AUTO: 100 FL (ref 82–98)
MONOCYTES # BLD AUTO: 0.28 THOUSAND/ÂΜL (ref 0.17–1.22)
MONOCYTES NFR BLD AUTO: 8 % (ref 4–12)
NEUTROPHILS # BLD AUTO: 2.16 THOUSANDS/ÂΜL (ref 1.85–7.62)
NEUTS SEG NFR BLD AUTO: 62 % (ref 43–75)
NRBC BLD AUTO-RTO: 0 /100 WBCS
PLATELET # BLD AUTO: 267 THOUSANDS/UL (ref 149–390)
PMV BLD AUTO: 10.9 FL (ref 8.9–12.7)
POTASSIUM SERPL-SCNC: 4.5 MMOL/L (ref 3.5–5.3)
RBC # BLD AUTO: 4.08 MILLION/UL (ref 3.81–5.12)
SODIUM SERPL-SCNC: 140 MMOL/L (ref 135–147)
WBC # BLD AUTO: 3.51 THOUSAND/UL (ref 4.31–10.16)

## 2022-12-15 ENCOUNTER — APPOINTMENT (OUTPATIENT)
Dept: LAB | Facility: CLINIC | Age: 52
End: 2022-12-15

## 2022-12-15 ENCOUNTER — OFFICE VISIT (OUTPATIENT)
Dept: FAMILY MEDICINE CLINIC | Facility: CLINIC | Age: 52
End: 2022-12-15

## 2022-12-15 VITALS
HEIGHT: 66 IN | HEART RATE: 80 BPM | TEMPERATURE: 97.2 F | BODY MASS INDEX: 34.68 KG/M2 | SYSTOLIC BLOOD PRESSURE: 110 MMHG | DIASTOLIC BLOOD PRESSURE: 80 MMHG | WEIGHT: 215.8 LBS | OXYGEN SATURATION: 98 %

## 2022-12-15 DIAGNOSIS — I10 HYPERTENSION, UNSPECIFIED TYPE: ICD-10-CM

## 2022-12-15 DIAGNOSIS — Z00.00 ANNUAL PHYSICAL EXAM: ICD-10-CM

## 2022-12-15 DIAGNOSIS — E66.9 OBESITY (BMI 30-39.9): ICD-10-CM

## 2022-12-15 DIAGNOSIS — E78.2 MIXED HYPERLIPIDEMIA: ICD-10-CM

## 2022-12-15 DIAGNOSIS — Z13.6 SCREENING FOR CARDIOVASCULAR CONDITION: ICD-10-CM

## 2022-12-15 DIAGNOSIS — Z00.00 ANNUAL PHYSICAL EXAM: Primary | ICD-10-CM

## 2022-12-15 LAB
CHOLEST SERPL-MCNC: 244 MG/DL
HDLC SERPL-MCNC: 73 MG/DL
LDLC SERPL CALC-MCNC: 149 MG/DL (ref 0–100)
NONHDLC SERPL-MCNC: 171 MG/DL
TRIGL SERPL-MCNC: 112 MG/DL
TSH SERPL DL<=0.05 MIU/L-ACNC: 0.74 UIU/ML (ref 0.45–4.5)

## 2022-12-15 NOTE — PROGRESS NOTES
Subjective:    HPI  Tori Gonzalez is a 46 y o  female here today for:  Chief Complaint   Patient presents with   • Physical Exam          ---Above per clinical staff & reviewed   ---  HPI:  49yof here for well exam  [place] Folds well since COVID infection  Was sick after she recovered from [place], son has flu  Doing ok now  Had CBC with slightly low white count- will follow   Has appt for mammogram  Colon cancer screening and PAP up to date  Discussed healthy eating    PHQ-2/9 Depression Screening    Little interest or pleasure in doing things: 0 - not at all  Feeling down, depressed, or hopeless: 0 - not at all  PHQ-2 Score: 0  PHQ-2 Interpretation: Negative depression screen              The following portions of the patient's history were reviewed and updated as appropriate: allergies, current medications, past family history, past medical history, past social history, past surgical history and problem list     Past Medical History:   Diagnosis Date   • Allergic        Past Surgical History:   Procedure Laterality Date   • APPENDECTOMY     • CERVICAL POLYPECTOMY     • WISDOM TOOTH EXTRACTION         Social History     Socioeconomic History   • Marital status:      Spouse name: None   • Number of children: None   • Years of education: None   • Highest education level: None   Occupational History   • None   Tobacco Use   • Smoking status: Former     Types: Cigarettes   • Smokeless tobacco: Never   Vaping Use   • Vaping Use: Never used   Substance and Sexual Activity   • Alcohol use: No   • Drug use: No   • Sexual activity: Not Currently     Partners: Male   Other Topics Concern   • None   Social History Narrative   • None     Social Determinants of Health     Financial Resource Strain: Not on file   Food Insecurity: Not on file   Transportation Needs: Not on file   Physical Activity: Not on file   Stress: Not on file   Social Connections: Not on file   Intimate Partner Violence: Not on file   Housing Stability: Not on file Current Outpatient Medications   Medication Sig Dispense Refill   • cholecalciferol (VITAMIN D3) 1,000 units tablet Take 2,000 Units by mouth daily     • Coenzyme Q10 (COQ-10) 200 MG CAPS Take 1 capsule by mouth daily     • loratadine (CLARITIN) 10 mg tablet Take 10 mg by mouth daily     • patient supplied medication Visiclear Capsules  Taking 2 capsule daily on an empty stomach to help eyesight     • Probiotic Product (PROBIOTIC PO) Take 1 capsule by mouth daily     • Propylene Glycol (SYSTANE BALANCE OP) Apply 1 drop to eye as needed     • tetrahydrozoline (VISINE) 0 05 % ophthalmic solution Administer 1 drop to both eyes 4 (four) times a day as needed for irritation     • ibuprofen (MOTRIN) 200 mg tablet Take 400 mg by mouth every 6 (six) hours as needed for mild pain (Patient not taking: Reported on 12/15/2022)       No current facility-administered medications for this visit  Review of Systems   Constitutional: Negative  Negative for chills and fever  HENT: Negative  Negative for ear pain and sore throat  Eyes: Negative for pain and visual disturbance  Respiratory: Negative  Negative for cough and shortness of breath  Cardiovascular: Negative  Negative for chest pain and palpitations  Gastrointestinal: Negative  Negative for abdominal pain and vomiting  Genitourinary: Negative  Negative for dysuria and hematuria  Musculoskeletal: Negative for arthralgias and back pain  Skin: Negative for color change and rash  Neurological: Negative  Negative for seizures and syncope  Psychiatric/Behavioral: Negative           Objective:    /80 (BP Location: Left arm, Patient Position: Sitting, Cuff Size: Large)   Pulse 80   Temp (!) 97 2 °F (36 2 °C) (Temporal)   Ht 5' 6" (1 676 m)   Wt 97 9 kg (215 lb 12 8 oz)   LMP 10/02/2017   SpO2 98%   BMI 34 83 kg/m²   Wt Readings from Last 3 Encounters:   12/15/22 97 9 kg (215 lb 12 8 oz)   09/01/22 100 kg (221 lb 3 2 oz)   08/11/22 101 kg (223 lb)     BP Readings from Last 3 Encounters:   12/15/22 110/80   10/27/22 129/85   09/01/22 102/76       Lab Results   Component Value Date    WBC 3 51 (L) 12/13/2022    HGB 12 9 12/13/2022    HCT 40 8 12/13/2022     12/13/2022    TRIG 98 05/06/2022    HDL 73 05/06/2022    ALT 42 08/19/2022    AST 26 08/19/2022    K 4 5 12/13/2022     12/13/2022    CREATININE 0 80 12/13/2022    BUN 12 12/13/2022    CO2 26 12/13/2022    GLUF 100 (H) 12/13/2022    HGBA1C 5 5 05/06/2022       Physical Exam  Vitals and nursing note reviewed  Constitutional:       Appearance: Normal appearance  She is well-developed  HENT:      Head: Normocephalic and atraumatic  Right Ear: Tympanic membrane and external ear normal       Left Ear: Tympanic membrane and external ear normal       Nose: Nose normal       Mouth/Throat:      Mouth: Mucous membranes are moist    Eyes:      Conjunctiva/sclera: Conjunctivae normal       Pupils: Pupils are equal, round, and reactive to light  Neck:      Thyroid: No thyromegaly  Comments: No carotid bruits  Cardiovascular:      Rate and Rhythm: Normal rate and regular rhythm  Heart sounds: Normal heart sounds  No murmur heard  Pulmonary:      Effort: Pulmonary effort is normal  No respiratory distress  Breath sounds: Normal breath sounds  Abdominal:      General: Bowel sounds are normal  There is no distension  Palpations: Abdomen is soft  Tenderness: There is no abdominal tenderness  Musculoskeletal:         General: Normal range of motion  Cervical back: Normal range of motion and neck supple  Skin:     General: Skin is warm  Capillary Refill: Capillary refill takes less than 2 seconds  Neurological:      Mental Status: She is alert and oriented to person, place, and time  Cranial Nerves: No cranial nerve deficit  Psychiatric:         Mood and Affect: Mood normal          Thought Content:  Thought content normal  Depression Screening and Follow-up Plan: Patient was screened for depression during today's encounter  They screened negative with a PHQ-2 score of 0  Assessment/Plan:   Min Norman was seen today for physical exam     Diagnoses and all orders for this visit:    Annual physical exam  -     Lipid panel; Future  -     TSH, 3rd generation with Free T4 reflex; Future    Screening for cardiovascular condition  -     Lipid panel; Future  -     TSH, 3rd generation with Free T4 reflex; Future    Mixed hyperlipidemia  -     Lipid panel; Future  -     TSH, 3rd generation with Free T4 reflex; Future    Hypertension, unspecified type  -     Lipid panel; Future  -     TSH, 3rd generation with Free T4 reflex; Future    Obesity (BMI 30-39 9)  -     Lipid panel; Future  -     TSH, 3rd generation with Free T4 reflex; Future      Return in 6 months (on 6/15/2023) for Recheck  Patient Instructions     Please get your labwork done fasting  Do not eat/drink for about 8-12 hours prior to getting the labwork done, however you may drink water or black coffee while you are fasting  Please use a [facility] lab if possible, as we receive the lab results more quickly  We will notify you of your labwork results even if they are normal   Please contact us if you do not hear about your lab results after one week  Low Carb Recommendations:  Please follow a low carbohydrate, high protein diet  [email or web address] is a good [place]mar/computer program to keep food logs  Your meals should be less than 30 grams of carbohydrates  Your snacks should be less than 15 grams of carbohydrates  You should drink at least 100 ounces of water daily  You should walk at least 30 minutes daily  Follow up in 6 months  Have a happy and healthy holiday  Wellness Visit for Adults   AMBULATORY CARE:   A wellness visit  is when you see your healthcare provider to get screened for health problems   Your healthcare provider will also give you advice on how to stay healthy  Write down your questions so you remember to ask them  Ask your healthcare provider how often you should have a wellness visit  What happens at a wellness visit:  Your healthcare provider will ask about your health, and your family history of health problems  This includes high blood pressure, heart disease, and cancer  He or she will ask if you have symptoms that concern you, if you smoke, and about your mood  You may also be asked about your intake of medicines, supplements, food, and alcohol  Any of the following may be done:  · Your weight  will be checked  Your height may also be checked so your body mass index (BMI) can be calculated  Your BMI shows if you are at a healthy weight  · Your blood pressure  and heart rate will be checked  Your temperature may also be checked  · Blood and urine tests  may be done  Blood tests may be done to check your cholesterol levels  Abnormal cholesterol levels increase your risk for heart disease and stroke  You may also need a blood or urine test to check for diabetes if you are at increased risk  Urine tests may be done to look for signs of an infection or kidney disease  · A physical exam  includes checking your heartbeat and lungs with a stethoscope  Your healthcare provider may also check your skin to look for sun damage  · Screening tests  may be recommended  A screening test is done to check for diseases that may not cause symptoms  The screening tests you may need depend on your age, gender, family history, and lifestyle habits  For example, colorectal screening may be recommended if you are 48years old or older  Screening tests you need if you are a woman:   · A Pap smear  is used to screen for cervical cancer  Pap smears are usually done every 3 to 5 years depending on your age   You may need them more often if you have had abnormal Pap smear test results in the past  Ask your healthcare provider how often you should have a Pap smear     · A mammogram  is an x-ray of your breasts to screen for breast cancer  Experts recommend mammograms every 2 years starting at age 48 years  You may need a mammogram at age 52 years or younger if you have an increased risk for breast cancer  Talk to your healthcare provider about when you should start having mammograms and how often you need them  Vaccines you may need:   · Get an influenza vaccine  every year  The influenza vaccine protects you from the flu  Several types of viruses cause the flu  The viruses change over time, so new vaccines are made each year  · Get a tetanus-diphtheria (Td) booster vaccine  every 10 years  This vaccine protects you against tetanus and diphtheria  Tetanus is a severe infection that may cause painful muscle spasms and lockjaw  Diphtheria is a severe bacterial infection that causes a thick covering in the back of your mouth and throat  · Get a human papillomavirus (HPV) vaccine  if you are female and aged 23 to 32 or male 23 to 24 and never received it  This vaccine protects you from HPV infection  HPV is the most common infection spread by sexual contact  HPV may also cause vaginal, penile, and anal cancers  · Get a pneumococcal vaccine  if you are aged 72 years or older  The pneumococcal vaccine is an injection given to protect you from pneumococcal disease  Pneumococcal disease is an infection caused by pneumococcal bacteria  The infection may cause pneumonia, meningitis, or an ear infection  · Get a shingles vaccine  if you are 60 or older, even if you have had shingles before  The shingles vaccine is an injection to protect you from the varicella-zoster virus  This is the same virus that causes chickenpox  Shingles is a painful rash that develops in people who had chickenpox or have been exposed to the virus  How to eat healthy:  My Plate is a model for planning healthy meals  It shows the types and amounts of foods that should go on your plate  Fruits and vegetables make up about half of your plate, and grains and protein make up the other half  A serving of dairy is included on the side of your plate  The amount of calories and serving sizes you need depends on your age, gender, weight, and height  Examples of healthy foods are listed below:  · Eat a variety of vegetables  such as dark green, red, and orange vegetables  You can also include canned vegetables low in sodium (salt) and frozen vegetables without added butter or sauces  · Eat a variety of fresh fruits , canned fruit in 100% juice, frozen fruit, and dried fruit  · Include whole grains  At least half of the grains you eat should be whole grains  Examples include whole-wheat bread, wheat pasta, brown rice, and whole-grain cereals such as oatmeal     · Eat a variety of protein foods such as seafood (fish and shellfish), lean meat, and poultry without skin (turkey and chicken)  Examples of lean meats include pork leg, shoulder, or tenderloin, and beef round, sirloin, tenderloin, and extra lean ground beef  Other protein foods include eggs and egg substitutes, beans, peas, soy products, nuts, and seeds  · Choose low-fat dairy products such as skim or 1% milk or low-fat yogurt, cheese, and cottage cheese  · Limit unhealthy fats  such as butter, hard margarine, and shortening  Exercise:  Exercise at least 30 minutes per day on most days of the week  Some examples of exercise include walking, biking, dancing, and swimming  You can also fit in more physical activity by taking the stairs instead of the elevator or parking farther away from stores  Include muscle strengthening activities 2 days each week  Regular exercise provides many health benefits  It helps you manage your weight, and decreases your risk for type 2 diabetes, heart disease, stroke, and high blood pressure  Exercise can also help improve your mood  Ask your healthcare provider about the best exercise plan for you  General health and safety guidelines:   · Do not smoke  Nicotine and other chemicals in cigarettes and cigars can cause lung damage  Ask your healthcare provider for information if you currently smoke and need help to quit  E-cigarettes or smokeless tobacco still contain nicotine  Talk to your healthcare provider before you use these products  · Limit alcohol  A drink of alcohol is 12 ounces of beer, 5 ounces of wine, or 1½ ounces of liquor  · Lose weight, if needed  Being overweight increases your risk of certain health conditions  These include heart disease, high blood pressure, type 2 diabetes, and certain types of cancer  · Protect your skin  Do not sunbathe or use tanning beds  Use sunscreen with a SPF 15 or higher  Apply sunscreen at least 15 minutes before you go outside  Reapply sunscreen every 2 hours  Wear protective clothing, hats, and sunglasses when you are outside  · Drive safely  Always wear your seatbelt  Make sure everyone in your car wears a seatbelt  A seatbelt can save your life if you are in an accident  Do not use your cell phone when you are driving  This could distract you and cause an accident  Pull over if you need to make a call or send a text message  · Practice safe sex  Use latex condoms if are sexually active and have more than one partner  Your healthcare provider may recommend screening tests for sexually transmitted infections (STIs)  · Wear helmets, lifejackets, and protective gear  Always wear a helmet when you ride a bike or motorcycle, go skiing, or play sports that could cause a head injury  Wear protective equipment when you play sports  Wear a lifejacket when you are on a boat or doing water sports  © Copyright [employer] 2022 Information is for End User's use only and may not be sold, redistributed or otherwise used for commercial purposes   All illustrations and images included in CareNotes® are the copyrighted property of [employer]  or [employer]  The above information is an  only  It is not intended as medical advice for individual conditions or treatments  Talk to your doctor, nurse or pharmacist before following any medical regimen to see if it is safe and effective for you

## 2022-12-15 NOTE — PATIENT INSTRUCTIONS
Please get your labwork done fasting  Do not eat/drink for about 8-12 hours prior to getting the labwork done, however you may drink water or black coffee while you are fasting  Please use a [facility] lab if possible, as we receive the lab results more quickly  We will notify you of your labwork results even if they are normal   Please contact us if you do not hear about your lab results after one week  Low Carb Recommendations:  Please follow a low carbohydrate, high protein diet  [employer] is a good delmar/computer program to keep food logs  Your meals should be less than 30 grams of carbohydrates  Your snacks should be less than 15 grams of carbohydrates  You should drink at least 100 ounces of water daily  You should walk at least 30 minutes daily  Follow up in 6 months  Have a happy and healthy holiday  Wellness Visit for Adults   AMBULATORY CARE:   A wellness visit  is when you see your healthcare provider to get screened for health problems  Your healthcare provider will also give you advice on how to stay healthy  Write down your questions so you remember to ask them  Ask your healthcare provider how often you should have a wellness visit  What happens at a wellness visit:  Your healthcare provider will ask about your health, and your family history of health problems  This includes high blood pressure, heart disease, and cancer  He or she will ask if you have symptoms that concern you, if you smoke, and about your mood  You may also be asked about your intake of medicines, supplements, food, and alcohol  Any of the following may be done: Your weight  will be checked  Your height may also be checked so your body mass index (BMI) can be calculated  Your BMI shows if you are at a healthy weight  Your blood pressure  and heart rate will be checked  Your temperature may also be checked  Blood and urine tests  may be done   Blood tests may be done to check your cholesterol levels  Abnormal cholesterol levels increase your risk for heart disease and stroke  You may also need a blood or urine test to check for diabetes if you are at increased risk  Urine tests may be done to look for signs of an infection or kidney disease  A physical exam  includes checking your heartbeat and lungs with a stethoscope  Your healthcare provider may also check your skin to look for sun damage  Screening tests  may be recommended  A screening test is done to check for diseases that may not cause symptoms  The screening tests you may need depend on your age, gender, family history, and lifestyle habits  For example, colorectal screening may be recommended if you are 48years old or older  Screening tests you need if you are a woman:   A Pap smear  is used to screen for cervical cancer  Pap smears are usually done every 3 to 5 years depending on your age  You may need them more often if you have had abnormal Pap smear test results in the past  Ask your healthcare provider how often you should have a Pap smear  A mammogram  is an x-ray of your breasts to screen for breast cancer  Experts recommend mammograms every 2 years starting at age 48 years  You may need a mammogram at age 52 years or younger if you have an increased risk for breast cancer  Talk to your healthcare provider about when you should start having mammograms and how often you need them  Vaccines you may need:   Get an influenza vaccine  every year  The influenza vaccine protects you from the flu  Several types of viruses cause the flu  The viruses change over time, so new vaccines are made each year  Get a tetanus-diphtheria (Td) booster vaccine  every 10 years  This vaccine protects you against tetanus and diphtheria  Tetanus is a severe infection that may cause painful muscle spasms and lockjaw  Diphtheria is a severe bacterial infection that causes a thick covering in the back of your mouth and throat      Get a human papillomavirus (HPV) vaccine  if you are female and aged 23 to 32 or male 23 to 24 and never received it  This vaccine protects you from HPV infection  HPV is the most common infection spread by sexual contact  HPV may also cause vaginal, penile, and anal cancers  Get a pneumococcal vaccine  if you are aged 72 years or older  The pneumococcal vaccine is an injection given to protect you from pneumococcal disease  Pneumococcal disease is an infection caused by pneumococcal bacteria  The infection may cause pneumonia, meningitis, or an ear infection  Get a shingles vaccine  if you are 60 or older, even if you have had shingles before  The shingles vaccine is an injection to protect you from the varicella-zoster virus  This is the same virus that causes chickenpox  Shingles is a painful rash that develops in people who had chickenpox or have been exposed to the virus  How to eat healthy:  My Plate is a model for planning healthy meals  It shows the types and amounts of foods that should go on your plate  Fruits and vegetables make up about half of your plate, and grains and protein make up the other half  A serving of dairy is included on the side of your plate  The amount of calories and serving sizes you need depends on your age, gender, weight, and height  Examples of healthy foods are listed below:  Eat a variety of vegetables  such as dark green, red, and orange vegetables  You can also include canned vegetables low in sodium (salt) and frozen vegetables without added butter or sauces  Eat a variety of fresh fruits , canned fruit in 100% juice, frozen fruit, and dried fruit  Include whole grains  At least half of the grains you eat should be whole grains  Examples include whole-wheat bread, wheat pasta, brown rice, and whole-grain cereals such as oatmeal     Eat a variety of protein foods such as seafood (fish and shellfish), lean meat, and poultry without skin (turkey and chicken)   Examples of lean meats include pork leg, shoulder, or tenderloin, and beef round, sirloin, tenderloin, and extra lean ground beef  Other protein foods include eggs and egg substitutes, beans, peas, soy products, nuts, and seeds  Choose low-fat dairy products such as skim or 1% milk or low-fat yogurt, cheese, and cottage cheese  Limit unhealthy fats  such as butter, hard margarine, and shortening  Exercise:  Exercise at least 30 minutes per day on most days of the week  Some examples of exercise include walking, biking, dancing, and swimming  You can also fit in more physical activity by taking the stairs instead of the elevator or parking farther away from stores  Include muscle strengthening activities 2 days each week  Regular exercise provides many health benefits  It helps you manage your weight, and decreases your risk for type 2 diabetes, heart disease, stroke, and high blood pressure  Exercise can also help improve your mood  Ask your healthcare provider about the best exercise plan for you  General health and safety guidelines:   Do not smoke  Nicotine and other chemicals in cigarettes and cigars can cause lung damage  Ask your healthcare provider for information if you currently smoke and need help to quit  E-cigarettes or smokeless tobacco still contain nicotine  Talk to your healthcare provider before you use these products  Limit alcohol  A drink of alcohol is 12 ounces of beer, 5 ounces of wine, or 1½ ounces of liquor  Lose weight, if needed  Being overweight increases your risk of certain health conditions  These include heart disease, high blood pressure, type 2 diabetes, and certain types of cancer  Protect your skin  Do not sunbathe or use tanning beds  Use sunscreen with a SPF 15 or higher  Apply sunscreen at least 15 minutes before you go outside  Reapply sunscreen every 2 hours  Wear protective clothing, hats, and sunglasses when you are outside  Drive safely    Always wear your seatbelt  Make sure everyone in your car wears a seatbelt  A seatbelt can save your life if you are in an accident  Do not use your cell phone when you are driving  This could distract you and cause an accident  Pull over if you need to make a call or send a text message  Practice safe sex  Use latex condoms if are sexually active and have more than one partner  Your healthcare provider may recommend screening tests for sexually transmitted infections (STIs)  Wear helmets, lifejackets, and protective gear  Always wear a helmet when you ride a bike or motorcycle, go skiing, or play sports that could cause a head injury  Wear protective equipment when you play sports  Wear a lifejacket when you are on a boat or doing water sports  © Copyright [employer] 2022 Information is for End User's use only and may not be sold, redistributed or otherwise used for commercial purposes  All illustrations and images included in CareNotes® are the copyrighted property of A [employer] A M [employer]  or [facility] Andres Gillis   The above information is an  only  It is not intended as medical advice for individual conditions or treatments  Talk to your doctor, nurse or pharmacist before following any medical regimen to see if it is safe and effective for you

## 2023-01-30 ENCOUNTER — HOSPITAL ENCOUNTER (OUTPATIENT)
Dept: MAMMOGRAPHY | Facility: CLINIC | Age: 53
Discharge: HOME | End: 2023-01-30

## 2023-01-30 DIAGNOSIS — Z12.31 ENCOUNTER FOR SCREENING MAMMOGRAM FOR BREAST CANCER: ICD-10-CM

## 2023-06-05 ENCOUNTER — APPOINTMENT (OUTPATIENT)
Dept: LAB | Facility: HOSPITAL | Age: 53
End: 2023-06-05

## 2023-06-05 DIAGNOSIS — Z00.8 HEALTH EXAMINATION IN POPULATION SURVEY: ICD-10-CM

## 2023-06-05 LAB
CHOLEST SERPL-MCNC: 220 MG/DL
EST. AVERAGE GLUCOSE BLD GHB EST-MCNC: 108 MG/DL
HBA1C MFR BLD: 5.4 %
HDLC SERPL-MCNC: 66 MG/DL
LDLC SERPL CALC-MCNC: 133 MG/DL (ref 0–100)
NONHDLC SERPL-MCNC: 154 MG/DL
TRIGL SERPL-MCNC: 106 MG/DL

## 2023-06-05 PROCEDURE — 36415 COLL VENOUS BLD VENIPUNCTURE: CPT

## 2023-06-05 PROCEDURE — 80061 LIPID PANEL: CPT

## 2023-06-05 PROCEDURE — 83036 HEMOGLOBIN GLYCOSYLATED A1C: CPT

## 2023-06-22 ENCOUNTER — OFFICE VISIT (OUTPATIENT)
Dept: FAMILY MEDICINE CLINIC | Facility: CLINIC | Age: 53
End: 2023-06-22
Payer: COMMERCIAL

## 2023-06-22 VITALS
TEMPERATURE: 97.3 F | HEART RATE: 80 BPM | OXYGEN SATURATION: 98 % | BODY MASS INDEX: 35.52 KG/M2 | WEIGHT: 221 LBS | DIASTOLIC BLOOD PRESSURE: 90 MMHG | HEIGHT: 66 IN | SYSTOLIC BLOOD PRESSURE: 120 MMHG

## 2023-06-22 DIAGNOSIS — E78.2 MIXED HYPERLIPIDEMIA: ICD-10-CM

## 2023-06-22 DIAGNOSIS — E66.9 OBESITY (BMI 30-39.9): Primary | ICD-10-CM

## 2023-06-22 PROCEDURE — 99213 OFFICE O/P EST LOW 20 MIN: CPT | Performed by: FAMILY MEDICINE

## 2023-06-22 NOTE — PATIENT INSTRUCTIONS
Low Carb Recommendations:  Please follow a low carbohydrate, high protein diet  [id number] is a good del[place]/computer program to keep food logs  Your meals should be less than 30 grams of carbohydrates  Your snacks should be less than 15 grams of carbohydrates  You should drink at least 100 ounces of water daily  You should walk at least 30 minutes daily

## 2023-06-22 NOTE — PROGRESS NOTES
Subjective:    HPI  Valery Mcnamara is a 46 y o  female here today for:  Chief Complaint   Patient presents with   • Follow-up          ---Above per clinical staff & reviewed   ---  HPI:  49yof here for follow up  Trouble losing weight  Sleep schedule off because of working nights  Bought supplement she saw on TikTok for weight loss  Discussed how these are not studied and can't recommend  Discussed diet and exercise  Pt eating lots of carbs- discussed increasing protein and bodies reaction to carbs and sugar  Labs done  Well exam due in December     The following portions of the patient's history were reviewed and updated as appropriate: allergies, current medications, past family history, past medical history, past social history, past surgical history and problem list     Past Medical History:   Diagnosis Date   • Allergic        Past Surgical History:   Procedure Laterality Date   • APPENDECTOMY     • CERVICAL POLYPECTOMY     • WISDOM TOOTH EXTRACTION         Social History     Socioeconomic History   • Marital status:      Spouse name: None   • Number of children: None   • Years of education: None   • Highest education level: None   Occupational History   • None   Tobacco Use   • Smoking status: Former     Types: Cigarettes   • Smokeless tobacco: Never   Vaping Use   • Vaping Use: Never used   Substance and Sexual Activity   • Alcohol use: No   • Drug use: No   • Sexual activity: Not Currently     Partners: Male   Other Topics Concern   • None   Social History Narrative   • None     Social Determinants of Health     Financial Resource Strain: Not on file   Food Insecurity: Not on file   Transportation Needs: Not on file   Physical Activity: Not on file   Stress: Not on file   Social Connections: Not on file   Intimate Partner Violence: Not on file   Housing Stability: Not on file       Current Outpatient Medications   Medication Sig Dispense Refill   • cholecalciferol (VITAMIN D3) 1,000 units tablet Take 2,000 "Units by mouth daily     • Coenzyme Q10 (COQ-10) 200 MG CAPS Take 1 capsule by mouth daily     • Probiotic Product (PROBIOTIC PO) Take 1 capsule by mouth daily     • Propylene Glycol (SYSTANE BALANCE OP) Apply 1 drop to eye as needed     • tetrahydrozoline (VISINE) 0 05 % ophthalmic solution Administer 1 drop to both eyes 4 (four) times a day as needed for irritation       No current facility-administered medications for this visit  Review of Systems   Constitutional: Negative  Negative for chills and fever  HENT: Negative  Negative for ear pain and sore throat  Eyes: Negative for pain and visual disturbance  Respiratory: Negative  Negative for cough and shortness of breath  Cardiovascular: Negative  Negative for chest pain and palpitations  Gastrointestinal: Negative  Negative for abdominal pain and vomiting  Genitourinary: Negative  Negative for dysuria and hematuria  Musculoskeletal: Negative for arthralgias and back pain  Skin: Negative for color change and rash  Neurological: Negative  Negative for seizures and syncope  Psychiatric/Behavioral: Negative           Objective:    /90 (BP Location: Left arm, Patient Position: Sitting, Cuff Size: Large)   Pulse 80   Temp (!) 97 3 °F (36 3 °C) (Temporal)   Ht 5' 6\" (1 676 m)   Wt 100 kg (221 lb)   LMP 10/02/2017   SpO2 98%   BMI 35 67 kg/m²   Wt Readings from Last 3 Encounters:   06/22/23 100 kg (221 lb)   12/15/22 97 9 kg (215 lb 12 8 oz)   09/01/22 100 kg (221 lb 3 2 oz)     BP Readings from Last 3 Encounters:   06/22/23 120/90   12/15/22 110/80   10/27/22 129/85       Lab Results   Component Value Date    WBC 3 51 (L) 12/13/2022    HGB 12 9 12/13/2022    HCT 40 8 12/13/2022     12/13/2022    TRIG 106 06/05/2023    HDL 66 06/05/2023    ALT 42 08/19/2022    AST 26 08/19/2022    K 4 5 12/13/2022     12/13/2022    CREATININE 0 80 12/13/2022    BUN 12 12/13/2022    CO2 26 12/13/2022    GLUF 100 (H) 12/13/2022 " HGBA1C 5 4 06/05/2023       Physical Exam  Vitals and nursing note reviewed  Constitutional:       Appearance: Normal appearance  She is well-developed  HENT:      Head: Normocephalic and atraumatic  Eyes:      Pupils: Pupils are equal, round, and reactive to light  Cardiovascular:      Rate and Rhythm: Normal rate and regular rhythm  Heart sounds: No murmur heard  Pulmonary:      Effort: Pulmonary effort is normal       Breath sounds: Normal breath sounds  Musculoskeletal:      Cervical back: Normal range of motion and neck supple  Skin:     General: Skin is warm  Capillary Refill: Capillary refill takes less than 2 seconds  Neurological:      Mental Status: She is alert and oriented to person, place, and time  Cranial Nerves: No cranial nerve deficit  Psychiatric:         Mood and Affect: Mood normal          Thought Content: Thought content normal                BMI Counseling: Body mass index is 35 67 kg/m²  The BMI is above normal  Nutrition recommendations include moderation in carbohydrate intake and increasing intake of lean protein  Exercise recommendations include moderate physical activity 150 minutes/week  No pharmacotherapy was ordered  Patient referred to PCP  Rationale for BMI follow-up plan is due to patient being overweight or obese  Assessment/Plan:   Db Fung was seen today for follow-up  Diagnoses and all orders for this visit:    Obesity (BMI 30-39  9)    Mixed hyperlipidemia      Return in about 6 months (around 12/18/2023) for Annual physical   Patient Instructions   Low Carb Recommendations:  Please follow a low carbohydrate, high protein diet  [employer] is a good delmar/computer program to keep food logs  Your meals should be less than 30 grams of carbohydrates  Your snacks should be less than 15 grams of carbohydrates  You should drink at least 100 ounces of water daily  You should walk at least 30 minutes daily

## 2023-07-28 ENCOUNTER — ANNUAL EXAM (OUTPATIENT)
Dept: OBGYN CLINIC | Facility: CLINIC | Age: 53
End: 2023-07-28
Payer: COMMERCIAL

## 2023-07-28 VITALS
HEART RATE: 87 BPM | WEIGHT: 219 LBS | DIASTOLIC BLOOD PRESSURE: 88 MMHG | SYSTOLIC BLOOD PRESSURE: 124 MMHG | BODY MASS INDEX: 35.2 KG/M2 | HEIGHT: 66 IN

## 2023-07-28 DIAGNOSIS — Z12.31 ENCOUNTER FOR SCREENING MAMMOGRAM FOR BREAST CANCER: ICD-10-CM

## 2023-07-28 DIAGNOSIS — Z01.419 ENCNTR FOR GYN EXAM (GENERAL) (ROUTINE) W/O ABN FINDINGS: Primary | ICD-10-CM

## 2023-07-28 DIAGNOSIS — Z78.0 ASYMPTOMATIC MENOPAUSAL STATE: ICD-10-CM

## 2023-07-28 PROCEDURE — S0612 ANNUAL GYNECOLOGICAL EXAMINA: HCPCS | Performed by: OBSTETRICS & GYNECOLOGY

## 2023-07-28 NOTE — PATIENT INSTRUCTIONS
Wellness Visit for Adults   AMBULATORY CARE:   A wellness visit  is when you see your healthcare provider to get screened for health problems. Your healthcare provider will also give you advice on how to stay healthy. Write down your questions so you remember to ask them. Ask your healthcare provider how often you should have a wellness visit. What happens at a wellness visit:  Your healthcare provider will ask about your health, and your family history of health problems. This includes high blood pressure, heart disease, and cancer. He or she will ask if you have symptoms that concern you, if you smoke, and about your mood. You may also be asked about your intake of medicines, supplements, food, and alcohol. Any of the following may be done: Your weight  will be checked. Your height may also be checked so your body mass index (BMI) can be calculated. Your BMI shows if you are at a healthy weight. Your blood pressure  and heart rate will be checked. Your temperature may also be checked. Blood and urine tests  may be done. Blood tests may be done to check your cholesterol levels. Abnormal cholesterol levels increase your risk for heart disease and stroke. You may also need a blood or urine test to check for diabetes if you are at increased risk. Urine tests may be done to look for signs of an infection or kidney disease. A physical exam  includes checking your heartbeat and lungs with a stethoscope. Your healthcare provider may also check your skin to look for sun damage. Screening tests  may be recommended. A screening test is done to check for diseases that may not cause symptoms. The screening tests you may need depend on your age, gender, family history, and lifestyle habits. For example, colorectal screening may be recommended if you are 48years old or older. Screening tests you need if you are a woman:   A Pap smear  is used to screen for cervical cancer.  Pap smears are usually done every 3 to 5 years depending on your age. You may need them more often if you have had abnormal Pap smear test results in the past. Ask your healthcare provider how often you should have a Pap smear. A mammogram  is an x-ray of your breasts to screen for breast cancer. Experts recommend mammograms every 2 years starting at age 48 years. You may need a mammogram at age 52 years or younger if you have an increased risk for breast cancer. Talk to your healthcare provider about when you should start having mammograms and how often you need them. Vaccines you may need:   Get an influenza vaccine  every year. The influenza vaccine protects you from the flu. Several types of viruses cause the flu. The viruses change over time, so new vaccines are made each year. Get a tetanus-diphtheria (Td) booster vaccine  every 10 years. This vaccine protects you against tetanus and diphtheria. Tetanus is a severe infection that may cause painful muscle spasms and lockjaw. Diphtheria is a severe bacterial infection that causes a thick covering in the back of your mouth and throat. Get a human papillomavirus (HPV) vaccine  if you are female and aged 23 to 32 or male 23 to 24 and never received it. This vaccine protects you from HPV infection. HPV is the most common infection spread by sexual contact. HPV may also cause vaginal, penile, and anal cancers. Get a pneumococcal vaccine  if you are aged 72 years or older. The pneumococcal vaccine is an injection given to protect you from pneumococcal disease. Pneumococcal disease is an infection caused by pneumococcal bacteria. The infection may cause pneumonia, meningitis, or an ear infection. Get a shingles vaccine  if you are 60 or older, even if you have had shingles before. The shingles vaccine is an injection to protect you from the varicella-zoster virus. This is the same virus that causes chickenpox.  Shingles is a painful rash that develops in people who had chickenpox or have been exposed to the virus. How to eat healthy:  My Plate is a model for planning healthy meals. It shows the types and amounts of foods that should go on your plate. Fruits and vegetables make up about half of your plate, and grains and protein make up the other half. A serving of dairy is included on the side of your plate. The amount of calories and serving sizes you need depends on your age, gender, weight, and height. Examples of healthy foods are listed below:  Eat a variety of vegetables  such as dark green, red, and orange vegetables. You can also include canned vegetables low in sodium (salt) and frozen vegetables without added butter or sauces. Eat a variety of fresh fruits , canned fruit in 100% juice, frozen fruit, and dried fruit. Include whole grains. At least half of the grains you eat should be whole grains. Examples include whole-wheat bread, wheat pasta, brown rice, and whole-grain cereals such as oatmeal.    Eat a variety of protein foods such as seafood (fish and shellfish), lean meat, and poultry without skin (turkey and chicken). Examples of lean meats include pork leg, shoulder, or tenderloin, and beef round, sirloin, tenderloin, and extra lean ground beef. Other protein foods include eggs and egg substitutes, beans, peas, soy products, nuts, and seeds. Choose low-fat dairy products such as skim or 1% milk or low-fat yogurt, cheese, and cottage cheese. Limit unhealthy fats  such as butter, hard margarine, and shortening. Exercise:  Exercise at least 30 minutes per day on most days of the week. Some examples of exercise include walking, biking, dancing, and swimming. You can also fit in more physical activity by taking the stairs instead of the elevator or parking farther away from stores. Include muscle strengthening activities 2 days each week. Regular exercise provides many health benefits.  It helps you manage your weight, and decreases your risk for type 2 diabetes, heart disease, stroke, and high blood pressure. Exercise can also help improve your mood. Ask your healthcare provider about the best exercise plan for you. General health and safety guidelines:   Do not smoke. Nicotine and other chemicals in cigarettes and cigars can cause lung damage. Ask your healthcare provider for information if you currently smoke and need help to quit. E-cigarettes or smokeless tobacco still contain nicotine. Talk to your healthcare provider before you use these products. Limit alcohol. A drink of alcohol is 12 ounces of beer, 5 ounces of wine, or 1½ ounces of liquor. Lose weight, if needed. Being overweight increases your risk of certain health conditions. These include heart disease, high blood pressure, type 2 diabetes, and certain types of cancer. Protect your skin. Do not sunbathe or use tanning beds. Use sunscreen with a SPF 15 or higher. Apply sunscreen at least 15 minutes before you go outside. Reapply sunscreen every 2 hours. Wear protective clothing, hats, and sunglasses when you are outside. Drive safely. Always wear your seatbelt. Make sure everyone in your car wears a seatbelt. A seatbelt can save your life if you are in an accident. Do not use your cell phone when you are driving. This could distract you and cause an accident. Pull over if you need to make a call or send a text message. Practice safe sex. Use latex condoms if are sexually active and have more than one partner. Your healthcare provider may recommend screening tests for sexually transmitted infections (STIs). Wear helmets, lifejackets, and protective gear. Always wear a helmet when you ride a bike or motorcycle, go skiing, or play sports that could cause a head injury. Wear protective equipment when you play sports. Wear a lifejacket when you are on a boat or doing water sports.     © Copyright [employer] 2022 Information is for End User's use only and may not be sold, redistributed or otherwise used for commercial purposes. The above information is an  only. It is not intended as medical advice for individual conditions or treatments. Talk to your doctor, nurse or pharmacist before following any medical regimen to see if it is safe and effective for you.

## 2023-07-28 NOTE — PROGRESS NOTES
Assessment        Diagnoses and all orders for this visit:    Encntr for gyn exam (general) (routine) w/o abn findings    Encounter for screening mammogram for breast cancer  -     Mammo screening bilateral w 3d & cad; Future    Asymptomatic menopausal state             Plan      All questions answered. Contraception: post menopausal status. Discussed healthy lifestyle modifications. Educational material distributed. Follow up in 1 year. Follow up as needed. Mammogram.   Pap DUE IN   Mild vaginal atrophy - asymptomatic     Subjective      Kamille Milton is a 48 y.o. female who presents for annual exam.      Chief Complaint   Patient presents with   • Gynecologic Exam     Yearly       She has no periods  She is not sexually active    Last Pap: 21 NILM  Last mammogram: 23  Colorectal cancer screenin22 COLOGUARD negative      Current contraception: post menopausal status/abstinence  History of abnormal Pap smear: yes - HPV  History of abnormal mammogram: no  Family history of uterine or ovarian cancer: no  Family history of breast cancer: no  Family history of colon cancer: no       OB History    Para Term  AB Living   3 3 3 0 0 3   SAB IAB Ectopic Multiple Live Births   0 0 0 0 3      # Outcome Date GA Lbr Wagner/2nd Weight Sex Delivery Anes PTL Lv   3 Term      Vag-Spont      2 Term      Vag-Spont      1 Term      Vag-Spont          Menstrual History:  OB History        3    Para   3    Term   3       0    AB        Living   3       SAB   0    IAB   0    Ectopic   0    Multiple   0    Live Births   1                Menarche age: 15  Patient's last menstrual period was 10/02/2017.              Past Medical History:   Diagnosis Date   • Allergic      Past Surgical History:   Procedure Laterality Date   • APPENDECTOMY     • CERVICAL POLYPECTOMY     • WISDOM TOOTH EXTRACTION       Family History   Problem Relation Age of Onset   • Hypertension Family    • Heart disease Family    • Diabetes type II Family    • Hypertension Mother    • Heart disease Mother    • Heart attack Father    • Kidney disease Sister    • Hypertension Sister        Social History     Tobacco Use   • Smoking status: Former     Types: Cigarettes   • Smokeless tobacco: Never   Vaping Use   • Vaping Use: Never used   Substance Use Topics   • Alcohol use: No   • Drug use: No          Current Outpatient Medications:   •  cholecalciferol (VITAMIN D3) 1,000 units tablet, Take 2,000 Units by mouth daily, Disp: , Rfl:   •  Coenzyme Q10 (COQ-10) 200 MG CAPS, Take 1 capsule by mouth daily, Disp: , Rfl:   •  Probiotic Product (PROBIOTIC PO), Take 1 capsule by mouth daily, Disp: , Rfl:   •  Propylene Glycol (SYSTANE BALANCE OP), Apply 1 drop to eye as needed, Disp: , Rfl:   •  tetrahydrozoline (VISINE) 0.05 % ophthalmic solution, Administer 1 drop to both eyes 4 (four) times a day as needed for irritation, Disp: , Rfl:     Allergies   Allergen Reactions   • Nicotine Cough, Headache, Sneezing and Nasal Congestion     smoke           Review of Systems   Constitutional: Negative. HENT: Negative. Eyes: Negative. Respiratory: Negative. Cardiovascular: Negative. Gastrointestinal: Negative. Endocrine: Negative. Genitourinary:        As noted in HPI   Musculoskeletal: Negative. Skin: Negative. Allergic/Immunologic: Negative. Neurological: Negative. Hematological: Negative. Psychiatric/Behavioral: Negative. /88 (BP Location: Right arm, Patient Position: Sitting, Cuff Size: Large)   Pulse 87   Ht 5' 6" (1.676 m)   Wt 99.3 kg (219 lb)   LMP 10/02/2017   BMI 35.35 kg/m²         Physical Exam  Constitutional:       Appearance: She is well-developed. Genitourinary:      Vulva, bladder and rectum normal.      No lesions in the vagina.       Genitourinary Comments:         Right Labia: No rash, tenderness, lesions, skin changes or Bartholin's cyst.     Left Labia: No tenderness, lesions, skin changes, Bartholin's cyst or rash. No inguinal adenopathy present in the right or left side. No vaginal discharge, tenderness or bleeding. No vaginal prolapse present. Mild vaginal atrophy present. Right Adnexa: not tender, not full and no mass present. Left Adnexa: not tender, not full and no mass present. No cervical motion tenderness, friability, lesion or polyp. Uterus is not enlarged or tender. Pelvic exam was performed with patient in the lithotomy position. Rectum:      No external hemorrhoid. Breasts:     Right: No mass, nipple discharge, skin change or tenderness. Left: No mass, nipple discharge, skin change or tenderness. HENT:      Head: Normocephalic. Nose: Nose normal.   Eyes:      Conjunctiva/sclera: Conjunctivae normal.   Neck:      Thyroid: No thyromegaly. Cardiovascular:      Rate and Rhythm: Normal rate and regular rhythm. Heart sounds: Normal heart sounds. No murmur heard. Pulmonary:      Effort: Pulmonary effort is normal. No respiratory distress. Breath sounds: Normal breath sounds. No wheezing or rales. Abdominal:      General: There is no distension. Palpations: Abdomen is soft. There is no mass. Tenderness: There is no abdominal tenderness. There is no guarding or rebound. Musculoskeletal:         General: No tenderness. Cervical back: Neck supple. No muscular tenderness. Lymphadenopathy:      Cervical: No cervical adenopathy. Lower Body: No right inguinal adenopathy. No left inguinal adenopathy. Neurological:      Mental Status: She is alert and oriented to person, place, and time. Skin:     General: Skin is warm and dry.    Psychiatric:         Mood and Affect: Mood normal.         Behavior: Behavior normal.             Future Appointments   Date Time Provider [address] [place] [address]   12/21/2023  8:20 AM Saji Vargas MD [facility]

## 2023-11-03 ENCOUNTER — OFFICE VISIT (OUTPATIENT)
Dept: URGENT CARE | Age: 53
End: 2023-11-03
Payer: COMMERCIAL

## 2023-11-03 VITALS
SYSTOLIC BLOOD PRESSURE: 153 MMHG | HEART RATE: 98 BPM | OXYGEN SATURATION: 95 % | RESPIRATION RATE: 18 BRPM | DIASTOLIC BLOOD PRESSURE: 97 MMHG | TEMPERATURE: 97.8 F

## 2023-11-03 DIAGNOSIS — J01.00 ACUTE NON-RECURRENT MAXILLARY SINUSITIS: Primary | ICD-10-CM

## 2023-11-03 PROCEDURE — 99213 OFFICE O/P EST LOW 20 MIN: CPT

## 2023-11-03 RX ORDER — BENZONATATE 200 MG/1
200 CAPSULE ORAL 3 TIMES DAILY PRN
Qty: 20 CAPSULE | Refills: 0 | Status: SHIPPED | OUTPATIENT
Start: 2023-11-03

## 2023-11-03 RX ORDER — AZITHROMYCIN 250 MG/1
TABLET, FILM COATED ORAL
Qty: 6 TABLET | Refills: 0 | Status: SHIPPED | OUTPATIENT
Start: 2023-11-03 | End: 2023-11-07

## 2023-11-03 NOTE — PROGRESS NOTES
[facility]        NAME: Ricco Hawthorne is a 48 y.o. female  : 1970    MRN: [id number]  DATE: November 3, 2023  TIME: 2:55 PM    Assessment and Plan   Acute non-recurrent maxillary sinusitis [J01.00]  1. Acute non-recurrent maxillary sinusitis  benzonatate (TESSALON) 200 MG capsule    azithromycin (ZITHROMAX) 250 mg tablet      Pt presents for c/o cough, congestion, right side sinus tenderness. Attempted OTC meds. Home covid negative. Patient Instructions       Follow up with PCP as needed    Chief Complaint     Chief Complaint   Patient presents with    Cold Like Symptoms     Pt started sx Monday with scratchy throat. Tuesday felt congestion. Used Sudafex and mucinex. Headache, cough, runny nose. When coughing pt feels pins and needles in chest, when blows nose ears burn. Pt had fever last night, achy as well. Took covid test yesterday was negative. History of Present Illness       Pt presents for c/o cough, congestion, right side sinus tenderness. Attempted OTC meds. Home covid negative. Review of Systems   Review of Systems   HENT:  Positive for congestion, ear pain, rhinorrhea, sinus pressure and sinus pain. Respiratory:  Positive for cough. Negative for shortness of breath. All other systems reviewed and are negative.         Current Medications       Current Outpatient Medications:     azithromycin (ZITHROMAX) 250 mg tablet, Take 2 tablets today then 1 tablet daily x 4 days, Disp: 6 tablet, Rfl: 0    benzonatate (TESSALON) 200 MG capsule, Take 1 capsule (200 mg total) by mouth 3 (three) times a day as needed for cough, Disp: 20 capsule, Rfl: 0    cholecalciferol (VITAMIN D3) 1,000 units tablet, Take 2,000 Units by mouth daily, Disp: , Rfl:     Coenzyme Q10 (COQ-10) 200 MG CAPS, Take 1 capsule by mouth daily, Disp: , Rfl:     Probiotic Product (PROBIOTIC PO), Take 1 capsule by mouth daily, Disp: , Rfl:     Propylene Glycol (SYSTANE BALANCE OP), Apply 1 drop to eye as needed, Disp: , Rfl:     tetrahydrozoline (VISINE) 0.05 % ophthalmic solution, Administer 1 drop to both eyes 4 (four) times a day as needed for irritation, Disp: , Rfl:     Current Allergies     Allergies as of 11/03/2023 - Reviewed 11/03/2023   Allergen Reaction Noted    Nicotine Cough, Headache, Sneezing, and Nasal Congestion 12/15/2015            The following portions of the patient's history were reviewed and updated as appropriate: allergies, current medications, past family history, past medical history, past social history, past surgical history and problem list.     Past Medical History:   Diagnosis Date    Allergic        Past Surgical History:   Procedure Laterality Date    APPENDECTOMY      CERVICAL POLYPECTOMY      WISDOM TOOTH EXTRACTION         Family History   Problem Relation Age of Onset    Hypertension Family     Heart disease Family     Diabetes type II Family     Hypertension Mother     Heart disease Mother     Heart attack Father     Kidney disease Sister     Hypertension Sister          Medications have been verified. Objective   /97   Pulse 98   Temp 97.8 °F (36.6 °C) (Tympanic)   Resp 18   LMP 10/02/2017   SpO2 95%   Patient's last menstrual period was 10/02/2017. Physical Exam     Physical Exam  Vitals reviewed. Constitutional:       Appearance: Normal appearance. HENT:      Right Ear: Tympanic membrane normal.      Left Ear: Tympanic membrane normal.      Nose: Congestion and rhinorrhea present. Mouth/Throat:      Pharynx: Posterior oropharyngeal erythema present. Cardiovascular:      Rate and Rhythm: Normal rate and regular rhythm. Pulses: Normal pulses. Heart sounds: Normal heart sounds. Pulmonary:      Effort: Pulmonary effort is normal.      Breath sounds: Normal breath sounds. Musculoskeletal:         General: Normal range of motion. Lymphadenopathy:      Cervical: Cervical adenopathy present.    Neurological:      Mental Status: She is alert.

## 2023-12-03 ENCOUNTER — HOSPITAL ENCOUNTER (EMERGENCY)
Facility: HOSPITAL | Age: 53
Discharge: HOME | End: 2023-12-03
Attending: EMERGENCY MEDICINE
Payer: OTHER MISCELLANEOUS

## 2023-12-03 VITALS
BODY MASS INDEX: 38.09 KG/M2 | DIASTOLIC BLOOD PRESSURE: 95 MMHG | WEIGHT: 236 LBS | RESPIRATION RATE: 18 BRPM | OXYGEN SATURATION: 99 % | SYSTOLIC BLOOD PRESSURE: 141 MMHG | HEART RATE: 98 BPM | TEMPERATURE: 98.2 F

## 2023-12-03 DIAGNOSIS — S16.1XXA STRAIN OF NECK MUSCLE, INITIAL ENCOUNTER: ICD-10-CM

## 2023-12-03 DIAGNOSIS — M54.50 ACUTE LEFT-SIDED LOW BACK PAIN WITHOUT SCIATICA: Primary | ICD-10-CM

## 2023-12-03 PROCEDURE — 99284 EMERGENCY DEPT VISIT MOD MDM: CPT

## 2023-12-03 PROCEDURE — 99284 EMERGENCY DEPT VISIT MOD MDM: CPT | Performed by: PHYSICIAN ASSISTANT

## 2023-12-03 RX ORDER — LIDOCAINE 50 MG/G
1 PATCH TOPICAL ONCE
Status: DISCONTINUED | OUTPATIENT
Start: 2023-12-03 | End: 2023-12-03 | Stop reason: HOSPADM

## 2023-12-03 RX ORDER — LIDOCAINE 50 MG/G
1 PATCH TOPICAL DAILY
Qty: 6 PATCH | Refills: 0 | Status: SHIPPED | OUTPATIENT
Start: 2023-12-03

## 2023-12-03 RX ORDER — NAPROXEN 500 MG/1
500 TABLET ORAL 2 TIMES DAILY WITH MEALS
Qty: 30 TABLET | Refills: 0 | Status: SHIPPED | OUTPATIENT
Start: 2023-12-03

## 2023-12-03 RX ORDER — IBUPROFEN 600 MG/1
600 TABLET ORAL ONCE
Status: COMPLETED | OUTPATIENT
Start: 2023-12-03 | End: 2023-12-03

## 2023-12-03 RX ADMIN — IBUPROFEN 600 MG: 600 TABLET ORAL at 01:54

## 2023-12-03 RX ADMIN — LIDOCAINE 1 PATCH: 700 PATCH TOPICAL at 01:54

## 2023-12-03 NOTE — ED PROVIDER NOTES
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 Patient stated that there were a lot of belongings and that she had to pivot and move rapidly and began to feel pain. Patient states that she has had this several times in the past and believes it is a flare of her normal pain and denies any new complaints. Denies bowel or bladder incontinence or any retention saddle anesthesia. Denies any other complaints. History provided by:  Patient   used: No        Prior to Admission Medications   Prescriptions Last Dose Informant Patient Reported? Taking?    Coenzyme Q10 (COQ-10) 200 MG CAPS  Self Yes No   Sig: Take 1 capsule by mouth daily   Probiotic Product (PROBIOTIC PO)  Self Yes No   Sig: Take 1 capsule by mouth daily   Propylene Glycol (SYSTANE BALANCE OP)  Self Yes No   Sig: Apply 1 drop to eye as needed   benzonatate (TESSALON) 200 MG capsule   No No   Sig: Take 1 capsule (200 mg total) by mouth 3 (three) times a day as needed for cough   cholecalciferol (VITAMIN D3) 1,000 units tablet  Self Yes No   Sig: Take 2,000 Units by mouth daily   tetrahydrozoline (VISINE) 0.05 % ophthalmic solution  Self Yes No   Sig: Administer 1 drop to both eyes 4 (four) times a day as needed for irritation      Facility-Administered Medications: None       Past Medical History:   Diagnosis Date    Allergic        Past Surgical History:   Procedure Laterality Date    APPENDECTOMY      CERVICAL POLYPECTOMY      WISDOM TOOTH EXTRACTION         Family History   Problem Relation Age of Onset    Hypertension Family     Heart disease Family     Diabetes type II Family     Hypertension Mother     Heart disease Mother     Heart attack Father     Kidney disease Sister     Hypertension Sister      I have reviewed and agree with the history as documented. E-Cigarette/Vaping    E-Cigarette Use Never User      E-Cigarette/Vaping Substances    Nicotine No     THC No     CBD No     Flavoring No     Other No     Unknown No      Social History     Tobacco Use    Smoking status: Former     Types: Cigarettes    Smokeless tobacco: Never   Vaping Use    Vaping Use: Never used   Substance Use Topics    Alcohol use: No    Drug use: No       Review of Systems   Constitutional: Negative. Negative for chills and fatigue. HENT:  Negative for ear pain and sore throat. Eyes:  Negative for photophobia and redness. Respiratory:  Negative for apnea, cough and shortness of breath. Cardiovascular:  Negative for chest pain. Gastrointestinal:  Negative for abdominal pain, nausea and vomiting. Genitourinary:  Negative for dysuria. Musculoskeletal:  Positive for back pain and myalgias. Negative for arthralgias, neck pain and neck stiffness. Skin:  Negative for rash. Neurological:  Negative for dizziness, tremors, syncope and weakness. Psychiatric/Behavioral:  Negative for suicidal ideas. Physical Exam  Physical Exam  Constitutional:       General: She is not in acute distress. Appearance: She is well-developed. She is not diaphoretic. Eyes:      Pupils: Pupils are equal, round, and reactive to light. Cardiovascular:      Rate and Rhythm: Normal rate and regular rhythm. Pulmonary:      Effort: Pulmonary effort is normal. No respiratory distress. Breath sounds: Normal breath sounds. Abdominal:      General: Bowel sounds are normal. There is no distension. Palpations: Abdomen is soft. Musculoskeletal:         General: Normal range of motion. Cervical back: Normal range of motion and neck supple. Comments: Paraspinous tenderness in the left lumbar area as well as left paraspinous area in the neck. No posterior midline tenderness or table normality or step-off. Negative straight leg raise bilaterally.   Sensation intact distally. Reproducible with movement. Skin:     General: Skin is warm and dry. Neurological:      Mental Status: She is alert and oriented to person, place, and time. Vital Signs  ED Triage Vitals [12/03/23 0132]   Temperature Pulse Respirations Blood Pressure SpO2   98.2 °F (36.8 °C) 98 18 141/95 99 %      Temp Source Heart Rate Source Patient Position - Orthostatic VS BP Location FiO2 (%)   Oral Monitor Sitting Left arm --      Pain Score       --           Vitals:    12/03/23 0132   BP: 141/95   Pulse: 98   Patient Position - Orthostatic VS: Sitting         Visual Acuity      ED Medications  Medications   lidocaine (LIDODERM) 5 % patch 1 patch (has no administration in time range)   ibuprofen (MOTRIN) tablet 600 mg (has no administration in time range)       Diagnostic Studies  Results Reviewed       None                   No orders to display              Procedures  Procedures         ED Course                                             Medical Decision Making  Patient presents with history and physical exam consistent with musculoskeletal injury. No clear role for imaging at this time. Given medications for supportive care. Given return precautions. Discharged home. Disposition  Final diagnoses:   Acute left-sided low back pain without sciatica   Strain of neck muscle, initial encounter     Time reflects when diagnosis was documented in both MDM as applicable and the Disposition within this note       Time User Action Codes Description Comment    12/3/2023  1:43 AM Ramona Civil Add [M54.50] Acute left-sided low back pain without sciatica     12/3/2023  1:43 AM Ramona Civil Add [S16. 1XXA] Strain of neck muscle, initial encounter           ED Disposition       ED Disposition   Discharge    Condition   Stable    Date/Time   Sun Dec 3, 2023  1:43 AM    Comment   Sonia Sitter discharge to home/self care.                    Follow-up Information       Follow up With Specialties Details Why Contact Info Additional [address] [facility] Emergency Department Emergency Medicine Go to  If symptoms worsen [address] [phone] [address] [facility] Emergency Department    Lalo Arenas MD Family Medicine Call  As needed [address] [address]  [phone]               Patient's Medications   Discharge Prescriptions    LIDOCAINE (LIDODERM) 5 %    Apply 1 patch topically over 12 hours daily Remove & Discard patch within 12 hours or as directed by MD       Start Date: 12/3/2023 End Date: --       Order Dose: 1 patch       Quantity: 6 patch    Refills: 0    NAPROXEN (NAPROSYN) 500 MG TABLET    Take 1 tablet (500 mg total) by mouth 2 (two) times a day with meals       Start Date: 12/3/2023 End Date: --       Order Dose: 500 mg       Quantity: 30 tablet    Refills: 0       No discharge procedures on file.     PDMP Review       None            ED Provider  Electronically Signed by             Yuki Renee PA-C  12/03/23 0144

## 2023-12-03 NOTE — Clinical Note
Mayra Moncadas was seen and treated in our emergency department on 12/3/2023. Occupational Medicine Follow Up Within 24 hours            Diagnosis:     Geoffrey Nina  may return to gym class or sports after being cleared by physician. She may return on this date:     Cleared by occupational or employee health before return      If you have any questions or concerns, please don't hesitate to call.       Goldy Jorgensen PA-C    ______________________________           _______________          _______________  Hospital Representative                              Date                                Time

## 2023-12-03 NOTE — Clinical Note
Antoine Lara was seen and treated in our emergency department on 12/3/2023. Diagnosis:     Verito  . She may return on this date: If you have any questions or concerns, please don't hesitate to call.       Pallavi Nguyễn, DO    ______________________________           _______________          _______________  Hospital Representative                              Date                                Time

## 2023-12-04 ENCOUNTER — APPOINTMENT (OUTPATIENT)
Dept: URGENT CARE | Age: 53
End: 2023-12-04
Payer: OTHER MISCELLANEOUS

## 2023-12-04 PROCEDURE — G0382 LEV 3 HOSP TYPE B ED VISIT: HCPCS

## 2023-12-04 PROCEDURE — 99283 EMERGENCY DEPT VISIT LOW MDM: CPT

## 2023-12-12 DIAGNOSIS — Z00.6 ENCOUNTER FOR EXAMINATION FOR NORMAL COMPARISON OR CONTROL IN CLINICAL RESEARCH PROGRAM: ICD-10-CM

## 2023-12-21 ENCOUNTER — OFFICE VISIT (OUTPATIENT)
Dept: FAMILY MEDICINE CLINIC | Facility: CLINIC | Age: 53
End: 2023-12-21
Payer: COMMERCIAL

## 2023-12-21 ENCOUNTER — APPOINTMENT (OUTPATIENT)
Dept: LAB | Facility: CLINIC | Age: 53
End: 2023-12-21
Payer: COMMERCIAL

## 2023-12-21 VITALS
HEIGHT: 66 IN | SYSTOLIC BLOOD PRESSURE: 140 MMHG | TEMPERATURE: 97.4 F | HEART RATE: 102 BPM | OXYGEN SATURATION: 97 % | BODY MASS INDEX: 36.13 KG/M2 | DIASTOLIC BLOOD PRESSURE: 90 MMHG | WEIGHT: 224.8 LBS

## 2023-12-21 DIAGNOSIS — E66.9 OBESITY (BMI 30-39.9): ICD-10-CM

## 2023-12-21 DIAGNOSIS — Z00.00 ANNUAL PHYSICAL EXAM: ICD-10-CM

## 2023-12-21 DIAGNOSIS — Z13.6 SCREENING FOR CARDIOVASCULAR CONDITION: ICD-10-CM

## 2023-12-21 DIAGNOSIS — E78.00 PURE HYPERCHOLESTEROLEMIA: ICD-10-CM

## 2023-12-21 DIAGNOSIS — E55.9 VITAMIN D DEFICIENCY: ICD-10-CM

## 2023-12-21 DIAGNOSIS — Z00.00 ANNUAL PHYSICAL EXAM: Primary | ICD-10-CM

## 2023-12-21 DIAGNOSIS — Z00.6 ENCOUNTER FOR EXAMINATION FOR NORMAL COMPARISON OR CONTROL IN CLINICAL RESEARCH PROGRAM: ICD-10-CM

## 2023-12-21 DIAGNOSIS — R03.0 ELEVATED BP WITHOUT DIAGNOSIS OF HYPERTENSION: ICD-10-CM

## 2023-12-21 LAB
25(OH)D3 SERPL-MCNC: 29.6 NG/ML (ref 30–100)
ALBUMIN SERPL BCP-MCNC: 4.6 G/DL (ref 3.5–5)
ALP SERPL-CCNC: 120 U/L (ref 34–104)
ALT SERPL W P-5'-P-CCNC: 39 U/L (ref 7–52)
ANION GAP SERPL CALCULATED.3IONS-SCNC: 6 MMOL/L
AST SERPL W P-5'-P-CCNC: 22 U/L (ref 13–39)
BILIRUB SERPL-MCNC: 0.44 MG/DL (ref 0.2–1)
BUN SERPL-MCNC: 12 MG/DL (ref 5–25)
CALCIUM SERPL-MCNC: 9.4 MG/DL (ref 8.4–10.2)
CHLORIDE SERPL-SCNC: 105 MMOL/L (ref 96–108)
CO2 SERPL-SCNC: 30 MMOL/L (ref 21–32)
CREAT SERPL-MCNC: 0.76 MG/DL (ref 0.6–1.3)
ERYTHROCYTE [DISTWIDTH] IN BLOOD BY AUTOMATED COUNT: 13.1 % (ref 11.6–15.1)
GFR SERPL CREATININE-BSD FRML MDRD: 89 ML/MIN/1.73SQ M
GLUCOSE P FAST SERPL-MCNC: 87 MG/DL (ref 65–99)
HCT VFR BLD AUTO: 41.4 % (ref 34.8–46.1)
HGB BLD-MCNC: 13 G/DL (ref 11.5–15.4)
MCH RBC QN AUTO: 31.3 PG (ref 26.8–34.3)
MCHC RBC AUTO-ENTMCNC: 31.4 G/DL (ref 31.4–37.4)
MCV RBC AUTO: 100 FL (ref 82–98)
PLATELET # BLD AUTO: 246 THOUSANDS/UL (ref 149–390)
PMV BLD AUTO: 11 FL (ref 8.9–12.7)
POTASSIUM SERPL-SCNC: 4 MMOL/L (ref 3.5–5.3)
PROT SERPL-MCNC: 7.8 G/DL (ref 6.4–8.4)
RBC # BLD AUTO: 4.16 MILLION/UL (ref 3.81–5.12)
SODIUM SERPL-SCNC: 141 MMOL/L (ref 135–147)
WBC # BLD AUTO: 5.3 THOUSAND/UL (ref 4.31–10.16)

## 2023-12-21 PROCEDURE — 36415 COLL VENOUS BLD VENIPUNCTURE: CPT

## 2023-12-21 PROCEDURE — 85027 COMPLETE CBC AUTOMATED: CPT

## 2023-12-21 PROCEDURE — 99396 PREV VISIT EST AGE 40-64: CPT | Performed by: FAMILY MEDICINE

## 2023-12-21 PROCEDURE — 80053 COMPREHEN METABOLIC PANEL: CPT

## 2023-12-21 PROCEDURE — 82306 VITAMIN D 25 HYDROXY: CPT

## 2023-12-21 NOTE — PATIENT INSTRUCTIONS
Please get your labwork done fasting.  Do not eat/drink for about 8-12 hours prior to getting the labwork done, however you may drink water or black coffee while you are fasting.  Please use a [facility] lab if possible, as we receive the lab results more quickly.  We will notify you of your labwork results even if they are normal.  Please contact us if you do not hear about your lab results after one week.  Low Carb Recommendations:  Please follow a low carbohydrate, high protein diet.  [employer] is a good [employer]mar/computer program to keep food logs.    Your meals should be less than 30 grams of carbohydrates.    Your snacks should be less than 15 grams of carbohydrates.    You should drink at least 110 ounces of water daily.    You should walk at least 30 minutes daily.    Please check BP outside of office and send me some readings in a few weeks.          Wellness Visit for Adults   AMBULATORY CARE:   A wellness visit  is when you see your healthcare provider to get screened for health problems. Your healthcare provider will also give you advice on how to stay healthy. Write down your questions so you remember to ask them. Ask your healthcare provider how often you should have a wellness visit.  What happens at a wellness visit:  Your healthcare provider will ask about your health, and your family history of health problems. This includes high blood pressure, heart disease, and cancer. He or she will ask if you have symptoms that concern you, if you smoke, and about your mood. You may also be asked about your intake of medicines, supplements, food, and alcohol. Any of the following may be done:  Your weight  will be checked. Your height may also be checked so your body mass index (BMI) can be calculated. Your BMI shows if you are at a healthy weight.    Your blood pressure  and heart rate will be checked. Your temperature may also be checked.    Blood and urine tests  may be done. Blood tests may be done to check  your cholesterol levels. Abnormal cholesterol levels increase your risk for heart disease and stroke. You may also need a blood or urine test to check for diabetes if you are at increased risk. Urine tests may be done to look for signs of an infection or kidney disease.    A physical exam  includes checking your heartbeat and lungs with a stethoscope. Your healthcare provider may also check your skin to look for sun damage.    Screening tests  may be recommended. A screening test is done to check for diseases that may not cause symptoms. The screening tests you may need depend on your age, gender, family history, and lifestyle habits. For example, colorectal screening may be recommended if you are 50 years old or older.    Screening tests you need if you are a woman:   A Pap smear  is used to screen for cervical cancer. Pap smears are usually done every 3 to 5 years depending on your age. You may need them more often if you have had abnormal Pap smear test results in the past. Ask your healthcare provider how often you should have a Pap smear.    A mammogram  is an x-ray of your breasts to screen for breast cancer. Experts recommend mammograms every 2 years starting at age 50 years. You may need a mammogram at age 49 years or younger if you have an increased risk for breast cancer. Talk to your healthcare provider about when you should start having mammograms and how often you need them.    Vaccines you may need:   Get an influenza vaccine  every year. The influenza vaccine protects you from the flu. Several types of viruses cause the flu. The viruses change over time, so new vaccines are made each year.    Get a tetanus-diphtheria (Td) booster vaccine  every 10 years. This vaccine protects you against tetanus and diphtheria. Tetanus is a severe infection that may cause painful muscle spasms and lockjaw. Diphtheria is a severe bacterial infection that causes a thick covering in the back of your mouth and  throat.    Get a human papillomavirus (HPV) vaccine  if you are female and aged 19 to 26 or male 19 to 21 and never received it. This vaccine protects you from HPV infection. HPV is the most common infection spread by sexual contact. HPV may also cause vaginal, penile, and anal cancers.    Get a pneumococcal vaccine  if you are aged 65 years or older. The pneumococcal vaccine is an injection given to protect you from pneumococcal disease. Pneumococcal disease is an infection caused by pneumococcal bacteria. The infection may cause pneumonia, meningitis, or an ear infection.    Get a shingles vaccine  if you are 60 or older, even if you have had shingles before. The shingles vaccine is an injection to protect you from the varicella-zoster virus. This is the same virus that causes chickenpox. Shingles is a painful rash that develops in people who had chickenpox or have been exposed to the virus.    How to eat healthy:  My Plate is a model for planning healthy meals. It shows the types and amounts of foods that should go on your plate. Fruits and vegetables make up about half of your plate, and grains and protein make up the other half. A serving of dairy is included on the side of your plate. The amount of calories and serving sizes you need depends on your age, gender, weight, and height. Examples of healthy foods are listed below:  Eat a variety of vegetables  such as dark green, red, and orange vegetables. You can also include canned vegetables low in sodium (salt) and frozen vegetables without added butter or sauces.    Eat a variety of fresh fruits , canned fruit in 100% juice, frozen fruit, and dried fruit.    Include whole grains.  At least half of the grains you eat should be whole grains. Examples include whole-wheat bread, wheat pasta, brown rice, and whole-grain cereals such as oatmeal.    Eat a variety of protein foods such as seafood (fish and shellfish), lean meat, and poultry without skin (turkey and  chicken). Examples of lean meats include pork leg, shoulder, or tenderloin, and beef round, sirloin, tenderloin, and extra lean ground beef. Other protein foods include eggs and egg substitutes, beans, peas, soy products, nuts, and seeds.    Choose low-fat dairy products such as skim or 1% milk or low-fat yogurt, cheese, and cottage cheese.    Limit unhealthy fats  such as butter, hard margarine, and shortening.       Exercise:  Exercise at least 30 minutes per day on most days of the week. Some examples of exercise include walking, biking, dancing, and swimming. You can also fit in more physical activity by taking the stairs instead of the elevator or parking farther away from stores. Include muscle strengthening activities 2 days each week. Regular exercise provides many health benefits. It helps you manage your weight, and decreases your risk for type 2 diabetes, heart disease, stroke, and high blood pressure. Exercise can also help improve your mood. Ask your healthcare provider about the best exercise plan for you.       General health and safety guidelines:   Do not smoke.  Nicotine and other chemicals in cigarettes and cigars can cause lung damage. Ask your healthcare provider for information if you currently smoke and need help to quit. E-cigarettes or smokeless tobacco still contain nicotine. Talk to your healthcare provider before you use these products.    Limit alcohol.  A drink of alcohol is 12 ounces of beer, 5 ounces of wine, or 1½ ounces of liquor.    Lose weight, if needed.  Being overweight increases your risk of certain health conditions. These include heart disease, high blood pressure, type 2 diabetes, and certain types of cancer.    Protect your skin.  Do not sunbathe or use tanning beds. Use sunscreen with a SPF 15 or higher. Apply sunscreen at least 15 minutes before you go outside. Reapply sunscreen every 2 hours. Wear protective clothing, hats, and sunglasses when you are outside.    Drive  safely.  Always wear your seatbelt. Make sure everyone in your car wears a seatbelt. A seatbelt can save your life if you are in an accident. Do not use your cell phone when you are driving. This could distract you and cause an accident. Pull over if you need to make a call or send a text message.    Practice safe sex.  Use latex condoms if are sexually active and have more than one partner. Your healthcare provider may recommend screening tests for sexually transmitted infections (STIs).    Wear helmets, lifejackets, and protective gear.  Always wear a helmet when you ride a bike or motorcycle, go skiing, or play sports that could cause a head injury. Wear protective equipment when you play sports. Wear a lifejacket when you are on a boat or doing water sports.    © Copyright [employer] 2023 Information is for End User's use only and may not be sold, redistributed or otherwise used for commercial purposes.  The above information is an  only. It is not intended as medical advice for individual conditions or treatments. Talk to your doctor, nurse or pharmacist before following any medical regimen to see if it is safe and effective for you.

## 2023-12-21 NOTE — PROGRESS NOTES
Subjective:    SAUL Sellers is a 53 y.o. female here today for:  Chief Complaint   Patient presents with    Physical Exam   .      ---Above per clinical staff & reviewed. ---  HPI:  53yof here for well exam  Denies any issues  BP elevated in office, has been in past  States it is normal when she checks at home- declines meds- will send in BP readings  Had lipids and A1C for insurance- reviewed  Health maintenance reviewed  Discussed low carb high protein diet, increasing water intake  States she does not sleep well- works 6 days a week and kids         The following portions of the patient's history were reviewed and updated as appropriate: allergies, current medications, past family history, past medical history, past social history, past surgical history and problem list.    Past Medical History:   Diagnosis Date    Allergic        Past Surgical History:   Procedure Laterality Date    APPENDECTOMY      CERVICAL POLYPECTOMY      WISDOM TOOTH EXTRACTION         Social History     Socioeconomic History    Marital status:      Spouse name: None    Number of children: None    Years of education: None    Highest education level: None   Occupational History    None   Tobacco Use    Smoking status: Former     Types: Cigarettes    Smokeless tobacco: Never   Vaping Use    Vaping status: Never Used   Substance and Sexual Activity    Alcohol use: No    Drug use: No    Sexual activity: Not Currently     Partners: Male   Other Topics Concern    None   Social History Narrative    None     Social Determinants of Health     Financial Resource Strain: Not on file   Food Insecurity: Not on file   Transportation Needs: Not on file   Physical Activity: Not on file   Stress: Not on file   Social Connections: Not on file   Intimate Partner Violence: Not on file   Housing Stability: Not on file       Current Outpatient Medications   Medication Sig Dispense Refill    cholecalciferol (VITAMIN D3) 1,000 units tablet Take 2,000 Units  "by mouth daily      Coenzyme Q10 (COQ-10) 200 MG CAPS Take 1 capsule by mouth daily      Probiotic Product (PROBIOTIC PO) Take 1 capsule by mouth daily      Propylene Glycol (SYSTANE BALANCE OP) Apply 1 drop to eye as needed      tetrahydrozoline (VISINE) 0.05 % ophthalmic solution Administer 1 drop to both eyes 4 (four) times a day as needed for irritation       No current facility-administered medications for this visit.        Review of Systems   Constitutional:  Positive for fatigue. Negative for chills and fever.   HENT: Negative.  Negative for ear pain and sore throat.    Eyes:  Negative for pain and visual disturbance.   Respiratory: Negative.  Negative for cough and shortness of breath.    Cardiovascular: Negative.  Negative for chest pain and palpitations.   Gastrointestinal: Negative.  Negative for abdominal pain and vomiting.   Genitourinary: Negative.  Negative for dysuria and hematuria.   Musculoskeletal:  Negative for arthralgias and back pain.   Skin:  Negative for color change and rash.   Neurological: Negative.  Negative for seizures and syncope.   Psychiatric/Behavioral:  Positive for sleep disturbance.    All other systems reviewed and are negative.       Objective:    /90 (BP Location: Left arm, Patient Position: Sitting, Cuff Size: Large)   Pulse 102   Temp (!) 97.4 °F (36.3 °C) (Temporal)   Ht 5' 6\" (1.676 m)   Wt 102 kg (224 lb 12.8 oz)   LMP 10/02/2017   SpO2 97%   BMI 36.28 kg/m²   Wt Readings from Last 3 Encounters:   12/21/23 102 kg (224 lb 12.8 oz)   12/03/23 107 kg (236 lb)   07/28/23 99.3 kg (219 lb)     BP Readings from Last 3 Encounters:   12/21/23 140/90   12/03/23 141/95   11/03/23 153/97       Lab Results   Component Value Date    WBC 3.51 (L) 12/13/2022    HGB 12.9 12/13/2022    HCT 40.8 12/13/2022     12/13/2022    TRIG 106 06/05/2023    HDL 66 06/05/2023    ALT 42 08/19/2022    AST 26 08/19/2022    K 4.5 12/13/2022     12/13/2022    CREATININE 0.80 " 12/13/2022    BUN 12 12/13/2022    CO2 26 12/13/2022    GLUF 100 (H) 12/13/2022    HGBA1C 5.4 06/05/2023       Physical Exam  Vitals and nursing note reviewed.   Constitutional:       Appearance: Normal appearance. She is well-developed.   HENT:      Head: Normocephalic and atraumatic.      Right Ear: Tympanic membrane and external ear normal.      Left Ear: Tympanic membrane and external ear normal.      Nose: Nose normal.      Mouth/Throat:      Mouth: Mucous membranes are moist.   Eyes:      Conjunctiva/sclera: Conjunctivae normal.      Pupils: Pupils are equal, round, and reactive to light.   Neck:      Thyroid: No thyromegaly.      Comments: No carotid bruits  Cardiovascular:      Rate and Rhythm: Normal rate and regular rhythm.      Heart sounds: Normal heart sounds. No murmur heard.  Pulmonary:      Effort: Pulmonary effort is normal. No respiratory distress.      Breath sounds: Normal breath sounds.   Abdominal:      General: Bowel sounds are normal. There is no distension.      Palpations: Abdomen is soft.      Tenderness: There is no abdominal tenderness.   Musculoskeletal:         General: Normal range of motion.      Cervical back: Normal range of motion and neck supple.   Skin:     General: Skin is warm.      Capillary Refill: Capillary refill takes less than 2 seconds.   Neurological:      Mental Status: She is alert and oriented to person, place, and time.      Cranial Nerves: No cranial nerve deficit.   Psychiatric:         Mood and Affect: Mood normal.         Thought Content: Thought content normal.                       Assessment/Plan:   Verito was seen today for physical exam.    Diagnoses and all orders for this visit:    Annual physical exam  -     CBC and Platelet; Future  -     Comprehensive metabolic panel; Future  -     Vitamin D 25 hydroxy; Future    Screening for cardiovascular condition  -     CBC and Platelet; Future  -     Comprehensive metabolic panel; Future  -     Vitamin D 25  hydroxy; Future    Vitamin D deficiency  -     CBC and Platelet; Future  -     Comprehensive metabolic panel; Future  -     Vitamin D 25 hydroxy; Future    Pure hypercholesterolemia  -     CBC and Platelet; Future  -     Comprehensive metabolic panel; Future  -     Vitamin D 25 hydroxy; Future    Obesity (BMI 30-39.9)  -     CBC and Platelet; Future  -     Comprehensive metabolic panel; Future  -     Vitamin D 25 hydroxy; Future    Elevated BP without diagnosis of hypertension      Return in about 1 year (around 12/21/2024) for Annual physical.  Patient Instructions   Please get your labwork done fasting.  Do not eat/drink for about 8-12 hours prior to getting the labwork done, however you may drink water or black coffee while you are fasting.  Please use a [facility] lab if possible, as we receive the lab results more quickly.  We will notify you of your labwork results even if they are normal.  Please contact us if you do not hear about your lab results after one week.  Low Carb Recommendations:  Please follow a low carbohydrate, high protein diet.  [employer] is a good [employer]mar/computer program to keep food logs.    Your meals should be less than 30 grams of carbohydrates.    Your snacks should be less than 15 grams of carbohydrates.    You should drink at least 110 ounces of water daily.    You should walk at least 30 minutes daily.    Please check BP outside of office and send me some readings in a few weeks.          Wellness Visit for Adults   AMBULATORY CARE:   A wellness visit  is when you see your healthcare provider to get screened for health problems. Your healthcare provider will also give you advice on how to stay healthy. Write down your questions so you remember to ask them. Ask your healthcare provider how often you should have a wellness visit.  What happens at a wellness visit:  Your healthcare provider will ask about your health, and your family history of health problems. This includes high blood  pressure, heart disease, and cancer. He or she will ask if you have symptoms that concern you, if you smoke, and about your mood. You may also be asked about your intake of medicines, supplements, food, and alcohol. Any of the following may be done:  Your weight  will be checked. Your height may also be checked so your body mass index (BMI) can be calculated. Your BMI shows if you are at a healthy weight.    Your blood pressure  and heart rate will be checked. Your temperature may also be checked.    Blood and urine tests  may be done. Blood tests may be done to check your cholesterol levels. Abnormal cholesterol levels increase your risk for heart disease and stroke. You may also need a blood or urine test to check for diabetes if you are at increased risk. Urine tests may be done to look for signs of an infection or kidney disease.    A physical exam  includes checking your heartbeat and lungs with a stethoscope. Your healthcare provider may also check your skin to look for sun damage.    Screening tests  may be recommended. A screening test is done to check for diseases that may not cause symptoms. The screening tests you may need depend on your age, gender, family history, and lifestyle habits. For example, colorectal screening may be recommended if you are 50 years old or older.    Screening tests you need if you are a woman:   A Pap smear  is used to screen for cervical cancer. Pap smears are usually done every 3 to 5 years depending on your age. You may need them more often if you have had abnormal Pap smear test results in the past. Ask your healthcare provider how often you should have a Pap smear.    A mammogram  is an x-ray of your breasts to screen for breast cancer. Experts recommend mammograms every 2 years starting at age 50 years. You may need a mammogram at age 49 years or younger if you have an increased risk for breast cancer. Talk to your healthcare provider about when you should start having  mammograms and how often you need them.    Vaccines you may need:   Get an influenza vaccine  every year. The influenza vaccine protects you from the flu. Several types of viruses cause the flu. The viruses change over time, so new vaccines are made each year.    Get a tetanus-diphtheria (Td) booster vaccine  every 10 years. This vaccine protects you against tetanus and diphtheria. Tetanus is a severe infection that may cause painful muscle spasms and lockjaw. Diphtheria is a severe bacterial infection that causes a thick covering in the back of your mouth and throat.    Get a human papillomavirus (HPV) vaccine  if you are female and aged 19 to 26 or male 19 to 21 and never received it. This vaccine protects you from HPV infection. HPV is the most common infection spread by sexual contact. HPV may also cause vaginal, penile, and anal cancers.    Get a pneumococcal vaccine  if you are aged 65 years or older. The pneumococcal vaccine is an injection given to protect you from pneumococcal disease. Pneumococcal disease is an infection caused by pneumococcal bacteria. The infection may cause pneumonia, meningitis, or an ear infection.    Get a shingles vaccine  if you are 60 or older, even if you have had shingles before. The shingles vaccine is an injection to protect you from the varicella-zoster virus. This is the same virus that causes chickenpox. Shingles is a painful rash that develops in people who had chickenpox or have been exposed to the virus.    How to eat healthy:  My Plate is a model for planning healthy meals. It shows the types and amounts of foods that should go on your plate. Fruits and vegetables make up about half of your plate, and grains and protein make up the other half. A serving of dairy is included on the side of your plate. The amount of calories and serving sizes you need depends on your age, gender, weight, and height. Examples of healthy foods are listed below:  Eat a variety of vegetables   such as dark green, red, and orange vegetables. You can also include canned vegetables low in sodium (salt) and frozen vegetables without added butter or sauces.    Eat a variety of fresh fruits , canned fruit in 100% juice, frozen fruit, and dried fruit.    Include whole grains.  At least half of the grains you eat should be whole grains. Examples include whole-wheat bread, wheat pasta, brown rice, and whole-grain cereals such as oatmeal.    Eat a variety of protein foods such as seafood (fish and shellfish), lean meat, and poultry without skin (turkey and chicken). Examples of lean meats include pork leg, shoulder, or tenderloin, and beef round, sirloin, tenderloin, and extra lean ground beef. Other protein foods include eggs and egg substitutes, beans, peas, soy products, nuts, and seeds.    Choose low-fat dairy products such as skim or 1% milk or low-fat yogurt, cheese, and cottage cheese.    Limit unhealthy fats  such as butter, hard margarine, and shortening.       Exercise:  Exercise at least 30 minutes per day on most days of the week. Some examples of exercise include walking, biking, dancing, and swimming. You can also fit in more physical activity by taking the stairs instead of the elevator or parking farther away from stores. Include muscle strengthening activities 2 days each week. Regular exercise provides many health benefits. It helps you manage your weight, and decreases your risk for type 2 diabetes, heart disease, stroke, and high blood pressure. Exercise can also help improve your mood. Ask your healthcare provider about the best exercise plan for you.       General health and safety guidelines:   Do not smoke.  Nicotine and other chemicals in cigarettes and cigars can cause lung damage. Ask your healthcare provider for information if you currently smoke and need help to quit. E-cigarettes or smokeless tobacco still contain nicotine. Talk to your healthcare provider before you use these  products.    Limit alcohol.  A drink of alcohol is 12 ounces of beer, 5 ounces of wine, or 1½ ounces of liquor.    Lose weight, if needed.  Being overweight increases your risk of certain health conditions. These include heart disease, high blood pressure, type 2 diabetes, and certain types of cancer.    Protect your skin.  Do not sunbathe or use tanning beds. Use sunscreen with a SPF 15 or higher. Apply sunscreen at least 15 minutes before you go outside. Reapply sunscreen every 2 hours. Wear protective clothing, hats, and sunglasses when you are outside.    Drive safely.  Always wear your seatbelt. Make sure everyone in your car wears a seatbelt. A seatbelt can save your life if you are in an accident. Do not use your cell phone when you are driving. This could distract you and cause an accident. Pull over if you need to make a call or send a text message.    Practice safe sex.  Use latex condoms if are sexually active and have more than one partner. Your healthcare provider may recommend screening tests for sexually transmitted infections (STIs).    Wear helmets, lifejackets, and protective gear.  Always wear a helmet when you ride a bike or motorcycle, go skiing, or play sports that could cause a head injury. Wear protective equipment when you play sports. Wear a lifejacket when you are on a boat or doing water sports.    © Copyright [employer] 2023 Information is for End User's use only and may not be sold, redistributed or otherwise used for commercial purposes.  The above information is an  only. It is not intended as medical advice for individual conditions or treatments. Talk to your doctor, nurse or pharmacist before following any medical regimen to see if it is safe and effective for you.

## 2024-01-19 ENCOUNTER — OFFICE VISIT (OUTPATIENT)
Dept: URGENT CARE | Age: 54
End: 2024-01-19
Payer: COMMERCIAL

## 2024-01-19 VITALS
HEART RATE: 108 BPM | SYSTOLIC BLOOD PRESSURE: 126 MMHG | TEMPERATURE: 100.9 F | RESPIRATION RATE: 18 BRPM | DIASTOLIC BLOOD PRESSURE: 82 MMHG | OXYGEN SATURATION: 99 %

## 2024-01-19 DIAGNOSIS — R05.1 ACUTE COUGH: Primary | ICD-10-CM

## 2024-01-19 LAB
SARS-COV-2 AG UPPER RESP QL IA: POSITIVE
VALID CONTROL: ABNORMAL

## 2024-01-19 PROCEDURE — 87811 SARS-COV-2 COVID19 W/OPTIC: CPT

## 2024-01-19 PROCEDURE — 99213 OFFICE O/P EST LOW 20 MIN: CPT

## 2024-01-19 NOTE — LETTER
January 19, 2024     Patient: Verito Weathers   YOB: 1970   Date of Visit: 1/19/2024       To Whom it May Concern:    Verito Weathers was seen in my clinic on 1/19/2024. She may return to work 01/21/2024 if fever free for 24 hours. Febrile during visit.    If you have any questions or concerns, please don't hesitate to call.         Sincerely,          CARLOS Valentin        CC: No Recipients

## 2024-01-19 NOTE — PROGRESS NOTES
[facility]        NAME: Verito Weathers is a 53 y.o. female  : 1970    MRN: [id number]  DATE: 2024  TIME: 10:48 AM    Assessment and Plan   Acute cough [R05.1]  1. Acute cough  Poct Covid 19 Rapid Antigen Test        Cough, congestion, chills at times. Symptoms manageable. Daughter with covid at home. POCT COVID positive. Discussed symptom management.     Patient Instructions       Follow up with PCP as needed    Chief Complaint     Chief Complaint   Patient presents with    Sore Throat     Wednesday started dry cough. Daughter positive with Covid on Monday, home covid test negative. Cough has now worsened, body aches, pain of skin with loofa. Runny nose, headache, chills and sweats at times. Some nausea at times         History of Present Illness       Cough, congestion, chills at times. Symptoms manageable. Daughter with covid at home. POCT COVID positive. Discussed symptom management.     Sore Throat   Associated symptoms include congestion and coughing. Pertinent negatives include no shortness of breath.       Review of Systems   Review of Systems   Constitutional:  Positive for activity change and chills.   HENT:  Positive for congestion, postnasal drip and sore throat.    Respiratory:  Positive for cough. Negative for shortness of breath and wheezing.          Current Medications       Current Outpatient Medications:     cholecalciferol (VITAMIN D3) 1,000 units tablet, Take 2,000 Units by mouth daily, Disp: , Rfl:     Coenzyme Q10 (COQ-10) 200 MG CAPS, Take 1 capsule by mouth daily, Disp: , Rfl:     Probiotic Product (PROBIOTIC PO), Take 1 capsule by mouth daily, Disp: , Rfl:     Propylene Glycol (SYSTANE BALANCE OP), Apply 1 drop to eye as needed, Disp: , Rfl:     tetrahydrozoline (VISINE) 0.05 % ophthalmic solution, Administer 1 drop to both eyes 4 (four) times a day as needed for irritation, Disp: , Rfl:     Current Allergies     Allergies as of 2024 - Reviewed 2024    Allergen Reaction Noted    Nicotine Cough, Headache, Sneezing, and Nasal Congestion 12/15/2015            The following portions of the patient's history were reviewed and updated as appropriate: allergies, current medications, past family history, past medical history, past social history, past surgical history and problem list.     Past Medical History:   Diagnosis Date    Allergic        Past Surgical History:   Procedure Laterality Date    APPENDECTOMY      CERVICAL POLYPECTOMY      WISDOM TOOTH EXTRACTION         Family History   Problem Relation Age of Onset    Hypertension Family     Heart disease Family     Diabetes type II Family     Hypertension Mother     Heart disease Mother     Heart attack Father     Kidney disease Sister     Hypertension Sister          Medications have been verified.        Objective   /82   Pulse (!) 108   Temp (!) 100.9 °F (38.3 °C)   Resp 18   LMP 10/02/2017   SpO2 99%   Patient's last menstrual period was 10/02/2017.       Physical Exam     Physical Exam  Vitals reviewed.   Constitutional:       Appearance: She is well-developed.   HENT:      Right Ear: A middle ear effusion is present.      Left Ear: A middle ear effusion is present.      Nose: Congestion and rhinorrhea present.   Cardiovascular:      Rate and Rhythm: Normal rate and regular rhythm.   Pulmonary:      Effort: Pulmonary effort is normal.      Breath sounds: Normal breath sounds.   Lymphadenopathy:      Cervical: Cervical adenopathy present.   Neurological:      Mental Status: She is alert.

## 2024-01-25 LAB
APOB+LDLR+PCSK9 GENE MUT ANL BLD/T: NOT DETECTED
BRCA1+BRCA2 DEL+DUP + FULL MUT ANL BLD/T: NOT DETECTED
MLH1+MSH2+MSH6+PMS2 GN DEL+DUP+FUL M: NOT DETECTED

## 2024-01-31 ENCOUNTER — HOSPITAL ENCOUNTER (OUTPATIENT)
Dept: MAMMOGRAPHY | Facility: CLINIC | Age: 54
Discharge: HOME | End: 2024-01-31
Payer: COMMERCIAL

## 2024-01-31 VITALS — HEIGHT: 66 IN | BODY MASS INDEX: 36 KG/M2 | WEIGHT: 224 LBS

## 2024-01-31 DIAGNOSIS — Z12.31 ENCOUNTER FOR SCREENING MAMMOGRAM FOR BREAST CANCER: ICD-10-CM

## 2024-01-31 PROCEDURE — 77063 BREAST TOMOSYNTHESIS BI: CPT

## 2024-01-31 PROCEDURE — 77067 SCR MAMMO BI INCL CAD: CPT

## 2024-04-09 ENCOUNTER — OFFICE VISIT (OUTPATIENT)
Dept: FAMILY MEDICINE CLINIC | Facility: CLINIC | Age: 54
End: 2024-04-09
Payer: COMMERCIAL

## 2024-04-09 VITALS
BODY MASS INDEX: 35.45 KG/M2 | OXYGEN SATURATION: 98 % | DIASTOLIC BLOOD PRESSURE: 82 MMHG | TEMPERATURE: 97.8 F | HEART RATE: 78 BPM | WEIGHT: 220.6 LBS | SYSTOLIC BLOOD PRESSURE: 140 MMHG | HEIGHT: 66 IN

## 2024-04-09 DIAGNOSIS — B34.9 VIRAL ILLNESS: Primary | ICD-10-CM

## 2024-04-09 LAB
S PYO AG THROAT QL: NEGATIVE
SARS-COV-2 AG UPPER RESP QL IA: NEGATIVE
VALID CONTROL: NORMAL

## 2024-04-09 PROCEDURE — 99213 OFFICE O/P EST LOW 20 MIN: CPT | Performed by: FAMILY MEDICINE

## 2024-04-09 PROCEDURE — 87880 STREP A ASSAY W/OPTIC: CPT | Performed by: FAMILY MEDICINE

## 2024-04-09 PROCEDURE — 87811 SARS-COV-2 COVID19 W/OPTIC: CPT | Performed by: FAMILY MEDICINE

## 2024-04-09 NOTE — PROGRESS NOTES
"Assessment/Plan:      Viral illness  - POCT rapid covid and strep testing negative. Recommend supportive care at this time with plenty of hydration, salt water gargles, honey, alternating tylenol/NSAIDs etc    -     POCT Rapid Covid Ag  -     POCT rapid ANTIGEN strepA          Subjective:      Patient ID: Verito Weathers is a 53 y.o. female.    URI   This is a new problem. Episode onset: 3 days ago. The problem has been unchanged. There has been no fever. Associated symptoms include abdominal pain, diarrhea, ear pain, headaches, nausea and a sore throat. Pertinent negatives include no coughing, rash, sinus pain, vomiting or wheezing. She has tried nothing for the symptoms. The treatment provided no relief.     Patient reports that the abdominal pain and diarrhea lasted one day and has resolved.     The following portions of the patient's history were reviewed and updated as appropriate: allergies, current medications, past family history, past medical history, past social history, past surgical history, and problem list.    Review of Systems   Constitutional:  Negative for chills and fever.   HENT:  Positive for ear pain and sore throat. Negative for sinus pain.    Respiratory:  Negative for cough and wheezing.    Gastrointestinal:  Positive for abdominal pain, diarrhea and nausea. Negative for vomiting.   Musculoskeletal:  Positive for myalgias.   Skin:  Negative for rash.   Neurological:  Positive for headaches.         Objective:      /82 (BP Location: Left arm, Patient Position: Sitting, Cuff Size: Large)   Pulse 78   Temp 97.8 °F (36.6 °C) (Temporal)   Ht 5' 6\" (1.676 m)   Wt 100 kg (220 lb 9.6 oz)   LMP 10/02/2017   SpO2 98%   BMI 35.61 kg/m²          Physical Exam  Constitutional:       General: She is not in acute distress.     Appearance: She is not ill-appearing.   HENT:      Head: Normocephalic and atraumatic.      Right Ear: Tympanic membrane and ear canal normal.      Left Ear: Tympanic " membrane and ear canal normal.      Mouth/Throat:      Tonsils: No tonsillar exudate or tonsillar abscesses.   Eyes:      Extraocular Movements:      Right eye: Normal extraocular motion.      Left eye: Normal extraocular motion.      Pupils: Pupils are equal, round, and reactive to light.   Cardiovascular:      Rate and Rhythm: Normal rate.   Pulmonary:      Effort: Pulmonary effort is normal. No respiratory distress.      Breath sounds: No wheezing.   Lymphadenopathy:      Cervical: No cervical adenopathy.   Neurological:      General: No focal deficit present.      Mental Status: She is alert.   Psychiatric:         Mood and Affect: Mood normal.         Behavior: Behavior normal.

## 2024-08-14 ENCOUNTER — APPOINTMENT (OUTPATIENT)
Dept: LAB | Facility: CLINIC | Age: 54
End: 2024-08-14
Payer: COMMERCIAL

## 2024-08-14 ENCOUNTER — TRANSCRIBE ORDERS (OUTPATIENT)
Dept: LAB | Facility: CLINIC | Age: 54
End: 2024-08-14

## 2024-08-14 ENCOUNTER — ANNUAL EXAM (OUTPATIENT)
Dept: OBGYN CLINIC | Facility: CLINIC | Age: 54
End: 2024-08-14
Payer: COMMERCIAL

## 2024-08-14 ENCOUNTER — CLINICAL SUPPORT (OUTPATIENT)
Dept: BARIATRICS | Facility: CLINIC | Age: 54
End: 2024-08-14
Payer: COMMERCIAL

## 2024-08-14 VITALS
HEART RATE: 85 BPM | SYSTOLIC BLOOD PRESSURE: 122 MMHG | WEIGHT: 226.4 LBS | DIASTOLIC BLOOD PRESSURE: 72 MMHG | BODY MASS INDEX: 36.38 KG/M2 | HEIGHT: 66 IN | OXYGEN SATURATION: 98 %

## 2024-08-14 VITALS — BODY MASS INDEX: 36.26 KG/M2 | HEIGHT: 66 IN | WEIGHT: 225.6 LBS

## 2024-08-14 DIAGNOSIS — Z12.4 SCREENING FOR CERVICAL CANCER: Primary | ICD-10-CM

## 2024-08-14 DIAGNOSIS — Z00.8 HEALTH EXAMINATION IN POPULATION SURVEY: ICD-10-CM

## 2024-08-14 DIAGNOSIS — Z00.8 HEALTH EXAMINATION IN POPULATION SURVEY: Primary | ICD-10-CM

## 2024-08-14 DIAGNOSIS — E66.9 CLASS 1 OBESITY WITHOUT SERIOUS COMORBIDITY WITH BODY MASS INDEX (BMI) OF 30.0 TO 30.9 IN ADULT, UNSPECIFIED OBESITY TYPE: ICD-10-CM

## 2024-08-14 DIAGNOSIS — Z01.419 ENCOUNTER FOR ANNUAL ROUTINE GYNECOLOGICAL EXAMINATION: ICD-10-CM

## 2024-08-14 DIAGNOSIS — E66.09 CLASS 2 OBESITY DUE TO EXCESS CALORIES WITH BODY MASS INDEX (BMI) OF 36.0 TO 36.9 IN ADULT: Primary | ICD-10-CM

## 2024-08-14 LAB
CHOLEST SERPL-MCNC: 221 MG/DL
EST. AVERAGE GLUCOSE BLD GHB EST-MCNC: 114 MG/DL
HBA1C MFR BLD: 5.6 %
HDLC SERPL-MCNC: 67 MG/DL
LDLC SERPL CALC-MCNC: 118 MG/DL (ref 0–100)
NONHDLC SERPL-MCNC: 154 MG/DL
TRIGL SERPL-MCNC: 179 MG/DL

## 2024-08-14 PROCEDURE — 80061 LIPID PANEL: CPT

## 2024-08-14 PROCEDURE — 83036 HEMOGLOBIN GLYCOSYLATED A1C: CPT

## 2024-08-14 PROCEDURE — S0612 ANNUAL GYNECOLOGICAL EXAMINA: HCPCS | Performed by: OBSTETRICS & GYNECOLOGY

## 2024-08-14 PROCEDURE — 36415 COLL VENOUS BLD VENIPUNCTURE: CPT

## 2024-08-14 PROCEDURE — RECHECK

## 2024-08-14 PROCEDURE — S9470 NUTRITIONAL COUNSELING, DIET: HCPCS

## 2024-08-14 PROCEDURE — G0476 HPV COMBO ASSAY CA SCREEN: HCPCS | Performed by: OBSTETRICS & GYNECOLOGY

## 2024-08-14 PROCEDURE — G0145 SCR C/V CYTO,THINLAYER,RESCR: HCPCS | Performed by: OBSTETRICS & GYNECOLOGY

## 2024-08-14 NOTE — PROGRESS NOTES
Subjective      Verito Weathers is a 54 y.o. female who presents for annual exam.      Chief Complaint   Patient presents with    Gynecologic Exam     No PMB or other concerns   Mother recently  related to complications from strokes, Alzheimer's    Last Pap: 2021 NILM/HPV neg   Last mammogram: 2024 BIRADS1  Colorectal cancer screening: Not on file  Cologuard negative   DEXA: n/a       Current contraception: post menopausal status  History of abnormal Pap smear: yes, remote h/o abnml, denies excisional procedure    History of abnormal mammogram: no      Family history of uterine or ovarian cancer: no  Family history of breast cancer: no  Family history of colon cancer: no      Menstrual History:  OB History          3    Para   3    Term   3       0    AB        Living   3         SAB   0    IAB   0    Ectopic   0    Multiple   0    Live Births   3                    Patient's last menstrual period was 10/02/2017.         Past Medical History:   Diagnosis Date    Allergic      Past Surgical History:   Procedure Laterality Date    APPENDECTOMY      CERVICAL POLYPECTOMY      WISDOM TOOTH EXTRACTION       Family History   Problem Relation Age of Onset    Hypertension Mother     Heart disease Mother     Heart attack Father     Kidney disease Sister     Hypertension Sister     Hypertension Family     Heart disease Family     Diabetes type II Family     Breast cancer Neg Hx        Social History     Tobacco Use    Smoking status: Former     Types: Cigarettes    Smokeless tobacco: Never   Vaping Use    Vaping status: Never Used   Substance Use Topics    Alcohol use: No    Drug use: No          Current Outpatient Medications:     cholecalciferol (VITAMIN D3) 1,000 units tablet, Take 2,000 Units by mouth daily, Disp: , Rfl:     Coenzyme Q10 (COQ-10) 200 MG CAPS, Take 1 capsule by mouth daily, Disp: , Rfl:     Probiotic Product (PROBIOTIC PO), Take 1 capsule by mouth daily, Disp: , Rfl:  "    Propylene Glycol (SYSTANE BALANCE OP), Apply 1 drop to eye as needed, Disp: , Rfl:     tetrahydrozoline (VISINE) 0.05 % ophthalmic solution, Administer 1 drop to both eyes 4 (four) times a day as needed for irritation, Disp: , Rfl:     Allergies   Allergen Reactions    Nicotine Cough, Headache, Sneezing and Nasal Congestion     smoke           Review of Systems   Constitutional:  Negative for appetite change, chills and fever.   Eyes:  Negative for visual disturbance.   Respiratory:  Negative for cough, chest tightness and shortness of breath.    Cardiovascular:  Negative for chest pain.   Gastrointestinal:  Negative for abdominal distention, abdominal pain, constipation, diarrhea, nausea and vomiting.   Endocrine: Negative for cold intolerance and heat intolerance.   Genitourinary:  Negative for difficulty urinating, dyspareunia, dysuria, frequency, genital sores, pelvic pain, urgency, vaginal bleeding, vaginal discharge and vaginal pain.   Musculoskeletal:  Negative for arthralgias.   Neurological:  Negative for light-headedness and headaches.   Hematological:  Does not bruise/bleed easily.   Psychiatric/Behavioral:  Negative for behavioral problems.    All other systems reviewed and are negative.      /72 (BP Location: Right arm, Patient Position: Sitting, Cuff Size: Adult)   Pulse 85   Ht 5' 6\" (1.676 m)   Wt 103 kg (226 lb 6.4 oz)   LMP 10/02/2017   SpO2 98%   BMI 36.54 kg/m²         Physical Exam  Constitutional:       General: She is not in acute distress.     Appearance: Normal appearance.   Genitourinary:      Vulva, bladder and urethral meatus normal.      No lesions in the vagina.      Right Labia: No rash, tenderness, lesions or skin changes.     Left Labia: No tenderness, lesions, skin changes or rash.     No labial fusion noted.      No inguinal adenopathy present in the right or left side.     No vaginal discharge, erythema, tenderness, bleeding or ulceration.      No vaginal prolapse " present.     Mild vaginal atrophy present.       Right Adnexa: not tender, not full and no mass present.     Left Adnexa: not tender, not full and no mass present.     No cervical motion tenderness, discharge, friability, lesion or polyp.      Uterus is not enlarged, fixed, tender or irregular.      No uterine mass detected.     Uterus is anteverted.      Pelvic exam was performed with patient in the lithotomy position.   Breasts:     Right: No swelling, bleeding, inverted nipple, mass, nipple discharge, skin change or tenderness.      Left: No swelling, bleeding, inverted nipple, mass, nipple discharge, skin change or tenderness.   HENT:      Head: Normocephalic and atraumatic.   Neck:      Thyroid: No thyromegaly.   Cardiovascular:      Rate and Rhythm: Normal rate and regular rhythm.   Pulmonary:      Effort: Pulmonary effort is normal. No accessory muscle usage or respiratory distress.   Abdominal:      General: There is no distension.      Palpations: Abdomen is soft.      Tenderness: There is no abdominal tenderness. There is no guarding or rebound.   Musculoskeletal:         General: Normal range of motion.      Cervical back: Normal range of motion and neck supple.   Lymphadenopathy:      Upper Body:      Right upper body: No supraclavicular or axillary adenopathy.      Left upper body: No supraclavicular or axillary adenopathy.      Lower Body: No right inguinal and no right inguinal adenopathy. No left inguinal and no left inguinal adenopathy.   Neurological:      General: No focal deficit present.      Mental Status: She is alert.   Skin:     General: Skin is warm and dry.      Findings: No erythema.   Psychiatric:         Mood and Affect: Mood normal.         Behavior: Behavior normal.   Vitals and nursing note reviewed. Exam conducted with a chaperone present.               Encounter for annual routine gynecological examination  - Discussed ACOG guidelines for pap smear screening frequency: performed  today  - Discussed healthy lifestyle recommendations for diet, exercise and self breast awareness.  - Discussed ACOG recommendations for screening mammograms: up to date/not indicated today.  - Discussed age based recommendations for adequate calcium and vitamin D intake. No additional osteoporosis screening indicated at this time.  - Discussed ACOG recommendations for colon cancer screening: not indicated at this time.  - Safe sex practices were discussed and STI testing was not desired by the patient  - Contraceptive options were reviewed: n/a postmenopausal   - Routine follow up in 1 year was recommended or sooner as needed. All questions and concerns were addressed.

## 2024-08-14 NOTE — ASSESSMENT & PLAN NOTE
- Discussed ACOG guidelines for pap smear screening frequency: performed today  - Discussed healthy lifestyle recommendations for diet, exercise and self breast awareness.  - Discussed ACOG recommendations for screening mammograms: up to date/not indicated today.  - Discussed age based recommendations for adequate calcium and vitamin D intake. No additional osteoporosis screening indicated at this time.  - Discussed ACOG recommendations for colon cancer screening: not indicated at this time.  - Safe sex practices were discussed and STI testing was not desired by the patient  - Contraceptive options were reviewed: n/a postmenopausal   - Routine follow up in 1 year was recommended or sooner as needed. All questions and concerns were addressed.

## 2024-08-14 NOTE — PROGRESS NOTES
Weight Management Medical Nutrition Assessment    Verito presented for a meal planning session 1/12 employee benefit. Today's weight is 225.6#. Suffers from chronic sleep disorder, will eat and drink sugary beverages to help her stay awake. Encouraged Verito to evaluate options to improve her sleep, such as making more time in her schedule or seeking a sleep specialist to help. Educated on effects of inadequate sleep on her eating habits. Recommended to decrease sugar sweetened beverages and increase hydration. Is interested in a partial meal replacement at this time. Developed and reviewed a low calorie meal plan. Product ordered and received today. F/u 2 weeks.       Patient seen by Medical Provider in past 6 months:  no  Requested to schedule appointment with Medical Provider: No      Anthropometric Measurements  Start Weight (#): 225.6 (8/14/24)  Current Weight (#): 225.6   TBW % Change from start weight:n/a  Ideal Body Weight (#):128.5  Goal Weight (#):158  Highest: current 225.6  Lowest: 158 before she moved from [place]     Weight Loss History  Previous weight loss attempts: [employer]    Food and Nutrition Related History  Sleep times ~4 hrs each day, works 6 days a week night shift  6pm-9:15pm 3 days a week (when starts work at 10pm)    1pm-5pm 3 days a week (when starts at 6:45pm)    Food Recall  Breakfast: 8am (used to eat out frequently) eggs, cheese, toast, halal beef    Snack: skip   Lunch: skip  Snack: chips OR nutty jailyn's  Dinner: 4-5pm rice & beans OR steak, broccoli, corn, salad OR nachos  Snack: roasted honey peanuts    Beverages: 2 cups coffee w/ cream, will get medium caramel lattes or frappachinos from [employer] 4x/wk, reg soda 3x/wk, water 4-5 cups     Weekends: used to go to restaurant on weekend  Cravings: sweets   Trouble area of day: mornings     Frequency of Eating out: 3x/wk  Food restrictions: pork   Cooking: self   Food Shopping: self    Physical Activity Intake  Activity:walking at  work  Physical limitations/barriers to exercise: n/a    Estimated Needs  Energy  SECA: BMR:n/a      X 1.3 -1000 =  [employer] Needs: BMR : 1636   1-2# loss weekly sedentary:  963-1463             1-2# loss weekly lightly active:0697-9942  Maintenance calories for sedentary activity level: 1963  Protein:71-88      (1.2-1.5g/kg IBW)  Fluid: 68     (35mL/kg IBW)    Nutrition Diagnosis  Yes;    Overweight/obesity  related to Excess energy intake as evidenced by  BMI more than normative standard for age and sex (obesity-grade II 35-39.9)       Nutrition Intervention    Nutrition Prescription  Calories:1000  Protein:94  Fluid:68    Meal Plan (Roque/Pro/Carb)  Breakfast: 200/27  Snack:  Lunch: 600/40  Snack:  Dinner: 200/27  Snack:    Nutrition Education:    Calorie controlled menu  Lean protein food choices  Healthy snack options  Food journaling tips      Nutrition Counseling:  Strategies: meal planning, portion sizes, healthy snack choices, hydration, fiber intake, protein intake, exercise, food journal      Monitoring and Evaluation:  Evaluation criteria:  Energy Intake  Meet protein needs  Maintain adequate hydration  Monitor weekly weight  Meal planning/preparation  Food journal   Decreased portions at mealtimes and snacks  Physical activity     Barriers to learning:none  Readiness to change: Preparation:  (Getting ready to change)   Comprehension: good  Expected Compliance: good

## 2024-08-21 LAB
LAB AP GYN PRIMARY INTERPRETATION: NORMAL
Lab: NORMAL

## 2024-08-22 ENCOUNTER — TELEPHONE (OUTPATIENT)
Age: 54
End: 2024-08-22

## 2024-08-22 NOTE — TELEPHONE ENCOUNTER
Patient called to ask for the link to order shake [email or web address].

## 2024-08-23 NOTE — TELEPHONE ENCOUNTER
Patient called in because she was getting an error when pulling up website on phone. I did confirm with her the web address. I personally was able to pull up site on the computer, patient is gong to try on her home computer later and will let us know if she has any more issues.

## 2024-08-23 NOTE — PROGRESS NOTES
Weight Management Medical Nutrition Assessment    Vertio presented for a meal planning session 2/12 employee benefit. Today's weight is 218.1#, down 7.5# since initial. Verito has greatly reduced her sugar sweetened coffee beverages and cut out soda. She reports she has skipped 5 total protein shakes and/or have a meal instead since weekends are difficult with her work. She is very mindful to get enough water after she experienced leg cramps one day. Reinforced importance of not skipping protein shakes and continuing to get enough water. Will continue with partial replacement at this time. Provided 100-150 kathy snack list for snacks for her to chose from as needed. Product ordered and received today. F/u 2 weeks.      Patient seen by Medical Provider in past 6 months:  no  Requested to schedule appointment with Medical Provider: No      Anthropometric Measurements  Start Weight (#): 225.6 (8/14/24)  Current Weight (#): 218.1  TBW % Change from start weight: 3.3  Ideal Body Weight (#):128.5  Goal Weight (#):158  Highest: current 225.6  Lowest: 158 before she moved from [place]     Weight Loss History  Previous weight loss attempts: [employer]    Food and Nutrition Related History  Sleep times ~4 hrs each day, works 6 days a week night shift  6pm-9:15pm 3 days a week (when starts work at 10pm)    1pm-5pm 3 days a week (when starts at 6:45pm)    Food Recall  Breakfast: 8am Shake OR 2 egg whites, 1 toast, fruit OR keto pancake, PB, fruit OR skip   Snack: skip   Lunch: Protein shake  Snack: skip  Dinner: 4-5pm skinny pasta, sauce, meat, vegetables OR breaded cod, shrimp, garlic bread, salad OR tuna on salad  Snack: skip    Beverages: 6 oz coffee w/ SF cream, water 8 cups, sparkling water    Weekends: used to go to restaurant on weekend  Cravings: sweets   Trouble area of day: mornings     Frequency of Eating out: 3x/wk - reduced  Food restrictions: pork   Cooking: self   Food Shopping: self    Physical Activity  Intake  Activity:walking at work  Physical limitations/barriers to exercise: n/a    Estimated Needs  Energy  SECA: BMR:n/a      X 1.3 -1000 =  [employer] Needs: BMR : 1602   1-2# loss weekly sedentary:  922-1422            1-2# loss weekly lightly active:2819-9176  Maintenance calories for sedentary activity level: 1922  Protein:71-88      (1.2-1.5g/kg IBW)  Fluid: 68     (35mL/kg IBW)    Nutrition Diagnosis  Yes;    Overweight/obesity  related to Excess energy intake as evidenced by  BMI more than normative standard for age and sex (obesity-grade II 35-39.9)       Nutrition Intervention    Nutrition Prescription  Calories:1000  Protein:94  Fluid:68    Meal Plan (Roque/Pro/Carb)  Breakfast: 200/27  Snack:  Lunch: 600/40  Snack:  Dinner: 200/27  Snack:    Nutrition Education:    Calorie controlled menu  Lean protein food choices  Healthy snack options  Food journaling tips      Nutrition Counseling:  Strategies: meal planning, portion sizes, healthy snack choices, hydration, fiber intake, protein intake, exercise, food journal      Monitoring and Evaluation:  Evaluation criteria:  Energy Intake  Meet protein needs  Maintain adequate hydration  Monitor weekly weight  Meal planning/preparation  Food journal   Decreased portions at mealtimes and snacks  Physical activity     Barriers to learning:none  Readiness to change: Action:  (Changing behavior)  Comprehension: good  Expected Compliance: good

## 2024-08-29 ENCOUNTER — CLINICAL SUPPORT (OUTPATIENT)
Dept: BARIATRICS | Facility: CLINIC | Age: 54
End: 2024-08-29
Payer: COMMERCIAL

## 2024-08-29 VITALS — WEIGHT: 218.1 LBS | BODY MASS INDEX: 35.05 KG/M2 | HEIGHT: 66 IN

## 2024-08-29 DIAGNOSIS — E66.09 CLASS 2 OBESITY DUE TO EXCESS CALORIES WITH BODY MASS INDEX (BMI) OF 35.0 TO 35.9 IN ADULT: Primary | ICD-10-CM

## 2024-08-29 PROCEDURE — RECHECK

## 2024-08-29 PROCEDURE — S9470 NUTRITIONAL COUNSELING, DIET: HCPCS

## 2024-09-12 NOTE — PROGRESS NOTES
Weight Management Medical Nutrition Assessment    Verito presented for a meal planning session 3/12 employee benefit. Today's weight is 216.7#, down 1.4# x 2 weeks, down 8.9# total since initial. Is sleeping a bit more and is feeling better doing so. Reports 2 days she was eating more than usual which has reduced her rate of weight loss. Skipped a protein shake to have coffee 2x, recommended she mix protein shake into her coffee. Is starting to use [employer] soon. Desires to continue partial replacement at this time. Product ordered and received today. F/u 2 weeks.     Patient seen by Medical Provider in past 6 months:  no  Requested to schedule appointment with Medical Provider: No      Anthropometric Measurements  Start Weight (#): 225.6 (8/14/24)  Current Weight (#): 216.7  TBW % Change from start weight: 3.9  Ideal Body Weight (#):128.5  Goal Weight (#):158  Highest: current 225.6  Lowest: 158 before she moved from [place]     Weight Loss History  Previous weight loss attempts: [employer]    Food and Nutrition Related History  Sleep times ~4 hrs each day, works 6 days a week night shift  6pm-9:15pm 3 days a week (when starts work at 10pm)    1pm-5pm 3 days a week (when starts at 6:45pm)    Food Recall  Breakfast: 8am Shake   Snack: skip   Lunch: Shake  Snack: skip  Dinner: 4-5pm rice/beans, lean ground beef, salad  Snack: skip    Beverages: 6 oz coffee w/ SF cream, water 8 cups, sparkling water    Weekends: used to go to restaurant on weekend  Cravings: sweets   Trouble area of day: mornings     Frequency of Eating out: 3x/wk - reduced  Food restrictions: pork   Cooking: self   Food Shopping: self    Physical Activity Intake  Activity:walking at work  Physical limitations/barriers to exercise: n/a    Estimated Needs  Energy  SECA: BMR:n/a      X 1.3 -1000 =  [employer] Needs: BMR : 1602   1-2# loss weekly sedentary:  922-1422            1-2# loss weekly lightly active:5250-0475  Maintenance  calories for sedentary activity level: 1922  Protein:71-88      (1.2-1.5g/kg IBW)  Fluid: 68     (35mL/kg IBW)    Nutrition Diagnosis  Yes;    Overweight/obesity  related to Excess energy intake as evidenced by  BMI more than normative standard for age and sex (obesity-grade II 35-39.9)       Nutrition Intervention    Nutrition Prescription  Calories:1000  Protein:94  Fluid:68    Meal Plan (Roque/Pro/Carb)  Breakfast: 200/27  Snack:  Lunch: 600/40  Snack:  Dinner: 200/27  Snack:    Nutrition Education:    Calorie controlled menu  Lean protein food choices  Healthy snack options  Food journaling tips      Nutrition Counseling:  Strategies: meal planning, portion sizes, healthy snack choices, hydration, fiber intake, protein intake, exercise, food journal      Monitoring and Evaluation:  Evaluation criteria:  Energy Intake  Meet protein needs  Maintain adequate hydration  Monitor weekly weight  Meal planning/preparation  Food journal   Decreased portions at mealtimes and snacks  Physical activity     Barriers to learning:none  Readiness to change: Action:  (Changing behavior)  Comprehension: good  Expected Compliance: good

## 2024-09-13 ENCOUNTER — CLINICAL SUPPORT (OUTPATIENT)
Dept: BARIATRICS | Facility: CLINIC | Age: 54
End: 2024-09-13
Payer: COMMERCIAL

## 2024-09-13 VITALS — BODY MASS INDEX: 34.82 KG/M2 | WEIGHT: 216.7 LBS | HEIGHT: 66 IN

## 2024-09-13 DIAGNOSIS — E66.09 CLASS 2 OBESITY DUE TO EXCESS CALORIES WITH BODY MASS INDEX (BMI) OF 35.0 TO 35.9 IN ADULT: Primary | ICD-10-CM

## 2024-09-13 PROBLEM — Z01.419 ENCOUNTER FOR ANNUAL ROUTINE GYNECOLOGICAL EXAMINATION: Status: RESOLVED | Noted: 2024-08-14 | Resolved: 2024-09-13

## 2024-09-13 PROCEDURE — S9470 NUTRITIONAL COUNSELING, DIET: HCPCS

## 2024-09-13 PROCEDURE — RECHECK

## 2024-10-02 NOTE — PROGRESS NOTES
Weight Management Medical Nutrition Assessment    Verito presented for a meal planning session 4/12 employee benefit. Today's weight is 211.9#, down 13.7# since initial. Reports she has been doing well overall except getting two shakes a day at work was difficult on two occasions. Has adequate hydration. Desires to continue partial replacement at this time. Product ordered and received today. F/u 2 weeks.     Patient seen by Medical Provider in past 6 months:  no  Requested to schedule appointment with Medical Provider: No      Anthropometric Measurements  Start Weight (#): 225.6 (8/14/24)  Current Weight (#): 211.9  TBW % Change from start weight: 6.07  Ideal Body Weight (#):128.5  Goal Weight (#):158  Highest: current 225.6  Lowest: 158 before she moved from [place]     Weight Loss History  Previous weight loss attempts: [employer]    Food and Nutrition Related History  Sleep times ~4 hrs each day, works 6 days a week night shift  6pm-9:15pm 3 days a week (when starts work at 10pm)    1pm-5pm 3 days a week (when starts at 6:45pm)    Food Recall  Breakfast: 8am Shake   Snack: skip   Lunch: Shake  Snack: skip  Dinner: 4-5pm rice/beans, lean ground beef, salad OR turkey, salad OR tuna   Snack: skip    Beverages: 6 oz coffee w/ SF cream, water 8 cups, sparkling water    Weekends: used to go to restaurant on weekend  Cravings: sweets   Trouble area of day: mornings     Frequency of Eating out: 3x/wk - reduced  Food restrictions: pork   Cooking: self   Food Shopping: self    Physical Activity Intake  Activity:walking at work  Physical limitations/barriers to exercise: n/a    Estimated Needs  Energy  SECA: BMR:n/a      X 1.3 -1000 =  [employer] Needs: BMR : 1574   1-2# loss weekly sedentary:  889-1389           1-2# loss weekly lightly active:6173-6697  Maintenance calories for sedentary activity level: 1889  Protein:71-88      (1.2-1.5g/kg IBW)  Fluid: 68     (35mL/kg IBW)    Nutrition Diagnosis  Yes;     Overweight/obesity  related to Excess energy intake as evidenced by  BMI more than normative standard for age and sex (obesity-grade I 30-34.9)       Nutrition Intervention    Nutrition Prescription  Calories:1000  Protein:94  Fluid:68    Meal Plan (Roque/Pro/Carb)  Breakfast: 200/27  Snack:  Lunch: 600/40  Snack:  Dinner: 200/27  Snack:    Nutrition Education:    Calorie controlled menu  Lean protein food choices  Healthy snack options  Food journaling tips      Nutrition Counseling:  Strategies: meal planning, portion sizes, healthy snack choices, hydration, fiber intake, protein intake, exercise, food journal      Monitoring and Evaluation:  Evaluation criteria:  Energy Intake  Meet protein needs  Maintain adequate hydration  Monitor weekly weight  Meal planning/preparation  Food journal   Decreased portions at mealtimes and snacks  Physical activity     Barriers to learning:none  Readiness to change: Action:  (Changing behavior)  Comprehension: good  Expected Compliance: good

## 2024-10-04 ENCOUNTER — CLINICAL SUPPORT (OUTPATIENT)
Dept: BARIATRICS | Facility: CLINIC | Age: 54
End: 2024-10-04
Payer: COMMERCIAL

## 2024-10-04 VITALS — WEIGHT: 211.9 LBS | HEIGHT: 66 IN | BODY MASS INDEX: 34.06 KG/M2

## 2024-10-04 DIAGNOSIS — E66.811 CLASS 1 OBESITY DUE TO EXCESS CALORIES WITH BODY MASS INDEX (BMI) OF 34.0 TO 34.9 IN ADULT: Primary | ICD-10-CM

## 2024-10-04 DIAGNOSIS — E66.09 CLASS 1 OBESITY DUE TO EXCESS CALORIES WITH BODY MASS INDEX (BMI) OF 34.0 TO 34.9 IN ADULT: Primary | ICD-10-CM

## 2024-10-04 PROCEDURE — S9470 NUTRITIONAL COUNSELING, DIET: HCPCS

## 2024-10-04 PROCEDURE — RECHECK

## 2024-10-09 NOTE — PROGRESS NOTES
Weight Management Medical Nutrition Assessment    Verito presented for a meal planning session 5/12 employee benefit. Today's weight is 208.1#, down 17.5# since initial. Challenge is skipping 1 protein shake and drinking enough fluids on weekends due to her work schedule, reports she is only getting 5 cups water those days. Discussed barriers to getting 2 shakes a day and getting adequate hydration on weekends. Emphasized importance of getting enough hydration and not skipping her shakes while doing partial replacement. Desires to continue partial replacement at this time. Product ordered and received today. F/u 3 weeks.     Patient seen by Medical Provider in past 6 months:  no  Requested to schedule appointment with Medical Provider: No      Anthropometric Measurements  Start Weight (#): 225.6 (8/14/24)  Current Weight (#): 208.1  TBW % Change from start weight: 7.8  Ideal Body Weight (#):128.5  Goal Weight (#):158  Highest: current 225.6  Lowest: 158 before she moved from [place]     Weight Loss History  Previous weight loss attempts: [facility]    Food and Nutrition Related History  Sleep times ~4 hrs each day, works 6 days a week night shift  6pm-9:15pm 3 days a week (when starts work at 10pm)    1pm-5pm 3 days a week (when starts at 6:45pm)    Food Recall  Breakfast: 8am Shake   Snack: skip   Lunch: Shake  Snack: skip  Dinner: 4-5pm rice/beans, lean ground beef, salad OR turkey, salad OR tuna   Snack: skip    Beverages: 6 oz coffee w/ SF cream, water 8 cups, sparkling water    Weekends: used to go to restaurant on weekend  Cravings: sweets   Trouble area of day: mornings     Frequency of Eating out: 3x/wk - reduced  Food restrictions: pork   Cooking: self   Food Shopping: self    Physical Activity Intake  Activity:walking at work  Physical limitations/barriers to exercise: n/a    Estimated Needs  Energy  SECA: BMR:n/a      X 1.3 -1000 =  [employer] Needs: BMR : 1574   1-2# loss weekly sedentary:  267-8936            1-2# loss weekly lightly active:1335-5832  Maintenance calories for sedentary activity level: 1889  Protein:71-88      (1.2-1.5g/kg IBW)  Fluid: 68     (35mL/kg IBW)    Nutrition Diagnosis  Yes;    Overweight/obesity  related to Excess energy intake as evidenced by  BMI more than normative standard for age and sex (obesity-grade I 30-34.9)       Nutrition Intervention    Nutrition Prescription  Calories:1000  Protein:94  Fluid:68    Meal Plan (Roque/Pro/Carb)  Breakfast: 200/27  Snack:  Lunch: 600/40  Snack:  Dinner: 200/27  Snack:    Nutrition Education:    Calorie controlled menu  Lean protein food choices  Healthy snack options  Food journaling tips      Nutrition Counseling:  Strategies: meal planning, portion sizes, healthy snack choices, hydration, fiber intake, protein intake, exercise, food journal      Monitoring and Evaluation:  Evaluation criteria:  Energy Intake  Meet protein needs  Maintain adequate hydration  Monitor weekly weight  Meal planning/preparation  Food journal   Decreased portions at mealtimes and snacks  Physical activity     Barriers to learning:none  Readiness to change: Action:  (Changing behavior)  Comprehension: good  Expected Compliance: good

## 2024-10-16 ENCOUNTER — CLINICAL SUPPORT (OUTPATIENT)
Dept: BARIATRICS | Facility: CLINIC | Age: 54
End: 2024-10-16
Payer: COMMERCIAL

## 2024-10-16 VITALS — HEIGHT: 66 IN | BODY MASS INDEX: 33.44 KG/M2 | WEIGHT: 208.1 LBS

## 2024-10-16 DIAGNOSIS — E66.811 CLASS 1 OBESITY DUE TO EXCESS CALORIES WITHOUT SERIOUS COMORBIDITY WITH BODY MASS INDEX (BMI) OF 33.0 TO 33.9 IN ADULT: Primary | ICD-10-CM

## 2024-10-16 DIAGNOSIS — E66.09 CLASS 1 OBESITY DUE TO EXCESS CALORIES WITHOUT SERIOUS COMORBIDITY WITH BODY MASS INDEX (BMI) OF 33.0 TO 33.9 IN ADULT: Primary | ICD-10-CM

## 2024-10-16 PROCEDURE — RECHECK

## 2024-10-16 PROCEDURE — S9470 NUTRITIONAL COUNSELING, DIET: HCPCS

## 2024-11-04 ENCOUNTER — TELEPHONE (OUTPATIENT)
Dept: BARIATRICS | Facility: CLINIC | Age: 54
End: 2024-11-04

## 2025-01-21 ENCOUNTER — APPOINTMENT (OUTPATIENT)
Dept: RADIOLOGY | Facility: MEDICAL CENTER | Age: 55
End: 2025-01-21
Payer: COMMERCIAL

## 2025-01-21 ENCOUNTER — APPOINTMENT (OUTPATIENT)
Dept: LAB | Facility: CLINIC | Age: 55
End: 2025-01-21
Payer: COMMERCIAL

## 2025-01-21 ENCOUNTER — OFFICE VISIT (OUTPATIENT)
Dept: FAMILY MEDICINE CLINIC | Facility: CLINIC | Age: 55
End: 2025-01-21
Payer: COMMERCIAL

## 2025-01-21 VITALS
TEMPERATURE: 97.9 F | OXYGEN SATURATION: 98 % | HEIGHT: 66 IN | HEART RATE: 83 BPM | WEIGHT: 213.2 LBS | SYSTOLIC BLOOD PRESSURE: 122 MMHG | DIASTOLIC BLOOD PRESSURE: 88 MMHG | BODY MASS INDEX: 34.27 KG/M2

## 2025-01-21 DIAGNOSIS — E78.2 MIXED HYPERLIPIDEMIA: ICD-10-CM

## 2025-01-21 DIAGNOSIS — M25.511 CHRONIC RIGHT SHOULDER PAIN: ICD-10-CM

## 2025-01-21 DIAGNOSIS — Z13.6 SCREENING FOR CARDIOVASCULAR CONDITION: ICD-10-CM

## 2025-01-21 DIAGNOSIS — Z00.00 ANNUAL PHYSICAL EXAM: Primary | ICD-10-CM

## 2025-01-21 DIAGNOSIS — G89.29 CHRONIC RIGHT SHOULDER PAIN: ICD-10-CM

## 2025-01-21 DIAGNOSIS — R03.0 ELEVATED BLOOD PRESSURE READING: ICD-10-CM

## 2025-01-21 DIAGNOSIS — Z12.31 ENCOUNTER FOR SCREENING MAMMOGRAM FOR BREAST CANCER: ICD-10-CM

## 2025-01-21 PROCEDURE — 99214 OFFICE O/P EST MOD 30 MIN: CPT | Performed by: FAMILY MEDICINE

## 2025-01-21 PROCEDURE — 73030 X-RAY EXAM OF SHOULDER: CPT

## 2025-01-21 PROCEDURE — 99396 PREV VISIT EST AGE 40-64: CPT | Performed by: FAMILY MEDICINE

## 2025-01-21 NOTE — PATIENT INSTRUCTIONS
"Patient Education     Routine physical for adults   The Basics   Written by the doctors and editors at [facility]   What is a physical? -- A physical is a routine visit, or \"check-up,\" with your doctor. You might also hear it called a \"wellness visit\" or \"preventive visit.\"  During each visit, the doctor will:   Ask about your physical and mental health   Ask about your habits, behaviors, and lifestyle   Do an exam   Give you vaccines if needed   Talk to you about any medicines you take   Give advice about your health   Answer your questions  Getting regular check-ups is an important part of taking care of your health. It can help your doctor find and treat any problems you have. But it's also important for preventing health problems.  A routine physical is different from a \"sick visit.\" A sick visit is when you see a doctor because of a health concern or problem. Since physicals are scheduled ahead of time, you can think about what you want to ask the doctor.  How often should I get a physical? -- It depends on your age and health. In general, for people age 21 years and older:   If you are younger than 50 years, you might be able to get a physical every 3 years.   If you are 50 years or older, your doctor might recommend a physical every year.  If you have an ongoing health condition, like diabetes or high blood pressure, your doctor will probably want to see you more often.  What happens during a physical? -- In general, each visit will include:   Physical exam - The doctor or nurse will check your height, weight, heart rate, and blood pressure. They will also look at your eyes and ears. They will ask about how you are feeling and whether you have any symptoms that bother you.   Medicines - It's a good idea to bring a list of all the medicines you take to each doctor visit. Your doctor will talk to you about your medicines and answer any questions. Tell them if you are having any side effects that bother you. You " "should also tell them if you are having trouble paying for any of your medicines.   Habits and behaviors - This includes:   Your diet   Your exercise habits   Whether you smoke, drink alcohol, or use drugs   Whether you are sexually active   Whether you feel safe at home  Your doctor will talk to you about things you can do to improve your health and lower your risk of health problems. They will also offer help and support. For example, if you want to quit smoking, they can give you advice and might prescribe medicines. If you want to improve your diet or get more physical activity, they can help you with this, too.   Lab tests, if needed - The tests you get will depend on your age and situation. For example, your doctor might want to check your:   Cholesterol   Blood sugar   Iron level   Vaccines - The recommended vaccines will depend on your age, health, and what vaccines you already had. Vaccines are very important because they can prevent certain serious or deadly infections.   Discussion of screening - \"Screening\" means checking for diseases or other health problems before they cause symptoms. Your doctor can recommend screening based on your age, risk, and preferences. This might include tests to check for:   Cancer, such as breast, prostate, cervical, ovarian, colorectal, prostate, lung, or skin cancer   Sexually transmitted infections, such as chlamydia and gonorrhea   Mental health conditions like depression and anxiety  Your doctor will talk to you about the different types of screening tests. They can help you decide which screenings to have. They can also explain what the results might mean.   Answering questions - The physical is a good time to ask the doctor or nurse questions about your health. If needed, they can refer you to other doctors or specialists, too.  Adults older than 65 years often need other care, too. As you get older, your doctor will talk to you about:   How to prevent falling at " home   Hearing or vision tests   Memory testing   How to take your medicines safely   Making sure that you have the help and support you need at home  All topics are updated as new evidence becomes available and our peer review process is complete.  This topic retrieved from [employer] on: May 02, 2024.  Topic 615309 Version 1.0  Release: 32.4.3 - C32.122  © 2024 UpToDate, Inc. and/or its affiliates. All rights reserved.  Consumer Information Use and Disclaimer   Disclaimer: This generalized information is a limited summary of diagnosis, treatment, and/or medication information. It is not meant to be comprehensive and should be used as a tool to help the user understand and/or assess potential diagnostic and treatment options. It does NOT include all information about conditions, treatments, medications, side effects, or risks that may apply to a specific patient. It is not intended to be medical advice or a substitute for the medical advice, diagnosis, or treatment of a health care provider based on the health care provider's examination and assessment of a patient's specific and unique circumstances. Patients must speak with a health care provider for complete information about their health, medical questions, and treatment options, including any risks or benefits regarding use of medications. This information does not endorse any treatments or medications as safe, effective, or approved for treating a specific patient. UpToDate, Inc. and its affiliates disclaim any warranty or liability relating to this information or the use thereof.The use of this information is governed by the Terms of Use, available at https://www.woltersMyFabuwer.com/en/know/clinical-effectiveness-terms. 2024© UpToDate, Inc. and its affiliates and/or licensors. All rights reserved.  Copyright   © 2024 UpToDate, Inc. and/or its affiliates. All rights reserved.

## 2025-01-21 NOTE — PROGRESS NOTES
Name: Verito Weathers      : 1970      MRN: [id number]  Encounter Provider: Gabrielle Stock MD  Encounter Date: 2025   Encounter department: [facility]  :  Assessment & Plan  Annual physical exam  Health maintenance reviewed  Orders:    Lipid panel; Future    CBC and differential; Future    Comprehensive metabolic panel; Future    TSH, 3rd generation with Free T4 reflex; Future    Chronic right shoulder pain  Ongoing since being ill with possibly COVID  Orders:    XR shoulder 2+ vw right; Future    Lipid panel; Future    CBC and differential; Future    Comprehensive metabolic panel; Future    TSH, 3rd generation with Free T4 reflex; Future    Mixed hyperlipidemia    Orders:    Lipid panel; Future    CBC and differential; Future    Comprehensive metabolic panel; Future    TSH, 3rd generation with Free T4 reflex; Future    Elevated blood pressure reading  Has home monitor and has had elevated readings of 150s/80s when rushing around  BP good today  Will check in 2-3 weeks here with machine and manual BP  Orders:    Lipid panel; Future    CBC and differential; Future    Comprehensive metabolic panel; Future    TSH, 3rd generation with Free T4 reflex; Future    Screening for cardiovascular condition    Orders:    Lipid panel; Future    CBC and differential; Future    Comprehensive metabolic panel; Future    TSH, 3rd generation with Free T4 reflex; Future    Encounter for screening mammogram for breast cancer    Orders:    Mammo screening bilateral w 3d and cad; Future    Lipid panel; Future    CBC and differential; Future    Comprehensive metabolic panel; Future    TSH, 3rd generation with Free T4 reflex; Future           History of Present Illness   HPI  54yof here for well exam  Doing well  Health maintenance reviewed, mammogram due soon- order placed  Had flu shot  Mother passed away several months ago  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  Negative for chills and fever.   HENT: Negative.  Negative for ear pain and sore throat.    Eyes:  Negative for pain and visual disturbance.   Respiratory: Negative.  Negative for cough and shortness of breath.    Cardiovascular: Negative.  Negative for chest pain and palpitations.   Gastrointestinal: Negative.  Negative for abdominal pain and vomiting.   Genitourinary: Negative.  Negative for dysuria and hematuria.   Musculoskeletal:  Negative for arthralgias and back pain.        Right shoulder pain   Skin:  Negative for color change and rash.   Neurological: Negative.  Negative for seizures and syncope.   Psychiatric/Behavioral:  Positive for sleep disturbance.    All other systems reviewed and are negative.      Objective   /88 (BP Location: Left arm, Patient Position: Sitting, Cuff Size: Standard)   Pulse 83   Temp 97.9 °F (36.6 °C) (Temporal)   Ht 5' 5.75\" (1.67 m)   Wt 96.7 kg (213 lb 3.2 oz)   LMP 10/02/2017   SpO2 98%   BMI 34.67 kg/m²      Physical Exam  Vitals and nursing note reviewed.   Constitutional:       General: She is not in acute distress.     Appearance: Normal appearance. She is well-developed.   HENT:      Head: Normocephalic and atraumatic.      Right Ear: Tympanic membrane normal.      Left Ear: Tympanic membrane normal.      Nose: Nose normal.      Mouth/Throat:      Mouth: Mucous membranes are moist.   Eyes:      Extraocular Movements: Extraocular movements intact.      Conjunctiva/sclera: Conjunctivae normal.      Pupils: Pupils are equal, " round, and reactive to light.   Cardiovascular:      Rate and Rhythm: Normal rate and regular rhythm.      Pulses: Normal pulses.      Heart sounds: Normal heart sounds. No murmur heard.  Pulmonary:      Effort: Pulmonary effort is normal. No respiratory distress.      Breath sounds: Normal breath sounds.   Abdominal:      Palpations: Abdomen is soft.      Tenderness: There is no abdominal tenderness.   Musculoskeletal:         General: Tenderness present. No swelling.      Cervical back: Normal range of motion and neck supple.      Comments: Pain with movements against resisitance, slightly decreased strength secondary to pain   Skin:     General: Skin is warm and dry.      Capillary Refill: Capillary refill takes less than 2 seconds.   Neurological:      General: No focal deficit present.      Mental Status: She is alert and oriented to person, place, and time.   Psychiatric:         Mood and Affect: Mood normal.         Behavior: Behavior normal.

## 2025-01-21 NOTE — ASSESSMENT & PLAN NOTE
Orders:    Lipid panel; Future    CBC and differential; Future    Comprehensive metabolic panel; Future    TSH, 3rd generation with Free T4 reflex; Future

## 2025-01-28 ENCOUNTER — RESULTS FOLLOW-UP (OUTPATIENT)
Dept: FAMILY MEDICINE CLINIC | Facility: CLINIC | Age: 55
End: 2025-01-28

## 2025-02-05 ENCOUNTER — APPOINTMENT (OUTPATIENT)
Dept: LAB | Age: 55
End: 2025-02-05
Payer: COMMERCIAL

## 2025-02-05 DIAGNOSIS — Z12.31 ENCOUNTER FOR SCREENING MAMMOGRAM FOR BREAST CANCER: ICD-10-CM

## 2025-02-05 DIAGNOSIS — M25.511 CHRONIC RIGHT SHOULDER PAIN: ICD-10-CM

## 2025-02-05 DIAGNOSIS — E78.2 MIXED HYPERLIPIDEMIA: ICD-10-CM

## 2025-02-05 DIAGNOSIS — R03.0 ELEVATED BLOOD PRESSURE READING: ICD-10-CM

## 2025-02-05 DIAGNOSIS — Z00.00 ANNUAL PHYSICAL EXAM: ICD-10-CM

## 2025-02-05 DIAGNOSIS — G89.29 CHRONIC RIGHT SHOULDER PAIN: ICD-10-CM

## 2025-02-05 DIAGNOSIS — Z13.6 SCREENING FOR CARDIOVASCULAR CONDITION: ICD-10-CM

## 2025-02-05 LAB
ALBUMIN SERPL BCG-MCNC: 4.7 G/DL (ref 3.5–5)
ALP SERPL-CCNC: 107 U/L (ref 34–104)
ALT SERPL W P-5'-P-CCNC: 27 U/L (ref 7–52)
ANION GAP SERPL CALCULATED.3IONS-SCNC: 11 MMOL/L (ref 4–13)
AST SERPL W P-5'-P-CCNC: 22 U/L (ref 13–39)
BASOPHILS # BLD AUTO: 0.03 THOUSANDS/ΜL (ref 0–0.1)
BASOPHILS NFR BLD AUTO: 1 % (ref 0–1)
BILIRUB SERPL-MCNC: 0.38 MG/DL (ref 0.2–1)
BUN SERPL-MCNC: 13 MG/DL (ref 5–25)
CALCIUM SERPL-MCNC: 9.6 MG/DL (ref 8.4–10.2)
CHLORIDE SERPL-SCNC: 101 MMOL/L (ref 96–108)
CHOLEST SERPL-MCNC: 195 MG/DL (ref ?–200)
CO2 SERPL-SCNC: 30 MMOL/L (ref 21–32)
CREAT SERPL-MCNC: 0.84 MG/DL (ref 0.6–1.3)
EOSINOPHIL # BLD AUTO: 0.11 THOUSAND/ΜL (ref 0–0.61)
EOSINOPHIL NFR BLD AUTO: 2 % (ref 0–6)
ERYTHROCYTE [DISTWIDTH] IN BLOOD BY AUTOMATED COUNT: 12.7 % (ref 11.6–15.1)
GFR SERPL CREATININE-BSD FRML MDRD: 79 ML/MIN/1.73SQ M
GLUCOSE P FAST SERPL-MCNC: 87 MG/DL (ref 65–99)
HCT VFR BLD AUTO: 41.5 % (ref 34.8–46.1)
HDLC SERPL-MCNC: 62 MG/DL
HGB BLD-MCNC: 12.9 G/DL (ref 11.5–15.4)
IMM GRANULOCYTES # BLD AUTO: 0.01 THOUSAND/UL (ref 0–0.2)
IMM GRANULOCYTES NFR BLD AUTO: 0 % (ref 0–2)
LDLC SERPL CALC-MCNC: 117 MG/DL (ref 0–100)
LYMPHOCYTES # BLD AUTO: 1.42 THOUSANDS/ΜL (ref 0.6–4.47)
LYMPHOCYTES NFR BLD AUTO: 31 % (ref 14–44)
MCH RBC QN AUTO: 30.9 PG (ref 26.8–34.3)
MCHC RBC AUTO-ENTMCNC: 31.1 G/DL (ref 31.4–37.4)
MCV RBC AUTO: 99 FL (ref 82–98)
MONOCYTES # BLD AUTO: 0.21 THOUSAND/ΜL (ref 0.17–1.22)
MONOCYTES NFR BLD AUTO: 5 % (ref 4–12)
NEUTROPHILS # BLD AUTO: 2.8 THOUSANDS/ΜL (ref 1.85–7.62)
NEUTS SEG NFR BLD AUTO: 61 % (ref 43–75)
NONHDLC SERPL-MCNC: 133 MG/DL
NRBC BLD AUTO-RTO: 0 /100 WBCS
PLATELET # BLD AUTO: 267 THOUSANDS/UL (ref 149–390)
PMV BLD AUTO: 11.1 FL (ref 8.9–12.7)
POTASSIUM SERPL-SCNC: 4.4 MMOL/L (ref 3.5–5.3)
PROT SERPL-MCNC: 7.5 G/DL (ref 6.4–8.4)
RBC # BLD AUTO: 4.18 MILLION/UL (ref 3.81–5.12)
SODIUM SERPL-SCNC: 142 MMOL/L (ref 135–147)
TRIGL SERPL-MCNC: 78 MG/DL (ref ?–150)
TSH SERPL DL<=0.05 MIU/L-ACNC: 1.5 UIU/ML (ref 0.45–4.5)
WBC # BLD AUTO: 4.58 THOUSAND/UL (ref 4.31–10.16)

## 2025-02-05 PROCEDURE — 80053 COMPREHEN METABOLIC PANEL: CPT

## 2025-02-05 PROCEDURE — 80061 LIPID PANEL: CPT

## 2025-02-05 PROCEDURE — 84443 ASSAY THYROID STIM HORMONE: CPT

## 2025-02-05 PROCEDURE — 85025 COMPLETE CBC W/AUTO DIFF WBC: CPT

## 2025-02-05 PROCEDURE — 36415 COLL VENOUS BLD VENIPUNCTURE: CPT

## 2025-02-10 ENCOUNTER — HOSPITAL ENCOUNTER (OUTPATIENT)
Dept: MAMMOGRAPHY | Facility: CLINIC | Age: 55
Discharge: HOME | End: 2025-02-10
Payer: COMMERCIAL

## 2025-02-10 VITALS — WEIGHT: 213 LBS | BODY MASS INDEX: 32.28 KG/M2 | HEIGHT: 68 IN

## 2025-02-10 DIAGNOSIS — Z12.31 ENCOUNTER FOR SCREENING MAMMOGRAM FOR BREAST CANCER: ICD-10-CM

## 2025-02-10 PROCEDURE — 77063 BREAST TOMOSYNTHESIS BI: CPT

## 2025-02-10 PROCEDURE — 77067 SCR MAMMO BI INCL CAD: CPT

## 2025-02-11 ENCOUNTER — OFFICE VISIT (OUTPATIENT)
Dept: FAMILY MEDICINE CLINIC | Facility: CLINIC | Age: 55
End: 2025-02-11
Payer: COMMERCIAL

## 2025-02-11 VITALS
SYSTOLIC BLOOD PRESSURE: 118 MMHG | HEIGHT: 68 IN | OXYGEN SATURATION: 99 % | WEIGHT: 212 LBS | HEART RATE: 69 BPM | TEMPERATURE: 97.4 F | BODY MASS INDEX: 32.13 KG/M2 | DIASTOLIC BLOOD PRESSURE: 78 MMHG

## 2025-02-11 DIAGNOSIS — M25.511 CHRONIC RIGHT SHOULDER PAIN: Primary | ICD-10-CM

## 2025-02-11 DIAGNOSIS — R03.0 ELEVATED BLOOD PRESSURE READING: ICD-10-CM

## 2025-02-11 DIAGNOSIS — G89.29 CHRONIC RIGHT SHOULDER PAIN: Primary | ICD-10-CM

## 2025-02-11 DIAGNOSIS — Z20.828 EXPOSURE TO THE FLU: ICD-10-CM

## 2025-02-11 PROCEDURE — 99214 OFFICE O/P EST MOD 30 MIN: CPT | Performed by: FAMILY MEDICINE

## 2025-02-11 RX ORDER — OSELTAMIVIR PHOSPHATE 75 MG/1
75 CAPSULE ORAL DAILY
Qty: 7 CAPSULE | Refills: 0 | Status: SHIPPED | OUTPATIENT
Start: 2025-02-11 | End: 2025-02-18

## 2025-02-11 NOTE — PROGRESS NOTES
"Name: Verito Weathers      : 1970      MRN: [id number]  Encounter Provider: Gabrielle Stock MD  Encounter Date: 2025   Encounter department: [facility]  :  Assessment & Plan  Chronic right shoulder pain  Xray normal  Ref to PT  Orders:    Ambulatory Referral to Physical Therapy; Future    Exposure to the flu    Orders:    oseltamivir (TAMIFLU) 75 mg capsule; Take 1 capsule (75 mg total) by mouth daily for 7 days    Elevated blood pressure reading  BP normal              History of Present Illness   HPI  54-year-old female here for follow-up on her blood pressure.  Blood pressure is normal.  Most readings in the office have been normal.  She was using a machine which showed elevated blood pressure.  Blood pressure is not measuring correctly today.  Patient also continues to have shoulder pain.  X-ray was normal.  Recommend doing some physical therapy.  Patient was exposed to flu at work.  She is requesting Tamiflu for prophylaxis.  Will see back in 6-7 months  Review of Systems   Constitutional: Negative.  Negative for chills and fever.   HENT: Negative.  Negative for ear pain and sore throat.    Eyes:  Negative for pain and visual disturbance.   Respiratory: Negative.  Negative for cough and shortness of breath.    Cardiovascular: Negative.  Negative for chest pain and palpitations.   Gastrointestinal: Negative.  Negative for abdominal pain and vomiting.   Genitourinary: Negative.  Negative for dysuria and hematuria.   Musculoskeletal:  Negative for arthralgias and back pain.   Skin:  Negative for color change and rash.   Neurological: Negative.  Negative for seizures and syncope.   Psychiatric/Behavioral: Negative.     All other systems reviewed and are negative.      Objective   /78 (BP Location: Left arm, Patient Position: Sitting, Cuff Size: Large)   Pulse 69   Temp (!) 97.4 °F (36.3 °C) (Temporal)   Ht 5' 7.5\" (1.715 m)   Wt 96.2 kg (212 lb)   LMP 10/02/2017   SpO2 99%  "  BMI 32.71 kg/m²      Physical Exam  Vitals and nursing note reviewed.   Constitutional:       Appearance: Normal appearance.   HENT:      Head: Normocephalic.   Cardiovascular:      Rate and Rhythm: Normal rate and regular rhythm.      Pulses: Normal pulses.      Heart sounds: Normal heart sounds.   Pulmonary:      Effort: Pulmonary effort is normal. No respiratory distress.      Breath sounds: Normal breath sounds.   Musculoskeletal:         General: Tenderness present.      Cervical back: Neck supple.      Comments: Tenderness of shoulder with ROM and movement against resistance   Neurological:      General: No focal deficit present.      Mental Status: She is alert and oriented to person, place, and time.   Psychiatric:         Mood and Affect: Mood normal.         Behavior: Behavior normal.

## 2025-02-20 ENCOUNTER — EVALUATION (OUTPATIENT)
Dept: PHYSICAL THERAPY | Facility: REHABILITATION | Age: 55
End: 2025-02-20
Payer: COMMERCIAL

## 2025-02-20 DIAGNOSIS — M54.12 CERVICAL RADICULOPATHY: ICD-10-CM

## 2025-02-20 DIAGNOSIS — G89.29 CHRONIC RIGHT SHOULDER PAIN: Primary | ICD-10-CM

## 2025-02-20 DIAGNOSIS — M25.511 CHRONIC RIGHT SHOULDER PAIN: Primary | ICD-10-CM

## 2025-02-20 PROCEDURE — 97535 SELF CARE MNGMENT TRAINING: CPT | Performed by: PHYSICAL THERAPIST

## 2025-02-20 PROCEDURE — 97161 PT EVAL LOW COMPLEX 20 MIN: CPT | Performed by: PHYSICAL THERAPIST

## 2025-02-20 PROCEDURE — 97110 THERAPEUTIC EXERCISES: CPT | Performed by: PHYSICAL THERAPIST

## 2025-02-20 NOTE — PROGRESS NOTES
PT Evaluation     Today's date: 2025  Patient name: Verito Weathers  : 1970  MRN: [id number]  Referring provider: Gabrielle Stock MD  Dx:   Encounter Diagnosis     ICD-10-CM    1. Chronic right shoulder pain  M25.511 Ambulatory Referral to Physical Therapy    G89.29           Start Time: 0815  Stop Time: 0900  Total time in clinic (min): 45 minutes    Assessment  Impairments: abnormal or restricted ROM, impaired physical strength, lacks appropriate home exercise program, pain with function and activity limitations    Assessment details: Verito Weathers presents to outpatient physical with complaints of acute on chronic R shoulder pain as well as mechanical s/s suggestive of cervical radiculopathy. Pt presents with limited cervical and R shoulder ROM, hypomobility of C4-7, strong but painful R shoulder MMT, (+) R ULNTT, decreased R scapular strength  leading to activity intolerance.     Based on patient's age and motivation, patient is a positive candidate to respond favorably to physical therapy with a good prognosis. Patient was educated on examination and techniques, plan of care, goals of therapy, and HEP. Patient was provided an opportunity to ask any questions. Provided Pt initial HEP. Patient demonstrated and verbalized understanding. Verbally reported to hold exercises if increased pain or discomfort. Pt will be seen 1-2x/week for 6-12 weeks. Patient will be re-assessed regularly in order to document progress and limit any regression. Thank you for this referral.    Understanding of Dx/Px/POC: good     Prognosis: good    Goals  ST. Pt will improve R shoulder ROM by 25% in 4 weeks  2. Pt will improve R shoulder MMT by 1/2 grade in 4 weeks  3. Pt will report reduction in pain by 2 points in 4 weeks  4. Pt will be able to reach overhead with pain at worst of 2/10 in 4 weeks  5. Pt will be independent with initial HEP in 4 weeks    LT. Pt will restore R shoulder ROM to WFL by discharge  2. Pt  will improve R shoulder MMT to 5/5 by discharge  3. Pt will improve FOTO score to indicated measure by discharge  4. Pt will be able to perform occupational tasks without pain by discharge    Plan  Patient would benefit from: PT eval and skilled physical therapy    Planned therapy interventions: joint mobilization, manual therapy, neuromuscular re-education, patient/caregiver education, self care, strengthening, stretching, therapeutic activities, therapeutic exercise, home exercise program, graded exercise, graded activity, flexibility and functional ROM exercises    Frequency: 2x week  Duration in weeks: 8  Treatment plan discussed with: patient        Subjective Evaluation    History of Present Illness  Mechanism of injury: Verito Weathers is 54 y.o. female who is RHD that presents to outpatient physical therapy stating that she had COVID-19 in 2024 and she noted every joint hurt in her body. When she began to recover, she noted her R shoulder continued to be painful. She has been using TENS machine with mild relief. She reports that her symptoms radiate into R forearm and terminates into R first digit. 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 She does note difficulty following physical activity. She had a follow-up with PCP and she performed MMT on R shoulder and patient notes ever since that incident, there has been an increase in pain of R shoulder. Pt denies neck pain, N/T into UEs, diplopia, dysarthria, dysphagia, drop attacks, nystagmus, nausea or vomiting, fever, chills. Does note SOB but she is recovering from flu.   Quality of life: good    Patient Goals  Patient goals for therapy: increased strength, decreased pain, increased motion, independence with ADLs/IADLs and return to sport/leisure activities    Pain  Current pain ratin  At worst pain ratin  Location: Right shoulder  Quality: radiating, sharp, tight and dull ache    Hand  dominance: right      Diagnostic Tests  X-ray: normal    FCE comments: Right shoulder x-ray:  There is no acute fracture or dislocation.  No significant degenerative changes.  No lytic or blastic osseous lesion.  Soft tissues are unremarkable.  Treatments  No previous or current treatments        Objective     Concurrent Complaints  Negative for night pain, disturbed sleep, dizziness, faints, headaches, nausea/motion sickness, tinnitus, trouble swallowing, difficulty breathing, shortness of breath, visual change, history of cancer and history of trauma    Active Range of Motion   Cervical/Thoracic Spine       Cervical    Flexion:  WFL  Extension:  WFL  Left lateral flexion:  WFL  Right lateral flexion:  WFL  Left rotation:  WFL  Right rotation:  WFL  Left Shoulder   Normal active range of motion    Right Shoulder   Flexion: 170 degrees   Abduction: 145 degrees with pain  External rotation BTH: WFL and with pain  Internal rotation BTB: WFL and with pain    Passive Range of Motion     Right Shoulder   Flexion: 170 degrees   Abduction: 160 degrees   External rotation 0°: WFL  External rotation 90°: WFL  Internal rotation 90°: 35 degrees     Joint Play     Right Shoulder  Hypomobile in the posterior capsule and inferior capsule.     Strength/Myotome Testing   Cervical Spine     Left   Normal strength    Right Shoulder     Planes of Motion   Flexion: 4+   Extension: 4   Abduction: 4+   Adduction: 5   External rotation at 0°: 5   Internal rotation at 0°: 5     Right Elbow   Flexion: 5  Extension: 5    Tests     Right Shoulder   Positive ULTT1.   Neuro Exam:     Headaches   Patient reports headaches: No.       Flowsheet Rows      Flowsheet Row Most Recent Value   PT/OT G-Codes    Current Score 71   Projected Score 71                 Precautions:  Patient Active Problem List   Diagnosis    Mixed hyperlipidemia    Screening for HIV (human immunodeficiency virus)    Class 1 obesity without serious comorbidity with body mass  "index (BMI) of 30.0 to 30.9 in adult    LFTs abnormal    Abnormal renal function    High gamma glutamyl transferase (GGT)    Increased MCV    Obesity (BMI 30-39.9)    Leukopenia       Patient's Goals:    Date 2/20            Visits 1 2 3 4 5 6 7 8 9 10   Visits approved              FOTO comp            Eval/Re-eval IE                         Education             HEP/POC MATEUS            Manuals                                                    Ther Ex             Pulleys              UBE             4-way ISO 5\"x10ea            UT stretch 10\"x10            Wall slides Flex/scap 5\"x10            Table slides  Scap/ER 5\"x10                                                                Neuro Re-ed             Ther Activity                                       Gait Training                                       Modalities                                       Access Code: [email or web address]  URL: [email or web address]  Date: 02/20/2025  Prepared by: Siri Thomas    Exercises  - Shoulder Flexion Wall Slide with Towel  - 1 x daily - 7 x weekly - 3 sets - 10 reps  - Scaption Wall Slide with Towel  - 1 x daily - 7 x weekly - 3 sets - 10 reps  - Standing Isometric Shoulder Internal Rotation at Doorway  - 1 x daily - 7 x weekly - 3 sets - 10 reps  - Isometric Shoulder Flexion at Wall  - 1 x daily - 7 x weekly - 3 sets - 10 reps  - Isometric Shoulder Abduction at Wall  - 1 x daily - 7 x weekly - 3 sets - 10 reps  - Standing Isometric Shoulder External Rotation with Doorway  - 1 x daily - 7 x weekly - 3 sets - 10 reps         "

## 2025-02-24 ENCOUNTER — OFFICE VISIT (OUTPATIENT)
Dept: PHYSICAL THERAPY | Facility: REHABILITATION | Age: 55
End: 2025-02-24
Payer: COMMERCIAL

## 2025-02-24 DIAGNOSIS — M54.12 CERVICAL RADICULOPATHY: ICD-10-CM

## 2025-02-24 DIAGNOSIS — M25.511 CHRONIC RIGHT SHOULDER PAIN: Primary | ICD-10-CM

## 2025-02-24 DIAGNOSIS — G89.29 CHRONIC RIGHT SHOULDER PAIN: Primary | ICD-10-CM

## 2025-02-24 PROCEDURE — 97110 THERAPEUTIC EXERCISES: CPT

## 2025-02-24 PROCEDURE — 97530 THERAPEUTIC ACTIVITIES: CPT

## 2025-02-24 NOTE — PROGRESS NOTES
"Daily Note     Today's date: 2025  Patient name: Verito Weathers  : 1970  MRN: [id number]  Referring provider: Gabrielle Stock MD  Dx:   Encounter Diagnosis     ICD-10-CM    1. Chronic right shoulder pain  M25.511     G89.29       2. Cervical radiculopathy  M54.12                      Subjective: pt reports with c/o stiffness in shoulder pre-tx. She noted some soreness following IE, but nothing significant.       Objective: See treatment diary below      Assessment: Pt tolerated treatment well. Good response with addition of exercises and progressions. Patient demonstrated fatigue post treatment, exhibited good technique with therapeutic exercises, and would benefit from continued PT      Plan: Continue per plan of care.  Progress treatment as tolerated.       Precautions:  Patient Active Problem List   Diagnosis    Mixed hyperlipidemia    Screening for HIV (human immunodeficiency virus)    Class 1 obesity without serious comorbidity with body mass index (BMI) of 30.0 to 30.9 in adult    LFTs abnormal    Abnormal renal function    High gamma glutamyl transferase (GGT)    Increased MCV    Obesity (BMI 30-39.9)    Leukopenia       Patient's Goals:    Date            Visits 1 2 3 4 5 6 7 8 9 10   Visits approved              FOTO comp            Eval/Re-eval IE                         Education             HEP/POC MATEUS            Manuals                                                    Ther Ex             Pulleys   5'           UBE  L1x3'           4-way ISO 5\"x10ea 5\"x10ea           UT stretch 10\"x10 10\"  x10           Wall slides Flex/scap 5\"x10 Flex/scap 5\"x10           Table slides  Scap/ER 5\"x10 Scap/ER 5\"  2x10           Scap retractions  5\"x10                                                  Neuro Re-ed             Ther Activity                                       Gait Training                                       Modalities                                       Access Code: " [email or web address]  URL: [email or web address]  Date: 02/20/2025  Prepared by: Siri Thomas    Exercises  - Shoulder Flexion Wall Slide with Towel  - 1 x daily - 7 x weekly - 3 sets - 10 reps  - Scaption Wall Slide with Towel  - 1 x daily - 7 x weekly - 3 sets - 10 reps  - Standing Isometric Shoulder Internal Rotation at Doorway  - 1 x daily - 7 x weekly - 3 sets - 10 reps  - Isometric Shoulder Flexion at Wall  - 1 x daily - 7 x weekly - 3 sets - 10 reps  - Isometric Shoulder Abduction at Wall  - 1 x daily - 7 x weekly - 3 sets - 10 reps  - Standing Isometric Shoulder External Rotation with Doorway  - 1 x daily - 7 x weekly - 3 sets - 10 reps

## 2025-03-03 ENCOUNTER — OFFICE VISIT (OUTPATIENT)
Dept: PHYSICAL THERAPY | Facility: REHABILITATION | Age: 55
End: 2025-03-03
Payer: COMMERCIAL

## 2025-03-03 DIAGNOSIS — M25.511 CHRONIC RIGHT SHOULDER PAIN: Primary | ICD-10-CM

## 2025-03-03 DIAGNOSIS — M54.12 CERVICAL RADICULOPATHY: ICD-10-CM

## 2025-03-03 DIAGNOSIS — G89.29 CHRONIC RIGHT SHOULDER PAIN: Primary | ICD-10-CM

## 2025-03-03 PROCEDURE — 97140 MANUAL THERAPY 1/> REGIONS: CPT | Performed by: PHYSICAL THERAPIST

## 2025-03-03 PROCEDURE — 97110 THERAPEUTIC EXERCISES: CPT | Performed by: PHYSICAL THERAPIST

## 2025-03-03 NOTE — PROGRESS NOTES
Daily Note     Today's date: 3/3/2025  Patient name: Verito Weathers  : 1970  MRN: [id number]  Referring provider: Gabrielle Stock MD  Dx:   Encounter Diagnosis     ICD-10-CM    1. Chronic right shoulder pain  M25.511     G89.29       2. 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 She also reports pain following HEP. She did not complete them on  due to fatigue and tiredness from working and lack of sleep.      Objective: See treatment diary below      Assessment: Pt tolerated treatment fair this visit. She demonstrated good challenge with exercises as noted below however did subjectively report increase in discomfort of biceps tendon/subscapularis region with resisted IR and median wall nerve glides. Pt educated on performing nerve glides 2-3 x/day with careful monitoring for adverse symptoms. Unable to reproduce pain manual nerve glides and will perform cervical traction next visit to determine if cervical involvement this date. Pt would continue to benefit from skilled PT to address complaints and improve R UE function to complete occupational requirements with minimal limitations.       Plan: Continue per plan of care.      Precautions:  Patient Active Problem List   Diagnosis    Mixed hyperlipidemia    Screening for HIV (human immunodeficiency virus)    Class 1 obesity without serious comorbidity with body mass index (BMI) of 30.0 to 30.9 in adult    LFTs abnormal    Abnormal renal function    High gamma glutamyl transferase (GGT)    Increased MCV    Obesity (BMI 30-39.9)    Leukopenia       Patient's Goals:    Date  3/3          Visits 1 2 3 4 5 6 7 8 9 10   Visits approved              FOTO comp            Eval/Re-eval IE                         Education             HEP/POC MATEUS         "    Manuals             Cervical traction   nv          R GHJ post mobs             R median/ulnar nerve glides             Ther Ex             Pulleys   5' 5'          UBE  L1x3' np          4-way ISO 5\"x10ea 5\"x10ea IR/ER   GTB/PTB 5\" 2x10          UT stretch 10\"x10 10\"  x10 np          Wall slides Flex/scap 5\"x10 Flex/scap 5\"x10 np          Table slides  Scap/ER 5\"x10 Scap/ER 5\"  2x10 np          SL abd   2# 1x10          SL ER   3# 1x10          Scap retractions  5\"x10 Rows PuTB 5\" 2x10          Wall median nerve glide   x10                                    Neuro Re-ed             Ther Activity                                       Gait Training                                       Modalities                                       Access Code: [email or web address]  URL: [email or web address]  Date: 02/20/2025  Prepared by: Siri Thomas    Exercises  - Shoulder Flexion Wall Slide with Towel  - 1 x daily - 7 x weekly - 3 sets - 10 reps  - Scaption Wall Slide with Towel  - 1 x daily - 7 x weekly - 3 sets - 10 reps  - Standing Isometric Shoulder Internal Rotation at Doorway  - 1 x daily - 7 x weekly - 3 sets - 10 reps  - Isometric Shoulder Flexion at Wall  - 1 x daily - 7 x weekly - 3 sets - 10 reps  - Isometric Shoulder Abduction at Wall  - 1 x daily - 7 x weekly - 3 sets - 10 reps  - Standing Isometric Shoulder External Rotation with Doorway  - 1 x daily - 7 x weekly - 3 sets - 10 reps           "

## 2025-03-04 ENCOUNTER — OFFICE VISIT (OUTPATIENT)
Dept: FAMILY MEDICINE CLINIC | Facility: CLINIC | Age: 55
End: 2025-03-04
Payer: COMMERCIAL

## 2025-03-04 VITALS
WEIGHT: 208.8 LBS | TEMPERATURE: 97.1 F | DIASTOLIC BLOOD PRESSURE: 86 MMHG | OXYGEN SATURATION: 98 % | HEIGHT: 68 IN | HEART RATE: 97 BPM | SYSTOLIC BLOOD PRESSURE: 128 MMHG | BODY MASS INDEX: 31.64 KG/M2

## 2025-03-04 DIAGNOSIS — R20.2 PARESTHESIA OF LEFT LEG: ICD-10-CM

## 2025-03-04 DIAGNOSIS — M25.572 CHRONIC PAIN OF LEFT ANKLE: Primary | ICD-10-CM

## 2025-03-04 DIAGNOSIS — G89.29 CHRONIC PAIN OF LEFT ANKLE: Primary | ICD-10-CM

## 2025-03-04 PROCEDURE — 99214 OFFICE O/P EST MOD 30 MIN: CPT | Performed by: FAMILY MEDICINE

## 2025-03-04 NOTE — PROGRESS NOTES
"Name: Verito Weathers      : 1970      MRN: [id number]  Encounter Provider: Gabrielle Stock MD  Encounter Date: 3/4/2025   Encounter department: [facility]  :  Assessment & Plan  Chronic pain of left ankle    Orders:    Ambulatory Referral to Physical Therapy; Future    Paresthesia of left leg    Orders:    Ambulatory Referral to Physical Therapy; Future           History of Present Illness   HPI  54yof here with ankle pain and some parasthesias intermittently of ankle and foot  Pt denies any trauma, gait is normal  Points to lateral aspect of ankle when asked where pain is and states it goes into top of foot  Is getting PT and would like referral for this  Discussed possibly seeing ortho or doing further imaging if needed    Pt also had dropped off paperwork for FMLA  States that she sometimes wakes up and her body aches0 feels this started from COVID  Pr requesting this filled out as she is running low on days to take  Reviewed questions with pt and filled papers out  Review of Systems   Constitutional: Negative.  Negative for chills and fever.   HENT: Negative.  Negative for ear pain and sore throat.    Eyes:  Negative for pain and visual disturbance.   Respiratory: Negative.  Negative for cough and shortness of breath.    Cardiovascular: Negative.  Negative for chest pain and palpitations.   Gastrointestinal: Negative.  Negative for abdominal pain and vomiting.   Genitourinary: Negative.  Negative for dysuria and hematuria.   Musculoskeletal:  Positive for arthralgias and myalgias. Negative for back pain.   Skin:  Negative for color change and rash.   Neurological: Negative.  Negative for seizures and syncope.   Psychiatric/Behavioral: Negative.     All other systems reviewed and are negative.      Objective   /86 (BP Location: Left arm, Patient Position: Sitting, Cuff Size: Large)   Pulse 97   Temp (!) 97.1 °F (36.2 °C) (Temporal)   Ht 5' 7.5\" (1.715 m)   Wt 94.7 kg (208 lb " 12.8 oz)   LMP 10/02/2017   SpO2 98%   BMI 32.22 kg/m²      Physical Exam  Vitals and nursing note reviewed.   Constitutional:       Appearance: Normal appearance.   HENT:      Head: Normocephalic.   Eyes:      Pupils: Pupils are equal, round, and reactive to light.   Pulmonary:      Effort: Pulmonary effort is normal. No respiratory distress.      Breath sounds: No wheezing.   Musculoskeletal:         General: Tenderness present. No swelling. Normal range of motion.      Cervical back: Neck supple.      Comments: Pain laterally of dorsum of left foot  No swelling, ecchymsis   Neurological:      General: No focal deficit present.      Mental Status: She is alert and oriented to person, place, and time.   Psychiatric:         Mood and Affect: Mood normal.         Behavior: Behavior normal.

## 2025-03-05 ENCOUNTER — OFFICE VISIT (OUTPATIENT)
Dept: PHYSICAL THERAPY | Facility: REHABILITATION | Age: 55
End: 2025-03-05
Payer: COMMERCIAL

## 2025-03-05 DIAGNOSIS — M25.511 CHRONIC RIGHT SHOULDER PAIN: Primary | ICD-10-CM

## 2025-03-05 DIAGNOSIS — M54.12 CERVICAL RADICULOPATHY: ICD-10-CM

## 2025-03-05 DIAGNOSIS — G89.29 CHRONIC RIGHT SHOULDER PAIN: Primary | ICD-10-CM

## 2025-03-05 PROCEDURE — 97012 MECHANICAL TRACTION THERAPY: CPT | Performed by: PHYSICAL THERAPIST

## 2025-03-05 NOTE — PROGRESS NOTES
"Daily Note     Today's date: 3/5/2025  Patient name: Verito Weathers  : 1970  MRN: [id number]  Referring provider: Gabrielle Stock MD  Dx:   Encounter Diagnosis     ICD-10-CM    1. Chronic right shoulder pain  M25.511     G89.29       2. Cervical radiculopathy  M54.12           Start Time: 0815  Stop Time: 0900  Total time in clinic (min): 45 minutes    Subjective: Pt reports that she is still in pain and notes soreness following previous visit which she still states is present upon arrival this date. She contributes to increase discomfort due to weather.      Objective: See treatment diary below      Assessment: Pt tolerated treatment fair this visit. Trial of cervical mechanical traction was completed this date however no significant response and will discharge for next visit. Due to increase in pain with resisted IR/ER so treatment plan regressed to performing dynamic ISO with slight improvement in discomfort noted. Pt educated on continuing current HEP and modifying occupational requirements as able. Pt would continue to benefit from skilled PT to address deficits.      Plan: Continue per plan of care.      Precautions:  Patient Active Problem List   Diagnosis    Mixed hyperlipidemia    Screening for HIV (human immunodeficiency virus)    Class 1 obesity without serious comorbidity with body mass index (BMI) of 30.0 to 30.9 in adult    LFTs abnormal    Abnormal renal function    High gamma glutamyl transferase (GGT)    Increased MCV    Obesity (BMI 30-39.9)    Leukopenia       Patient's Goals:    Date 2/20 2/24 3/3 3/5         Visits 1 2 3 4 5 6 7 8 9 10   Visits approved              FOTO comp            Eval/Re-eval IE                         Education             HEP/POC MATEUS            Manuals             Cervical traction   nv          R GHJ post mobs             R median/ulnar nerve glides             Ther Ex             Pulleys   5' 5' 5'         UBE  L1x3' np L1x3'         4-way ISO 5\"x10ea 5\"x10ea " "IR/ER   GTB/PTB 5\" 2x10 Dyn iso PTB 2x10ea         UT stretch 10\"x10 10\"  x10 np          Wall slides Flex/scap 5\"x10 Flex/scap 5\"x10 np Flex/scap 5\"x10         Table slides  Scap/ER 5\"x10 Scap/ER 5\"  2x10 np          SL abd   2# 1x10 nv         SL ER   3# 1x10 nv         Scap retractions  5\"x10 Rows PuTB 5\" 2x10 Rows PuTB 5\" 2x10         Wall median nerve glide   x10 nv                                   Neuro Re-ed             Ther Activity                                       Gait Training                                       Modalities             Cervical traction    12# 8' static  (D/c)                      Access Code: [email or web address]  URL: [email or web address]  Date: 02/20/2025  Prepared by: Siri Thomas    Exercises  - Shoulder Flexion Wall Slide with Towel  - 1 x daily - 7 x weekly - 3 sets - 10 reps  - Scaption Wall Slide with Towel  - 1 x daily - 7 x weekly - 3 sets - 10 reps  - Standing Isometric Shoulder Internal Rotation at Doorway  - 1 x daily - 7 x weekly - 3 sets - 10 reps  - Isometric Shoulder Flexion at Wall  - 1 x daily - 7 x weekly - 3 sets - 10 reps  - Isometric Shoulder Abduction at Wall  - 1 x daily - 7 x weekly - 3 sets - 10 reps  - Standing Isometric Shoulder External Rotation with Doorway  - 1 x daily - 7 x weekly - 3 sets - 10 reps             "

## 2025-03-13 ENCOUNTER — OFFICE VISIT (OUTPATIENT)
Dept: PHYSICAL THERAPY | Facility: REHABILITATION | Age: 55
End: 2025-03-13
Payer: COMMERCIAL

## 2025-03-13 ENCOUNTER — APPOINTMENT (OUTPATIENT)
Dept: PHYSICAL THERAPY | Facility: REHABILITATION | Age: 55
End: 2025-03-13
Payer: COMMERCIAL

## 2025-03-13 DIAGNOSIS — G89.29 CHRONIC RIGHT SHOULDER PAIN: Primary | ICD-10-CM

## 2025-03-13 DIAGNOSIS — M25.511 CHRONIC RIGHT SHOULDER PAIN: Primary | ICD-10-CM

## 2025-03-13 DIAGNOSIS — M54.12 CERVICAL RADICULOPATHY: ICD-10-CM

## 2025-03-13 PROCEDURE — 97112 NEUROMUSCULAR REEDUCATION: CPT

## 2025-03-13 PROCEDURE — 97110 THERAPEUTIC EXERCISES: CPT

## 2025-03-13 NOTE — PROGRESS NOTES
"Daily Note     Today's date: 3/13/2025  Patient name: Verito Weathers  : 1970  MRN: [id number]  Referring provider: Gabreille Stock MD  Dx:   Encounter Diagnosis     ICD-10-CM    1. Chronic right shoulder pain  M25.511     G89.29       2. Cervical radiculopathy  M54.12           Start Time: 0815  Stop Time: 0855  Total time in clinic (min): 40 minutes    Subjective: Patient reports no new complaints. Continues to have right shoulder pain and states she is coming in Monday for her foot as well.       Objective: See treatment diary below      Assessment: Patient tolerated treatment well and was able to complete all exercises with only mild complaints of discomfort with S/L ER. Pt will benefit from continued skilled PT intervention in order to address remaining limitations and to restore maximal function.         Plan: Continue per plan of care.      Precautions:  Patient Active Problem List   Diagnosis    Mixed hyperlipidemia    Screening for HIV (human immunodeficiency virus)    Class 1 obesity without serious comorbidity with body mass index (BMI) of 30.0 to 30.9 in adult    LFTs abnormal    Abnormal renal function    High gamma glutamyl transferase (GGT)    Increased MCV    Obesity (BMI 30-39.9)    Leukopenia       Patient's Goals:    Date 2/20 2/24 3/3 3/5 3/13        Visits 1 2 3 4 5 6 7 8 9 10   Visits approved              FOTO comp            Eval/Re-eval IE                         Education             HEP/POC MATEUS            Manuals             Cervical traction   nv          R GHJ post mobs             R median/ulnar nerve glides             Ther Ex             Pulleys   5' 5' 5' 5'        UBE  L1x3' np L1x3' L1 3'/3'        4-way ISO 5\"x10ea 5\"x10ea IR/ER   GTB/PTB 5\" 2x10 Dyn iso PTB 2x10ea Dyn iso PTB 2x10ea        UT stretch 10\"x10 10\"  x10 np          Wall slides Flex/scap 5\"x10 Flex/scap 5\"x10 np Flex/scap 5\"x10 Flex/scap 5\"x10        Table slides  Scap/ER 5\"x10 Scap/ER 5\"  2x10 np          SL " "abd   2# 1x10 nv 2# 1x10        SL ER   3# 1x10 nv 3# x15         Scap retractions  5\"x10 Rows PuTB 5\" 2x10 Rows PuTB 5\" 2x10 Rows PuTB 5\" 2x10        Wall median nerve glide   x10 nv                                   Neuro Re-ed             Ther Activity                                       Gait Training                                       Modalities             Cervical traction    12# 8' static  (D/c)                      Access Code: [email or web address]  URL: [email or web address]  Date: 02/20/2025  Prepared by: Siri Thomas    Exercises  - Shoulder Flexion Wall Slide with Towel  - 1 x daily - 7 x weekly - 3 sets - 10 reps  - Scaption Wall Slide with Towel  - 1 x daily - 7 x weekly - 3 sets - 10 reps  - Standing Isometric Shoulder Internal Rotation at Doorway  - 1 x daily - 7 x weekly - 3 sets - 10 reps  - Isometric Shoulder Flexion at Wall  - 1 x daily - 7 x weekly - 3 sets - 10 reps  - Isometric Shoulder Abduction at Wall  - 1 x daily - 7 x weekly - 3 sets - 10 reps  - Standing Isometric Shoulder External Rotation with Doorway  - 1 x daily - 7 x weekly - 3 sets - 10 reps               "

## 2025-03-17 ENCOUNTER — EVALUATION (OUTPATIENT)
Dept: PHYSICAL THERAPY | Facility: REHABILITATION | Age: 55
End: 2025-03-17
Payer: COMMERCIAL

## 2025-03-17 DIAGNOSIS — M25.511 CHRONIC RIGHT SHOULDER PAIN: Primary | ICD-10-CM

## 2025-03-17 DIAGNOSIS — G89.29 CHRONIC RIGHT SHOULDER PAIN: Primary | ICD-10-CM

## 2025-03-17 DIAGNOSIS — M54.16 LUMBAR RADICULOPATHY: ICD-10-CM

## 2025-03-17 PROCEDURE — 97164 PT RE-EVAL EST PLAN CARE: CPT | Performed by: PHYSICAL THERAPIST

## 2025-03-17 PROCEDURE — 97535 SELF CARE MNGMENT TRAINING: CPT | Performed by: PHYSICAL THERAPIST

## 2025-03-17 PROCEDURE — 97112 NEUROMUSCULAR REEDUCATION: CPT | Performed by: PHYSICAL THERAPIST

## 2025-03-17 NOTE — PROGRESS NOTES
PT Re-Evaluation     Today's date: 3/17/2025  Patient name: Verito Weathers  : 1970  MRN: [id number]  Referring provider: Gabrielle Stock MD  Dx:   No diagnosis found.                 Assessment  Impairments: abnormal or restricted ROM, impaired physical strength, lacks appropriate home exercise program, pain with function and activity limitations    Assessment details: Verito Weathers presents to outpatient physical therapy for re-evaluation due to recent exacerbation of radiculopathy into L lateral LE with prolonged standing activity. Pt presents with (+) slump testing bilateral, (+) peroneal bias SLR L>R, decreased fibular mobility with increase in peroneal nerve pain, positive response to JULIA with reduction in symptom intensity following. Pt would continue to benefit from skilled PT to address shoulder deficits and optimize LE function to perform occupational requirements without limitations.           Verito Weathers presents to outpatient physical with complaints of acute on chronic R shoulder pain as well as mechanical s/s suggestive of cervical radiculopathy. Pt presents with limited cervical and R shoulder ROM, hypomobility of C4-7, strong but painful R shoulder MMT, (+) R ULNTT, decreased R scapular strength  leading to activity intolerance.     Based on patient's age and motivation, patient is a positive candidate to respond favorably to physical therapy with a good prognosis. Patient was educated on examination and techniques, plan of care, goals of therapy, and HEP. Patient was provided an opportunity to ask any questions. Provided Pt initial HEP. Patient demonstrated and verbalized understanding. Verbally reported to hold exercises if increased pain or discomfort. Pt will be seen 1-2x/week for 6-12 weeks. Patient will be re-assessed regularly in order to document progress and limit any regression. Thank you for this referral.    Understanding of Dx/Px/POC: good     Prognosis:  good    Goals  ST. Pt will improve R shoulder ROM by 25% in 4 weeks  2. Pt will improve R shoulder MMT by 1/2 grade in 4 weeks  3. Pt will report reduction in pain by 2 points in 4 weeks  4. Pt will be able to reach overhead with pain at worst of 2/10 in 4 weeks  5. Pt will be independent with initial HEP in 4 weeks    LT. Pt will restore R shoulder ROM to WFL by discharge  2. Pt will improve R shoulder MMT to 5/5 by discharge  3. Pt will improve FOTO score to indicated measure by discharge  4. Pt will be able to perform occupational tasks without pain by discharge  5. Pt will improve lumbar ROM to WFL by discharge  6. Pt will improve L LE MMT to 5/5 by discharge    Plan  Patient would benefit from: PT eval and skilled physical therapy    Planned therapy interventions: joint mobilization, manual therapy, neuromuscular re-education, patient/caregiver education, self care, strengthening, stretching, therapeutic activities, therapeutic exercise, home exercise program, graded exercise, graded activity, flexibility and functional ROM exercises    Frequency: 2x week  Duration in weeks: 8  Treatment plan discussed with: patient        Subjective Evaluation    History of Present Illness  Mechanism of injury: Verito Weathers is a 54 y.o. female that she has been experiencing L lateral leg pain that she describes as a pulling and burning that increases with prolonged standing and when she pivots on L LE while working. Symptoms initially lasted 30 seconds but has increased to a few hours after. She has been using massage and stretching which made symptoms worse and recently has used TENS with mild relief. Pt denies swelling or ecchymosis. Pt reports pain currently 2/10 and at worst 10/10. T has not received x-ray, EMG or MRI at this time. She also denies low back pain or N/t into upper thigh.       Verito Weathers is 54 y.o. female who is RHD that presents to outpatient physical therapy stating that she had COVID-19 in  2024 and she noted every joint hurt in her body. When she began to recover, she noted her R shoulder continued to be painful. She has been using TENS machine with mild relief. She reports that her symptoms radiate into R forearm and terminates into R first digit. 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 She does note difficulty following physical activity. She had a follow-up with PCP and she performed MMT on R shoulder and patient notes ever since that incident, there has been an increase in pain of R shoulder. Pt denies neck pain, N/T into UEs, diplopia, dysarthria, dysphagia, drop attacks, nystagmus, nausea or vomiting, fever, chills. Does note SOB but she is recovering from flu.   Quality of life: good    Patient Goals  Patient goals for therapy: increased strength, decreased pain, increased motion, independence with ADLs/IADLs and return to sport/leisure activities    Pain  Current pain ratin  At worst pain ratin  Location: Right shoulder  Quality: radiating, sharp, tight and dull ache    Hand dominance: right      Diagnostic Tests  X-ray: normal    FCE comments: Right shoulder x-ray:  There is no acute fracture or dislocation.  No significant degenerative changes.  No lytic or blastic osseous lesion.  Soft tissues are unremarkable.  Treatments  No previous or current treatments        Objective     Concurrent Complaints  Negative for night pain, disturbed sleep, dizziness, faints, headaches, nausea/motion sickness, tinnitus, trouble swallowing, difficulty breathing, shortness of breath, visual change, history of cancer and history of trauma    Active Range of Motion   Cervical/Thoracic Spine       Cervical    Flexion:  WFL  Extension:  WFL  Left lateral flexion:  WFL  Right lateral flexion:  WFL  Left rotation:  WFL  Right rotation:  WFL  Left Shoulder   Normal active range of motion    Right Shoulder   Flexion: 170 degrees   Abduction: 145  degrees with pain  External rotation BTH: WFL and with pain  Internal rotation BTB: WFL and with pain    Passive Range of Motion     Right Shoulder   Flexion: 170 degrees   Abduction: 160 degrees   External rotation 0°: WFL  External rotation 90°: WFL  Internal rotation 90°: 35 degrees     Joint Play     Right Shoulder  Hypomobile in the posterior capsule and inferior capsule.   Mechanical Assessment    Cervical      Thoracic      Lumbar    Standing flexion: repeated movements   Pain location:no change  Pain intensity: worse  Pain level: increased  Standing extension: repeated movements  Pain location: peripheralized  Pain intensity: better  Pain level: decreased    Strength/Myotome Testing   Cervical Spine     Left   Normal strength    Right Shoulder     Planes of Motion   Flexion: 4+   Extension: 4   Abduction: 4+   Adduction: 5   External rotation at 0°: 5   Internal rotation at 0°: 5     Right Elbow   Flexion: 5  Extension: 5    Left Hip   Planes of Motion   Flexion: 5  Extension: 4+  Abduction: 4+  Adduction: 5  External rotation: 5  Internal rotation: 5    Right Hip   Planes of Motion   Flexion: 5  Extension: 5  Abduction: 5  Adduction: 5  External rotation: 5  Internal rotation: 5    Left Knee   Flexion: 4  Extension: 5    Right Knee   Flexion: 5  Extension: 5    Left Ankle/Foot   Dorsiflexion: 5  Plantar flexion: 4    Right Ankle/Foot   Dorsiflexion: 5  Plantar flexion: 5    Tests     Right Shoulder   Positive ULTT1.     Lumbar     Left   Positive passive SLR and slump test.   Negative crossed SLR and quadrant.     Right   Positive passive SLR and slump test.   Negative crossed SLR and quadrant.     Left Hip   Negative ASHLEY and FADIR.     Right Hip   Negative ASHLEY and FADIR.     Left Knee   Positive peroneal nerve tension.     Right Knee   Positive peroneal nerve tension.   Neuro Exam:     Headaches   Patient reports headaches: No.                  Precautions:  Patient Active Problem List   Diagnosis     "Mixed hyperlipidemia    Screening for HIV (human immunodeficiency virus)    Class 1 obesity without serious comorbidity with body mass index (BMI) of 30.0 to 30.9 in adult    LFTs abnormal    Abnormal renal function    High gamma glutamyl transferase (GGT)    Increased MCV    Obesity (BMI 30-39.9)    Leukopenia       Patient's Goals:    Date 2/20 2/24 3/3 3/5 3/13 3/17       Visits 1 2 3 4 5 6 7 8 9 10   Visits approved              FOTO comp            Eval/Re-eval IE     RE                    Education             HEP/POC MATEUS            Manuals             Cervical traction   nv          R GHJ post mobs             R median/ulnar nerve glides             Ther Ex             Pulleys   5' 5' 5' 5'        UBE  L1x3' np L1x3' L1 3'/3'        4-way ISO 5\"x10ea 5\"x10ea IR/ER   GTB/PTB 5\" 2x10 Dyn iso PTB 2x10ea Dyn iso PTB 2x10ea        UT stretch 10\"x10 10\"  x10 np          Wall slides Flex/scap 5\"x10 Flex/scap 5\"x10 np Flex/scap 5\"x10 Flex/scap 5\"x10        Table slides  Scap/ER 5\"x10 Scap/ER 5\"  2x10 np          SL abd   2# 1x10 nv 2# 1x10        SL ER   3# 1x10 nv 3# x15         Scap retractions  5\"x10 Rows PuTB 5\" 2x10 Rows PuTB 5\" 2x10 Rows PuTB 5\" 2x10        Wall median nerve glide   x10 nv                                   Neuro Re-ed             JULIA      3x10       Supine peroneal nerve glide      2x10       Seated sciatic glide      2x10       Ther Activity                                       Gait Training                                       Modalities             Cervical traction    12# 8' static  (D/c)                             "

## 2025-03-20 ENCOUNTER — OFFICE VISIT (OUTPATIENT)
Dept: PHYSICAL THERAPY | Facility: REHABILITATION | Age: 55
End: 2025-03-20
Payer: COMMERCIAL

## 2025-03-20 DIAGNOSIS — M54.16 LUMBAR RADICULOPATHY: ICD-10-CM

## 2025-03-20 DIAGNOSIS — M54.12 CERVICAL RADICULOPATHY: ICD-10-CM

## 2025-03-20 DIAGNOSIS — M25.511 CHRONIC RIGHT SHOULDER PAIN: Primary | ICD-10-CM

## 2025-03-20 DIAGNOSIS — G89.29 CHRONIC RIGHT SHOULDER PAIN: Primary | ICD-10-CM

## 2025-03-20 PROCEDURE — 97112 NEUROMUSCULAR REEDUCATION: CPT | Performed by: PHYSICAL THERAPIST

## 2025-03-20 PROCEDURE — 97110 THERAPEUTIC EXERCISES: CPT | Performed by: PHYSICAL THERAPIST

## 2025-03-20 NOTE — PROGRESS NOTES
"Daily Note     Today's date: 3/20/2025  Patient name: Verito Weathers  : 1970  MRN: [id number]  Referring provider: Gabrielle Stock MD  Dx:   Encounter Diagnosis     ICD-10-CM    1. Chronic right shoulder pain  M25.511     G89.29       2. Lumbar radiculopathy  M54.16       3. Cervical radiculopathy  M54.12                      Subjective: Pt reports that everything feels the same. She notes that she tried to sit in different ways to decrease pain but couldn't find anything comfortable.       Objective: See treatment diary below      Assessment: Tolerated treatment well. Patient demonstrated fatigue post treatment, exhibited good technique with therapeutic exercises, and would benefit from continued PT. She was most challenged with SL strengthening and required verbal cues in order to facilitate appropriate technique and scapular positioning.       Plan: Continue per plan of care.  Progress treatment as tolerated.       Precautions:  Patient Active Problem List   Diagnosis    Mixed hyperlipidemia    Screening for HIV (human immunodeficiency virus)    Class 1 obesity without serious comorbidity with body mass index (BMI) of 30.0 to 30.9 in adult    LFTs abnormal    Abnormal renal function    High gamma glutamyl transferase (GGT)    Increased MCV    Obesity (BMI 30-39.9)    Leukopenia       Patient's Goals:    Date 2/20 2/24 3/3 3/5 3/13 3/17 3/20      Visits 1 2 3 4 5 6 7 8 9 10   Visits approved              FOTO comp            Eval/Re-eval IE     RE                    Education             HEP/POC MATEUS            Manuals             Cervical traction   nv          R GHJ post mobs             R median/ulnar nerve glides             Ther Ex             Pulleys   5' 5' 5' 5'  5'      UBE  L1x3' np L1x3' L1 3'/3'  L1 3'/3'      4-way ISO 5\"x10ea 5\"x10ea IR/ER   GTB/PTB 5\" 2x10 Dyn iso PTB 2x10ea Dyn iso PTB 2x10ea  Dyn iso PTB 2x10ea      UT stretch 10\"x10 10\"  x10 np          Wall slides Flex/scap 5\"x10 " "Flex/scap 5\"x10 np Flex/scap 5\"x10 Flex/scap 5\"x10  Flex/scap 5\"x10      Table slides  Scap/ER 5\"x10 Scap/ER 5\"  2x10 np          SL abd   2# 1x10 nv 2# 1x10  2# 2x10      SL ER   3# 1x10 nv 3# x15   #3 2x10 c towel      Scap retractions  5\"x10 Rows PuTB 5\" 2x10 Rows PuTB 5\" 2x10 Rows PuTB 5\" 2x10  Rows PuTB 5\" 2x10      Wall median nerve glide   x10 nv                                   Neuro Re-ed             JULIA      3x10 3x10      Supine peroneal nerve glide      2x10 2x10      Seated sciatic glide      2x10 2x10      Ther Activity                                       Gait Training                                       Modalities             Cervical traction    12# 8' static  (D/c)                             "

## 2025-03-22 NOTE — TELEPHONE ENCOUNTER
Anesthesia Post-op Note    Patient: Berry Mora  Procedure(s) Performed: BILATERAL FLEXIBLE URETEROSCOPY WITH HOLMIUM LASER, CYSTOSCOPY WITH BILATERAL STENT EXCHANGE (Bilateral: Ureter)  Anesthesia type: MAC    Vitals Value Taken Time   Temp 36.2 °C (97.2 °F) 03/22/25 0851   Pulse 66 03/22/25 0851   Resp 16 03/22/25 0851   SpO2 97 % 03/22/25 0851   /81 03/22/25 0851         Patient Location: Phase II  Post-op Vital Signs:stable  Level of Consciousness: participates in exam, awake and alert  Respiratory Status: spontaneous ventilation and face mask  Cardiovascular blood pressure returned to baseline and stable  Hydration: euvolemic  Pain Management: well controlled  Handoff: Handoff to receiving clinician was performed and questions were answered  Vomiting: none  Nausea: None  Airway Patency:patent  Post-op Assessment: awake, alert, appropriately conversant, or baseline, no complications, patient tolerated procedure well, no evidence of recall, dentition, mouth, tongue, and oropharynx unchanged , moving all extremities and No Corneal Abrasion      There were no known notable events for this encounter.                       Erika for pt to call back and r/s 11/6/24 [facility] appt

## 2025-03-24 ENCOUNTER — OFFICE VISIT (OUTPATIENT)
Dept: PHYSICAL THERAPY | Facility: REHABILITATION | Age: 55
End: 2025-03-24
Payer: COMMERCIAL

## 2025-03-24 DIAGNOSIS — M25.511 CHRONIC RIGHT SHOULDER PAIN: Primary | ICD-10-CM

## 2025-03-24 DIAGNOSIS — G89.29 CHRONIC RIGHT SHOULDER PAIN: Primary | ICD-10-CM

## 2025-03-24 DIAGNOSIS — M54.16 LUMBAR RADICULOPATHY: ICD-10-CM

## 2025-03-24 DIAGNOSIS — M54.12 CERVICAL RADICULOPATHY: ICD-10-CM

## 2025-03-24 PROCEDURE — 97110 THERAPEUTIC EXERCISES: CPT | Performed by: PHYSICAL THERAPIST

## 2025-03-24 PROCEDURE — 97112 NEUROMUSCULAR REEDUCATION: CPT | Performed by: PHYSICAL THERAPIST

## 2025-03-24 NOTE — PROGRESS NOTES
"Daily Note     Today's date: 3/24/2025  Patient name: Verito Weathers  : 1970  MRN: [id number]  Referring provider: Gabrielle Stock MD  Dx:   Encounter Diagnosis     ICD-10-CM    1. Chronic right shoulder pain  M25.511     G89.29       2. Lumbar radiculopathy  M54.16       3. Cervical radiculopathy  M54.12           Start Time: 0900  Stop Time: 09  Total time in clinic (min): 38 minutes    Subjective: Pt reports that she went to [facility] yesterday with her daughter for a visit and walked 12,000 steps. She denied pain or burning in L LE. She also reports improvement in R shoulder pain as she was able to lay on R shoulder without pain.      Objective: See treatment diary below      Assessment: Pt tolerated treatment well this visit. Pt able to increase repetitions of shoulder strengthening exercises without increase in pain following. Pt did not slight \"pulling\" sensation during sidelying ER but improved with rest. Pt educated on proper technique to push vital cart at work to reduce shoulder exacerbation and minimize irritation while working. Favorable response to repeated extension with reduction in neural tension noted with nerve glides. Pt would continue to benefit from skilled PT to address deficits and optimize overall function.      Plan: Continue per plan of care.      Precautions:  Patient Active Problem List   Diagnosis    Mixed hyperlipidemia    Screening for HIV (human immunodeficiency virus)    Class 1 obesity without serious comorbidity with body mass index (BMI) of 30.0 to 30.9 in adult    LFTs abnormal    Abnormal renal function    High gamma glutamyl transferase (GGT)    Increased MCV    Obesity (BMI 30-39.9)    Leukopenia       Patient's Goals:    Date 2/20 2/24 3/3 3/5 3/13 3/17 3/20 3/24     Visits 1 2 3 4 5 6 7 8 9 10   Visits approved              FOTO comp            Eval/Re-eval IE     RE                    Education             HEP/POC MATEUS            Manuals           " "  Cervical traction   nv          R GHJ post mobs             R median/ulnar nerve glides             Ther Ex             Pulleys   5' 5' 5' 5'  5' 5'     UBE  L1x3' np L1x3' L1 3'/3'  L1 3'/3' L2 3'/3'     4-way ISO 5\"x10ea 5\"x10ea IR/ER   GTB/PTB 5\" 2x10 Dyn iso PTB 2x10ea Dyn iso PTB 2x10ea  Dyn iso PTB 2x10ea Dyn iso PTB 2x10ea     UT stretch 10\"x10 10\"  x10 np          Wall slides Flex/scap 5\"x10 Flex/scap 5\"x10 np Flex/scap 5\"x10 Flex/scap 5\"x10  Flex/scap 5\"x10 Flex/scap  5\" 2x10     Table slides  Scap/ER 5\"x10 Scap/ER 5\"  2x10 np          SL abd   2# 1x10 nv 2# 1x10  2# 2x10 2# 3x10     SL ER   3# 1x10 nv 3# x15   #3 2x10 c towel 2# 3x10     Scap retractions  5\"x10 Rows PuTB 5\" 2x10 Rows PuTB 5\" 2x10 Rows PuTB 5\" 2x10  Rows PuTB 5\" 2x10 Rows PuTB 5\" 2x10     Wall median nerve glide   x10 nv                                   Neuro Re-ed             JULIA      3x10 3x10 3x10     Supine peroneal nerve glide      2x10 2x10 2x10B     Seated sciatic glide      2x10 2x10 2x10B     Ther Activity                                       Gait Training                                       Modalities             Cervical traction    12# 8' static  (D/c)                               "

## 2025-03-26 ENCOUNTER — OFFICE VISIT (OUTPATIENT)
Dept: PHYSICAL THERAPY | Facility: REHABILITATION | Age: 55
End: 2025-03-26
Payer: COMMERCIAL

## 2025-03-26 DIAGNOSIS — G89.29 CHRONIC RIGHT SHOULDER PAIN: Primary | ICD-10-CM

## 2025-03-26 DIAGNOSIS — M54.16 LUMBAR RADICULOPATHY: ICD-10-CM

## 2025-03-26 DIAGNOSIS — M25.511 CHRONIC RIGHT SHOULDER PAIN: Primary | ICD-10-CM

## 2025-03-26 PROCEDURE — 97112 NEUROMUSCULAR REEDUCATION: CPT | Performed by: PHYSICAL THERAPIST

## 2025-03-26 PROCEDURE — 97110 THERAPEUTIC EXERCISES: CPT | Performed by: PHYSICAL THERAPIST

## 2025-03-26 NOTE — PROGRESS NOTES
"Daily Note     Today's date: 3/26/2025  Patient name: Verito Weathers  : 1970  MRN: [id number]  Referring provider: Gabrielle Stock MD  Dx:   Encounter Diagnosis     ICD-10-CM    1. Chronic right shoulder pain  M25.511     G89.29       2. Lumbar radiculopathy  M54.16           Start Time: 0900  Stop Time: 09  Total time in clinic (min): 38 minutes    Subjective: Pt reports that she was doing well but on the way to PT experienced sharp pain in her shoulder and L LE is burning. She has not completed her HEP this morning but reports that she barely completed them yesterday as well.      Objective: See treatment diary below      Assessment: Pt tolerated treatment well this visit. Pt demonstrates favorable response to distal LE nerve glides as well as repeated lumbar extension in standing. Pt noted improvement in radicular symptoms post-session compared to initiation of PT this visit. Pt did experience slight increase in R shoulder pain but able to tolerate increased resistance and repetitions this visit.      Plan: Continue per plan of care.      Precautions:  Patient Active Problem List   Diagnosis    Mixed hyperlipidemia    Screening for HIV (human immunodeficiency virus)    Class 1 obesity without serious comorbidity with body mass index (BMI) of 30.0 to 30.9 in adult    LFTs abnormal    Abnormal renal function    High gamma glutamyl transferase (GGT)    Increased MCV    Obesity (BMI 30-39.9)    Leukopenia       Patient's Goals:    Date  3/3 3/5 3/13 3/17 3/20 3/24 3/27    Visits 1 2 3 4 5 6 7 8 9 10   Visits approved              FOTO comp            Eval/Re-eval IE     RE                    Education             HEP/POC MATUES            Manuals             Cervical traction   nv          R GHJ post mobs             R median/ulnar nerve glides             Ther Ex             Pulleys   5' 5' 5' 5'  5' 5' 5'    UBE  L1x3' np L1x3' L1 3'/3'  L1 3'/3' L2 3'/3' L2 3'/3'    4-way ISO 5\"x10ea 5\"x10ea IR/ER " "  GTB/PTB 5\" 2x10 Dyn iso PTB 2x10ea Dyn iso PTB 2x10ea  Dyn iso PTB 2x10ea Dyn iso PTB 2x10ea Dyn iso ER  GTB 3x10ea    Active IR  BTB 5\" 3x10        UT stretch 10\"x10 10\"  x10 np          Wall slides Flex/scap 5\"x10 Flex/scap 5\"x10 np Flex/scap 5\"x10 Flex/scap 5\"x10  Flex/scap 5\"x10 Flex/scap  5\" 2x10 Flex/scap  5\" 2x10    Table slides  Scap/ER 5\"x10 Scap/ER 5\"  2x10 np          SL abd   2# 1x10 nv 2# 1x10  2# 2x10 2# 3x10 2# 3x10    SL ER   3# 1x10 nv 3# x15   #3 2x10 c towel 2# 3x10 2# 3x10    Scap retractions  5\"x10 Rows PuTB 5\" 2x10 Rows PuTB 5\" 2x10 Rows PuTB 5\" 2x10  Rows PuTB 5\" 2x10 Rows PuTB 5\" 2x10 Rows/ext PuTB 5\" 3x10    Wall median nerve glide   x10 nv                                   Neuro Re-ed             JULIA      3x10 3x10 3x10 3x10    Supine peroneal nerve glide      2x10 2x10 2x10B 2x10B    Seated sciatic glide      2x10 2x10 2x10B 2x10B    Ther Activity                                       Gait Training                                       Modalities             Cervical traction    12# 8' static  (D/c)                                 "

## 2025-03-31 ENCOUNTER — OFFICE VISIT (OUTPATIENT)
Dept: PHYSICAL THERAPY | Facility: REHABILITATION | Age: 55
End: 2025-03-31
Payer: COMMERCIAL

## 2025-03-31 DIAGNOSIS — M54.16 LUMBAR RADICULOPATHY: ICD-10-CM

## 2025-03-31 DIAGNOSIS — M25.511 CHRONIC RIGHT SHOULDER PAIN: Primary | ICD-10-CM

## 2025-03-31 DIAGNOSIS — M54.12 CERVICAL RADICULOPATHY: ICD-10-CM

## 2025-03-31 DIAGNOSIS — G89.29 CHRONIC RIGHT SHOULDER PAIN: Primary | ICD-10-CM

## 2025-03-31 PROCEDURE — 97110 THERAPEUTIC EXERCISES: CPT | Performed by: PHYSICAL THERAPIST

## 2025-03-31 PROCEDURE — 97112 NEUROMUSCULAR REEDUCATION: CPT | Performed by: PHYSICAL THERAPIST

## 2025-03-31 NOTE — PROGRESS NOTES
"Daily Note     Today's date: 3/31/2025  Patient name: Verito Weathers  : 1970  MRN: [id number]  Referring provider: Gabrielle Stock MD  Dx:   Encounter Diagnosis     ICD-10-CM    1. Chronic right shoulder pain  M25.511     G89.29       2. Lumbar radiculopathy  M54.16       3. Cervical radiculopathy  M54.12           Start Time: 1300  Stop Time: 1340  Total time in clinic (min): 40 minutes    Subjective: Pt reports that she has noticed improvement in R shoulder pain. She continues to have good and bad days with increased pain with increased work requirements.       Objective: See treatment diary below      Assessment: Pt tolerated treatment well this visit. Pt able to tolerate progressions of scapular strengthening noted this visit. Pt denied radicular symptoms with progressions this session and denied UE pain post-session.   Pt would continue to benefit from skilled PT to address deficits and progress strength to minimize work-related fatigue and pain.      Plan: Continue per plan of care.      Precautions:  Patient Active Problem List   Diagnosis    Mixed hyperlipidemia    Screening for HIV (human immunodeficiency virus)    Class 1 obesity without serious comorbidity with body mass index (BMI) of 30.0 to 30.9 in adult    LFTs abnormal    Abnormal renal function    High gamma glutamyl transferase (GGT)    Increased MCV    Obesity (BMI 30-39.9)    Leukopenia       Patient's Goals:    Date 2/20 2/24 3/3 3/5 3/13 3/17 3/20 3/24 3/27 3/31   Visits 1 2 3 4 5 6 7 8 9 10   Visits approved              FOTO comp            Eval/Re-eval IE     RE                    Education             HEP/POC MATEUS            Manuals             Cervical traction   nv          R GHJ post mobs             R median/ulnar nerve glides             Ther Ex             Pulleys   5' 5' 5' 5'  5' 5' 5'    UBE  L1x3' np L1x3' L1 3'/3'  L1 3'/3' L2 3'/3' L2 3'/3' L2 3'/3'   4-way ISO 5\"x10ea 5\"x10ea IR/ER   GTB/PTB 5\" 2x10 Dyn iso PTB 2x10ea " "Dyn iso PTB 2x10ea  Dyn iso PTB 2x10ea Dyn iso PTB 2x10ea Dyn iso ER  GTB 3x10ea    Active IR  BTB 5\" 3x10     Dyn iso ER  GTB 3x10ea    Active IR  BTB 5\" 3x10   UT stretch 10\"x10 10\"  x10 np          Wall slides Flex/scap 5\"x10 Flex/scap 5\"x10 np Flex/scap 5\"x10 Flex/scap 5\"x10  Flex/scap 5\"x10 Flex/scap  5\" 2x10 Flex/scap  5\" 2x10 np   Table slides  Scap/ER 5\"x10 Scap/ER 5\"  2x10 np          SL abd   2# 1x10 nv 2# 1x10  2# 2x10 2# 3x10 2# 3x10 2# 3x10   SL ER   3# 1x10 nv 3# x15   #3 2x10 c towel 2# 3x10 2# 3x10 2# 3x10   Scap retractions  5\"x10 Rows PuTB 5\" 2x10 Rows PuTB 5\" 2x10 Rows PuTB 5\" 2x10  Rows PuTB 5\" 2x10 Rows PuTB 5\" 2x10 Rows/ext PuTB 5\" 3x10 Rows/ext PuTB 5\" 3x10   Prone GHJ ext          5\" 3x10   Wall median nerve glide   x10 nv                                   Neuro Re-ed             JULIA      3x10 3x10 3x10 3x10 3x10   Supine peroneal nerve glide      2x10 2x10 2x10B 2x10B 2x10B   Seated sciatic glide      2x10 2x10 2x10B 2x10B 2x10B   Ther Activity                                       Gait Training                                       Modalities             Cervical traction    12# 8' static  (D/c)                                   "

## 2025-04-02 ENCOUNTER — OFFICE VISIT (OUTPATIENT)
Dept: PHYSICAL THERAPY | Facility: REHABILITATION | Age: 55
End: 2025-04-02
Payer: COMMERCIAL

## 2025-04-02 DIAGNOSIS — G89.29 CHRONIC RIGHT SHOULDER PAIN: ICD-10-CM

## 2025-04-02 DIAGNOSIS — M25.511 CHRONIC RIGHT SHOULDER PAIN: ICD-10-CM

## 2025-04-02 DIAGNOSIS — M54.16 LUMBAR RADICULOPATHY: Primary | ICD-10-CM

## 2025-04-02 PROCEDURE — 97110 THERAPEUTIC EXERCISES: CPT | Performed by: PHYSICAL THERAPIST

## 2025-04-02 PROCEDURE — 97112 NEUROMUSCULAR REEDUCATION: CPT | Performed by: PHYSICAL THERAPIST

## 2025-04-02 NOTE — PROGRESS NOTES
"Daily Note     Today's date: 2025  Patient name: Verito Weathers  : 1970  MRN: [id number]  Referring provider: Gabrielle Stock MD  Dx:   Encounter Diagnosis     ICD-10-CM    1. Lumbar radiculopathy  M54.16       2. Chronic right shoulder pain  M25.511     G89.29           Start Time: 0730  Stop Time: 0810  Total time in clinic (min): 40 minutes    Subjective: Pt reports that she continues to have pain of R shoulder that increases with work-related activities.      Objective: See treatment diary below      Assessment: Pt tolerated treatment well this visit. She demonstrated good challenge with increased repetitions provided this visit and good carryover of exercises from previous session. Pt denied increase in pain post-session. She was educated on importance of completing HEP to maintain current progress especially due to decreasing frequency of PT sessions. Pt would continue to benefit from skilled PT to address deficits and optimize UE function.     Plan: Continue per plan of care.      Precautions:  Patient Active Problem List   Diagnosis    Mixed hyperlipidemia    Screening for HIV (human immunodeficiency virus)    Class 1 obesity without serious comorbidity with body mass index (BMI) of 30.0 to 30.9 in adult    LFTs abnormal    Abnormal renal function    High gamma glutamyl transferase (GGT)    Increased MCV    Obesity (BMI 30-39.9)    Leukopenia       Patient's Goals:    Date 4/2  3/3 3/5 3/13 3/17 3/20 3/24 3/27 3/31   Visits   3 4 5 6 7 8 9 10   Visits approved              FOTO             Eval/Re-eval      RE                    Education             HEP/POC             Manuals             Cervical traction   nv          R GHJ post mobs             R median/ulnar nerve glides             Ther Ex             Pulleys    5' 5' 5'  5' 5' 5'    UBE L2 3'/3'  np L1x3' L1 3'/3'  L1 3'/3' L2 3'/3' L2 3'/3' L2 3'/3'   4-way ISO Dyn iso ER  GTB 3x10ea    Active IR  BTB 5\" 3x10  IR/ER   GTB/PTB 5\" 2x10 Dyn " "iso PTB 2x10ea Dyn iso PTB 2x10ea  Dyn iso PTB 2x10ea Dyn iso PTB 2x10ea Dyn iso ER  GTB 3x10ea    Active IR  BTB 5\" 3x10     Dyn iso ER  GTB 3x10ea    Active IR  BTB 5\" 3x10   UT stretch   np          Wall slides   np Flex/scap 5\"x10 Flex/scap 5\"x10  Flex/scap 5\"x10 Flex/scap  5\" 2x10 Flex/scap  5\" 2x10 np   Table slides    np          SL abd 2# 3x10  2# 1x10 nv 2# 1x10  2# 2x10 2# 3x10 2# 3x10 2# 3x10   SL ER 2# 3x10  3# 1x10 nv 3# x15   #3 2x10 c towel 2# 3x10 2# 3x10 2# 3x10   Scap retractions Rows/ext PuTB 5\" 3x10  Rows PuTB 5\" 2x10 Rows PuTB 5\" 2x10 Rows PuTB 5\" 2x10  Rows PuTB 5\" 2x10 Rows PuTB 5\" 2x10 Rows/ext PuTB 5\" 3x10 Rows/ext PuTB 5\" 3x10   Prone GHJ ext          5\" 3x10   Wall median nerve glide   x10 nv                                   Neuro Re-ed             JULIA 3x10     3x10 3x10 3x10 3x10 3x10   Supine peroneal nerve glide 2x10B     2x10 2x10 2x10B 2x10B 2x10B   Seated sciatic glide 2x10B     2x10 2x10 2x10B 2x10B 2x10B   Ther Activity                                       Gait Training                                       Modalities             Cervical traction    12# 8' static  (D/c)                                     "

## 2025-04-05 ENCOUNTER — APPOINTMENT (OUTPATIENT)
Dept: LAB | Age: 55
End: 2025-04-05

## 2025-04-05 DIAGNOSIS — Z00.8 HEALTH EXAMINATION IN POPULATION SURVEY: ICD-10-CM

## 2025-04-05 LAB
CHOLEST SERPL-MCNC: 244 MG/DL (ref ?–200)
EST. AVERAGE GLUCOSE BLD GHB EST-MCNC: 123 MG/DL
HBA1C MFR BLD: 5.9 %
HDLC SERPL-MCNC: 77 MG/DL
LDLC SERPL CALC-MCNC: 144 MG/DL (ref 0–100)
NONHDLC SERPL-MCNC: 167 MG/DL
TRIGL SERPL-MCNC: 113 MG/DL (ref ?–150)

## 2025-04-05 PROCEDURE — 36415 COLL VENOUS BLD VENIPUNCTURE: CPT

## 2025-04-05 PROCEDURE — 83036 HEMOGLOBIN GLYCOSYLATED A1C: CPT

## 2025-04-05 PROCEDURE — 80061 LIPID PANEL: CPT

## 2025-04-07 ENCOUNTER — RESULTS FOLLOW-UP (OUTPATIENT)
Dept: FAMILY MEDICINE CLINIC | Facility: CLINIC | Age: 55
End: 2025-04-07

## 2025-04-09 ENCOUNTER — OFFICE VISIT (OUTPATIENT)
Dept: PHYSICAL THERAPY | Facility: REHABILITATION | Age: 55
End: 2025-04-09
Payer: COMMERCIAL

## 2025-04-09 DIAGNOSIS — M54.12 CERVICAL RADICULOPATHY: ICD-10-CM

## 2025-04-09 DIAGNOSIS — M25.511 CHRONIC RIGHT SHOULDER PAIN: ICD-10-CM

## 2025-04-09 DIAGNOSIS — M54.16 LUMBAR RADICULOPATHY: Primary | ICD-10-CM

## 2025-04-09 DIAGNOSIS — G89.29 CHRONIC RIGHT SHOULDER PAIN: ICD-10-CM

## 2025-04-09 PROCEDURE — 97112 NEUROMUSCULAR REEDUCATION: CPT | Performed by: PHYSICAL THERAPIST

## 2025-04-09 PROCEDURE — 97110 THERAPEUTIC EXERCISES: CPT | Performed by: PHYSICAL THERAPIST

## 2025-04-09 NOTE — PROGRESS NOTES
Daily Note     Today's date: 2025  Patient name: Verito Weathers  : 1970  MRN: [id number]  Referring provider: Gabrielle Stock MD  Dx:   Encounter Diagnosis     ICD-10-CM    1. Lumbar radiculopathy  M54.16       2. Chronic right shoulder pain  M25.511     G89.29       3. Cervical radiculopathy  M54.12           Start Time: 1430  Stop Time: 1510  Total time in clinic (min): 40 minutes    Subjective: Pt reports that she will experiencing burning in her L lateral leg with prolonged sitting. Her shoulder is doing well overall.       Objective: See treatment diary below      Assessment: Pt tolerated treatment well this visit. Pt demonstrates good challenge with exercises as documented below. She denied pain throughout session or post-session. She has met all PT functional and personal goals. Education provided on HEP and technique for occupational requirements to minimize risk of injury. Pt encouraged to contact primary PT with any questions or concerns following this discharge date. Pt in agreement with current POC.      Plan: Continue per plan of care.      Precautions:  Patient Active Problem List   Diagnosis    Mixed hyperlipidemia    Screening for HIV (human immunodeficiency virus)    Class 1 obesity without serious comorbidity with body mass index (BMI) of 30.0 to 30.9 in adult    LFTs abnormal    Abnormal renal function    High gamma glutamyl transferase (GGT)    Increased MCV    Obesity (BMI 30-39.9)    Leukopenia       Patient's Goals:    Date 4/2 4/9    3/17 3/20 3/24 3/27 3/31   Visits      6 7 8 9 10   Visits approved              FOTO             Eval/Re-eval      RE                    Education             HEP/POC             Manuals             Cervical traction             R GHJ post mobs             R median/ulnar nerve glides             Ther Ex             Pulleys        5' 5' 5'    UBE L2 3'/3' L2 3'/3'     L1 3'/3' L2 3'/3' L2 3'/3' L2 3'/3'   4-way ISO Dyn iso ER  GTB 3x10ea    Active  "IR  BTB 5\" 3x10 Dyn iso ER  GTB 3x10ea    Active IR  BTB 5\" 3x10     Dyn iso PTB 2x10ea Dyn iso PTB 2x10ea Dyn iso ER  GTB 3x10ea    Active IR  BTB 5\" 3x10     Dyn iso ER  GTB 3x10ea    Active IR  BTB 5\" 3x10   UT stretch             Wall slides       Flex/scap 5\"x10 Flex/scap  5\" 2x10 Flex/scap  5\" 2x10 np   Table slides              SL abd 2# 3x10 2# 3x10     2# 2x10 2# 3x10 2# 3x10 2# 3x10   SL ER 2# 3x10 2# 3x10     #3 2x10 c towel 2# 3x10 2# 3x10 2# 3x10   Scap retractions Rows/ext PuTB 5\" 3x10 Rows/ext PuTB 5\" 3x10     Rows PuTB 5\" 2x10 Rows PuTB 5\" 2x10 Rows/ext PuTB 5\" 3x10 Rows/ext PuTB 5\" 3x10   Prone GHJ ext          5\" 3x10   Wall median nerve glide                                       Neuro Re-ed             JULIA 3x10 3x10    3x10 3x10 3x10 3x10 3x10   Supine peroneal nerve glide 2x10B 2x10B    2x10 2x10 2x10B 2x10B 2x10B   Seated sciatic glide 2x10B 2x10B    2x10 2x10 2x10B 2x10B 2x10B   Ther Activity                                       Gait Training                                       Modalities             Cervical traction                                           "

## 2025-05-12 ENCOUNTER — APPOINTMENT (EMERGENCY)
Dept: RADIOLOGY | Facility: HOSPITAL | Age: 55
End: 2025-05-12
Payer: OTHER MISCELLANEOUS

## 2025-05-12 ENCOUNTER — HOSPITAL ENCOUNTER (EMERGENCY)
Facility: HOSPITAL | Age: 55
Discharge: HOME | End: 2025-05-12
Attending: EMERGENCY MEDICINE
Payer: OTHER MISCELLANEOUS

## 2025-05-12 VITALS
DIASTOLIC BLOOD PRESSURE: 93 MMHG | RESPIRATION RATE: 18 BRPM | OXYGEN SATURATION: 97 % | BODY MASS INDEX: 34.09 KG/M2 | TEMPERATURE: 98.2 F | SYSTOLIC BLOOD PRESSURE: 156 MMHG | HEART RATE: 95 BPM | WEIGHT: 220.9 LBS

## 2025-05-12 DIAGNOSIS — S83.90XA KNEE SPRAIN: Primary | ICD-10-CM

## 2025-05-12 PROCEDURE — 73564 X-RAY EXAM KNEE 4 OR MORE: CPT

## 2025-05-12 PROCEDURE — 99283 EMERGENCY DEPT VISIT LOW MDM: CPT | Performed by: EMERGENCY MEDICINE

## 2025-05-12 PROCEDURE — 73552 X-RAY EXAM OF FEMUR 2/>: CPT

## 2025-05-12 PROCEDURE — 99284 EMERGENCY DEPT VISIT MOD MDM: CPT

## 2025-05-12 NOTE — Clinical Note
Verito Weathers was seen and treated in our emergency department on 5/12/2025.                Diagnosis:     Verito  may return to work on return date.    She may return on this date: 05/13/2025         If you have any questions or concerns, please don't hesitate to call.      Magdalene Brewer MD    ______________________________           _______________          _______________  Hospital Representative                              Date                                Time

## 2025-05-12 NOTE — ED PROVIDER NOTES
Emergency Department Employee Health Note  Verito Weathers 54 y.o. female MRN: [id number]  Unit/Bed#: ED 04/ED 04 Encounter: [id number]        History of Present Illness     Chief Complaint:   Chief Complaint   Patient presents with    Fall     Patient says she fell yesterday at work d/t tripping on a drawer that was open on a cart. Today c/o pain to right knee/hip and numbness to right sided low back; took 600 mg ibuprofen at 0500     HPI:  Verito Weathers is a 54 y.o. female who presents with right knee and hip pain from a fall yesterday while at work.  Patient notes that she tripped over a wooden piece at the end of her desk as she turned immediately when her patient rang the call bell.  She fell off the chair onto her right side.  She is having some posterior neck and upper back pain as well but she did not injure that area just likely muscle..  Mechanism:           HPI  Review of Systems   Constitutional:  Negative for chills and fever.   HENT:  Negative for ear pain and sore throat.    Eyes:  Negative for pain and visual disturbance.   Respiratory:  Negative for cough and shortness of breath.    Cardiovascular:  Negative for chest pain and palpitations.   Gastrointestinal:  Negative for abdominal pain and vomiting.   Genitourinary:  Negative for dysuria and hematuria.   Musculoskeletal:  Positive for arthralgias and back pain.   Skin:  Negative for color change and rash.   Neurological:  Negative for seizures and syncope.   All other systems reviewed and are negative.      Historical Information     Immunizations:   Immunization History   Administered Date(s) Administered    COVID-19 MODERNA VACC 0.5 ML IM 01/07/2021, 02/04/2021, 12/08/2021    COVID-19, unspecified 04/07/2022    INFLUENZA 09/15/2017, 08/29/2018, 08/28/2020, 10/10/2021, 10/17/2022, 10/06/2023    Pneumococcal Conjugate 13-Valent 03/22/2022    Tdap 01/20/2018    Tuberculin Skin Test-PPD Intradermal 05/11/2017, 05/13/2019       Past Medical History:    Diagnosis Date    Allergic     Headache(784.0) 02/15/2019    Obesity 2017       Family History   Problem Relation Age of Onset    Hypertension Mother     Heart disease Mother     Arthritis Mother         Due to old age    Vision loss Mother         Cant see from one eye, the other not great. Had laser to correct, and the one eye permanently damaged due to bad procedure    Heart attack Father     Alcohol abuse Father         Was an alcoholic -     Stroke Father          due to heart attack    Kidney disease Sister     Hypertension Sister     Hypertension Family     Heart disease Family     Diabetes type II Family     Dementia Maternal Aunt     Dementia Maternal Aunt     Stroke Brother          due to heart attack    Breast cancer Neg Hx      Past Surgical History:   Procedure Laterality Date    APPENDECTOMY      CERVICAL POLYPECTOMY      WISDOM TOOTH EXTRACTION       Social History     Tobacco Use    Smoking status: Former     Current packs/day: 0.00     Average packs/day: 0.3 packs/day for 10.0 years (2.5 ttl pk-yrs)     Types: Cigarettes     Quit date: 2001     Years since quittin.0    Smokeless tobacco: Never    Tobacco comments:     originally was a social smoker. Now, I'm allergic to cig smoke.   Vaping Use    Vaping status: Never Used   Substance Use Topics    Alcohol use: No    Drug use: No     E-Cigarette/Vaping    E-Cigarette Use Never User      E-Cigarette/Vaping Substances    Nicotine No     THC No     CBD No     Flavoring No     Other No     Unknown No          Meds/Allergies   Prior to Admission Medications   Prescriptions Last Dose Informant Patient Reported? Taking?   Coenzyme Q10 (COQ-10) 200 MG CAPS  Self Yes No   Sig: Take 1 capsule by mouth daily   Probiotic Product (PROBIOTIC PO)  Self Yes No   Sig: Take 1 capsule by mouth daily   Propylene Glycol (SYSTANE BALANCE OP)  Self Yes No   Sig: Apply 1 drop to eye as needed   cholecalciferol (VITAMIN D3) 1,000  units tablet  Self Yes No   Sig: Take 2,000 Units by mouth daily   tetrahydrozoline (VISINE) 0.05 % ophthalmic solution  Self Yes No   Sig: Administer 1 drop to both eyes 4 (four) times a day as needed for irritation      Facility-Administered Medications: None       Allergies   Allergen Reactions    Nicotine Cough, Headache, Sneezing and Nasal Congestion     smoke       PHYSICAL EXAM  Physical Exam  Vitals and nursing note reviewed.   Constitutional:       General: She is not in acute distress.     Appearance: She is well-developed.   HENT:      Head: Normocephalic and atraumatic.   Eyes:      Conjunctiva/sclera: Conjunctivae normal.   Pulmonary:      Effort: Pulmonary effort is normal. No respiratory distress.   Musculoskeletal:         General: No swelling.      Cervical back: Neck supple.      Comments: No midline L-spine tenderness to palpation.  Right knee with small amount of effusion with diffuse tenderness palpation.  Ambulating normally.   Skin:     General: Skin is warm and dry.   Neurological:      Mental Status: She is alert.   Psychiatric:         Mood and Affect: Mood normal.         Vital Signs  ED Triage Vitals [05/12/25 0814]   Temperature Pulse Respirations Blood Pressure SpO2   98.2 °F (36.8 °C) 95 18 156/93 97 %      Temp src Heart Rate Source Patient Position - Orthostatic VS BP Location FiO2 (%)   -- -- -- -- --      Pain Score       --           Vitals:    05/12/25 0814   BP: 156/93   Pulse: 95         Certification of Exposure:    (link to Employee Health Services: [facility] ([email or web address]): find link to BBP Exposure Instructions under important links on center of the page)    The patient is stable and has a history and physical exam consistent with an blunt trauma and based on my assessment this exposure is no exposure      Visual Acuity      ED Medications  Medications - No data to display    Diagnostic Studies  Results Reviewed       None               XR knee 4+ vw  right injury   ED Interpretation by Magdalene Brewer MD (05/12 0907)   No acute osseous abnormality      XR femur 2 views RIGHT   ED Interpretation by Magdalene Brewer MD (05/12 0907)   No acute osseous abnormality                 Procedures  Procedures         Medical Decision Making  54-year-old female with right knee and hip pain after a fall yesterday off of the chair.  X-ray of the right knee and hip without fracture or dislocation.  Likely knee sprain.  NSAIDs as needed for pain.  Otherwise rest recommended.    Amount and/or Complexity of Data Reviewed  Radiology: ordered and independent interpretation performed.        Disposition  Final diagnoses:   Knee sprain     Time reflects when diagnosis was documented in both MDM as applicable and the Disposition within this note       Time User Action Codes Description Comment    5/12/2025  9:07 AM Magdalene Brewer Add [S83.90XA] Knee sprain           ED Disposition       ED Disposition   Discharge    Condition   Stable    Date/Time   Mon May 12, 2025  9:07 AM    Comment   Verito Weathers discharge to home/self care.                   Follow-up Information       Follow up With Specialties Details Why Contact Info    Gabrielle Stock MD Family Medicine   [address] [place] [address]  [phone]              Discharge Medication List as of 5/12/2025  9:07 AM        CONTINUE these medications which have NOT CHANGED    Details   cholecalciferol (VITAMIN D3) 1,000 units tablet Take 2,000 Units by mouth daily, Historical Med      Coenzyme Q10 (COQ-10) 200 MG CAPS Take 1 capsule by mouth daily, Historical Med      Probiotic Product (PROBIOTIC PO) Take 1 capsule by mouth daily, Historical Med      Propylene Glycol (SYSTANE BALANCE OP) Apply 1 drop to eye as needed, Historical Med      tetrahydrozoline (VISINE) 0.05 % ophthalmic solution Administer 1 drop to both eyes 4 (four) times a day as needed for irritation, Historical Med             No  discharge procedures on file.    PDMP Review       None              ED Provider  Electronically Signed by               Magdalene Brewer MD  05/12/25 0954       Magdalene Brewer MD  05/12/25 0954

## 2025-05-15 ENCOUNTER — APPOINTMENT (OUTPATIENT)
Dept: URGENT CARE | Age: 55
End: 2025-05-15
Payer: OTHER MISCELLANEOUS

## 2025-05-15 PROCEDURE — G0382 LEV 3 HOSP TYPE B ED VISIT: HCPCS

## 2025-05-15 PROCEDURE — 99283 EMERGENCY DEPT VISIT LOW MDM: CPT

## 2025-05-21 ENCOUNTER — OCCMED (OUTPATIENT)
Dept: URGENT CARE | Facility: MEDICAL CENTER | Age: 55
End: 2025-05-21
Payer: OTHER MISCELLANEOUS

## 2025-05-21 DIAGNOSIS — M25.561 ACUTE PAIN OF RIGHT KNEE: Primary | ICD-10-CM

## 2025-05-21 PROCEDURE — 99213 OFFICE O/P EST LOW 20 MIN: CPT | Performed by: NURSE PRACTITIONER

## 2025-06-25 ENCOUNTER — TELEPHONE (OUTPATIENT)
Dept: OBGYN CLINIC | Facility: CLINIC | Age: 55
End: 2025-06-25

## 2025-07-25 ENCOUNTER — VBI (OUTPATIENT)
Dept: ADMINISTRATIVE | Facility: OTHER | Age: 55
End: 2025-07-25

## 2025-08-19 ENCOUNTER — TELEPHONE (OUTPATIENT)
Dept: FAMILY MEDICINE CLINIC | Facility: CLINIC | Age: 55
End: 2025-08-19

## 2025-08-27 PROBLEM — Z11.4 SCREENING FOR HIV (HUMAN IMMUNODEFICIENCY VIRUS): Status: RESOLVED | Noted: 2021-05-13 | Resolved: 2025-08-27

## 2025-08-27 PROBLEM — Z01.419 WOMEN'S ANNUAL ROUTINE GYNECOLOGICAL EXAMINATION: Status: ACTIVE | Noted: 2025-08-27
